# Patient Record
Sex: MALE | Race: WHITE | NOT HISPANIC OR LATINO | Employment: FULL TIME | URBAN - METROPOLITAN AREA
[De-identification: names, ages, dates, MRNs, and addresses within clinical notes are randomized per-mention and may not be internally consistent; named-entity substitution may affect disease eponyms.]

---

## 2017-02-13 ENCOUNTER — TELEPHONE (OUTPATIENT)
Dept: FAMILY MEDICINE | Facility: CLINIC | Age: 47
End: 2017-02-13

## 2017-02-13 DIAGNOSIS — Z79.4 TYPE 2 DIABETES MELLITUS WITHOUT COMPLICATION, WITH LONG-TERM CURRENT USE OF INSULIN: ICD-10-CM

## 2017-02-13 DIAGNOSIS — I10 ESSENTIAL HYPERTENSION: Chronic | ICD-10-CM

## 2017-02-13 DIAGNOSIS — E11.9 TYPE 2 DIABETES MELLITUS WITHOUT COMPLICATION, WITH LONG-TERM CURRENT USE OF INSULIN: ICD-10-CM

## 2017-02-13 RX ORDER — LISINOPRIL 40 MG/1
40 TABLET ORAL 2 TIMES DAILY
Qty: 180 TABLET | Refills: 3 | Status: SHIPPED | OUTPATIENT
Start: 2017-02-13 | End: 2017-02-13 | Stop reason: SDUPTHER

## 2017-02-13 RX ORDER — LISINOPRIL 40 MG/1
40 TABLET ORAL 2 TIMES DAILY
Qty: 60 TABLET | Refills: 11 | Status: SHIPPED | OUTPATIENT
Start: 2017-02-13 | End: 2018-02-11 | Stop reason: SDUPTHER

## 2017-02-13 NOTE — TELEPHONE ENCOUNTER
----- Message from Gina Jordan sent at 2/13/2017 12:42 PM CST -----  Needs refill of lisinopril called to [facility] [place] until mail order comes    [facility]  As well       Reach patient at  [phone]

## 2017-07-16 DIAGNOSIS — E11.9 TYPE 2 DIABETES MELLITUS WITHOUT COMPLICATION: Chronic | ICD-10-CM

## 2017-07-17 RX ORDER — PIOGLITAZONEHYDROCHLORIDE 30 MG/1
TABLET ORAL
Qty: 90 TABLET | Refills: 3 | Status: SHIPPED | OUTPATIENT
Start: 2017-07-17 | End: 2018-07-18

## 2017-08-04 RX ORDER — OMEPRAZOLE 40 MG/1
CAPSULE, DELAYED RELEASE ORAL
Qty: 90 CAPSULE | Refills: 3 | Status: SHIPPED | OUTPATIENT
Start: 2017-08-04 | End: 2018-07-18 | Stop reason: SDUPTHER

## 2017-08-07 DIAGNOSIS — E11.9 TYPE 2 DIABETES MELLITUS WITHOUT COMPLICATION: Chronic | ICD-10-CM

## 2017-08-07 RX ORDER — INSULIN LISPRO 100 [IU]/ML
30 INJECTION, SOLUTION INTRAVENOUS; SUBCUTANEOUS
Qty: 90 ML | Refills: 2 | Status: SHIPPED | OUTPATIENT
Start: 2017-08-07 | End: 2018-01-25 | Stop reason: SDUPTHER

## 2017-08-20 RX ORDER — PRAVASTATIN SODIUM 40 MG/1
TABLET ORAL
Qty: 90 TABLET | Refills: 2 | Status: SHIPPED | OUTPATIENT
Start: 2017-08-20 | End: 2018-05-17 | Stop reason: SDUPTHER

## 2017-09-19 ENCOUNTER — OFFICE VISIT (OUTPATIENT)
Dept: URGENT CARE | Facility: CLINIC | Age: 47
End: 2017-09-19
Payer: COMMERCIAL

## 2017-09-19 VITALS
HEART RATE: 94 BPM | HEIGHT: 76 IN | WEIGHT: 275 LBS | RESPIRATION RATE: 18 BRPM | OXYGEN SATURATION: 98 % | SYSTOLIC BLOOD PRESSURE: 113 MMHG | DIASTOLIC BLOOD PRESSURE: 66 MMHG | TEMPERATURE: 98 F | BODY MASS INDEX: 33.49 KG/M2

## 2017-09-19 DIAGNOSIS — H57.89 IRRITATION OF LEFT EYE: Primary | ICD-10-CM

## 2017-09-19 PROCEDURE — 3074F SYST BP LT 130 MM HG: CPT | Mod: S$GLB,,, | Performed by: PHYSICIAN ASSISTANT

## 2017-09-19 PROCEDURE — 3078F DIAST BP <80 MM HG: CPT | Mod: S$GLB,,, | Performed by: PHYSICIAN ASSISTANT

## 2017-09-19 PROCEDURE — 3008F BODY MASS INDEX DOCD: CPT | Mod: S$GLB,,, | Performed by: PHYSICIAN ASSISTANT

## 2017-09-19 PROCEDURE — 99203 OFFICE O/P NEW LOW 30 MIN: CPT | Mod: S$GLB,,, | Performed by: PHYSICIAN ASSISTANT

## 2017-09-19 RX ORDER — TOBRAMYCIN AND DEXAMETHASONE 3; 1 MG/ML; MG/ML
1 SUSPENSION/ DROPS OPHTHALMIC EVERY 6 HOURS
Qty: 1 BOTTLE | Refills: 0 | Status: SHIPPED | OUTPATIENT
Start: 2017-09-19 | End: 2017-09-24

## 2017-09-19 RX ORDER — FENOFIBRATE 145 MG/1
TABLET, FILM COATED ORAL
Qty: 90 TABLET | Refills: 3 | Status: SHIPPED | OUTPATIENT
Start: 2017-09-19 | End: 2018-07-18 | Stop reason: SDUPTHER

## 2017-09-19 NOTE — PROGRESS NOTES
"Subjective:       Patient ID: Lopez Hicks is a 47 y.o. male.    Vitals:  height is 6' 4" (1.93 m) and weight is 124.7 kg (275 lb). His oral temperature is 98 °F (36.7 °C). His blood pressure is 113/66 and his pulse is 94. His respiration is 18 and oxygen saturation is 98%.     Chief Complaint: Eye Problem    Eye Problem    The left eye is affected. This is a new problem. The current episode started in the past 7 days. The problem occurs constantly. The problem has been waxing and waning. There was no injury mechanism. The pain is at a severity of 7/10. The pain is moderate. There is no known exposure to pink eye. He does not wear contacts. Associated symptoms include itching. Pertinent negatives include no blurred vision, eye redness, fever, nausea, photophobia or vomiting. He has tried eye drops for the symptoms. The treatment provided moderate relief.     Review of Systems   Constitution: Negative for chills and fever.   HENT: Negative for congestion.    Eyes: Positive for itching and pain. Negative for blurred vision, photophobia and redness.   Gastrointestinal: Negative for nausea and vomiting.   Neurological: Negative for headaches.   All other systems reviewed and are negative.      Objective:      Physical Exam   Constitutional: He is oriented to person, place, and time. He appears well-developed and well-nourished.   HENT:   Head: Normocephalic and atraumatic.   Right Ear: External ear normal.   Left Ear: External ear normal.   Nose: Nose normal.   Mouth/Throat: Oropharynx is clear and moist.   Eyes: Conjunctivae and EOM are normal. Pupils are equal, round, and reactive to light. Right eye exhibits no chemosis, no discharge, no exudate and no hordeolum. No foreign body present in the right eye. Left eye exhibits no chemosis, no discharge, no exudate and no hordeolum. Foreign body present in the left eye. Right conjunctiva is not injected. Right conjunctiva has no hemorrhage. Left conjunctiva is not " injected. Left conjunctiva has no hemorrhage. No scleral icterus.   Dust/discharge irrigated from L eye.  No abrasion noted under fluorescein.   No residual FB   Pt tolerated well   Neck: Trachea normal, full passive range of motion without pain and phonation normal. Neck supple.   Musculoskeletal: Normal range of motion.   Neurological: He is alert and oriented to person, place, and time.   Skin: Skin is warm, dry and intact.   Psychiatric: He has a normal mood and affect. His speech is normal and behavior is normal. Judgment and thought content normal. Cognition and memory are normal.   Nursing note and vitals reviewed.      Assessment:       1. Irritation of left eye        Plan:         Irritation of left eye  -     tobramycin-dexamethasone 0.3-0.1% (TOBRADEX) 0.3-0.1 % DrpS; Place 1 drop into the left eye every 6 (six) hours.  Dispense: 1 Bottle; Refill: 0        Particle in the Eye (Conjunctival Foreign Body, Resolved)  The conjunctiva is a thin membrane in the eye. It covers the white of the eye and the inside of the eyelid. A very small object, such as an eyelash or dirt, can become trapped under the eyelid. This is called a conjunctival foreign body. This can be very irritating to the eye, no matter how small the object is. If the exam shows that you no longer have a particle in your eye, any discomfort should go away within the next 24 hours.  Home care  ·   Apply a cool compress (a towel soaked in cool water) to the eye that hurts. Do this 3 to 4 times a day. It will help to reduce redness and swelling.  · Artificial tears can be used to reduce irritation and redness, unless another medicine was prescribed. Artificial tears can be purchased at [employer]  · Acetaminophen or ibuprofen can be used to control pain, unless another pain medicine was prescribed. (Note: If you have chronic liver or kidney disease, or if you have ever had a stomach ulcer or gastrointestinal bleeding, talk with your doctor before  using these medicines.)  Follow-up care  Follow up with your healthcare provider, or as advised.  When to seek medical advice  Contact your healthcare provider right away if any of these occur:  · Increased swelling of the eyelid  · Increased pain or redness in the eye  · Drainage from the eye  · Redness in the skin around the eye  Date Last Reviewed: 6/14/2015  © 4701-0212 [employer]. [address] [place] [address]. All rights reserved. This information is not intended as a substitute for professional medical care. Always follow your healthcare professional's instructions.      Please follow up with your Primary care provider within 2-5 days if your signs and symptoms have not resolved or worsen.     If your condition worsens or fails to improve we recommend that you receive another evaluation at the emergency room immediately or contact your primary medical clinic to discuss your concerns.   You must understand that you have received an Urgent Care treatment only and that you may be released before all of your medical problems are known or treated. You, the patient, will arrange for follow up care as instructed.

## 2017-09-19 NOTE — PATIENT INSTRUCTIONS
Particle in the Eye (Conjunctival Foreign Body, Resolved)  The conjunctiva is a thin membrane in the eye. It covers the white of the eye and the inside of the eyelid. A very small object, such as an eyelash or dirt, can become trapped under the eyelid. This is called a conjunctival foreign body. This can be very irritating to the eye, no matter how small the object is. If the exam shows that you no longer have a particle in your eye, any discomfort should go away within the next 24 hours.  Home care  ·   Apply a cool compress (a towel soaked in cool water) to the eye that hurts. Do this 3 to 4 times a day. It will help to reduce redness and swelling.  · Artificial tears can be used to reduce irritation and redness, unless another medicine was prescribed. Artificial tears can be purchased at [email or web address]  · Acetaminophen or ibuprofen can be used to control pain, unless another pain medicine was prescribed. (Note: If you have chronic liver or kidney disease, or if you have ever had a stomach ulcer or gastrointestinal bleeding, talk with your doctor before using these medicines.)  Follow-up care  Follow up with your healthcare provider, or as advised.  When to seek medical advice  Contact your healthcare provider right away if any of these occur:  · Increased swelling of the eyelid  · Increased pain or redness in the eye  · Drainage from the eye  · Redness in the skin around the eye  Date Last Reviewed: 6/14/2015  © 7357-7331 [employer]. [address] [place] [address], [place] [address]. All rights reserved. This information is not intended as a substitute for professional medical care. Always follow your healthcare professional's instructions.      Please follow up with your Primary care provider within 2-5 days if your signs and symptoms have not resolved or worsen.     If your condition worsens or fails to improve we recommend that you receive another evaluation at the emergency room immediately or contact  your primary medical clinic to discuss your concerns.   You must understand that you have received an Urgent Care treatment only and that you may be released before all of your medical problems are known or treated. You, the patient, will arrange for follow up care as instructed.

## 2017-09-21 ENCOUNTER — TELEPHONE (OUTPATIENT)
Dept: URGENT CARE | Facility: CLINIC | Age: 47
End: 2017-09-21

## 2017-10-18 RX ORDER — BUPROPION HYDROCHLORIDE 200 MG/1
TABLET, EXTENDED RELEASE ORAL
Qty: 180 TABLET | Refills: 3 | Status: SHIPPED | OUTPATIENT
Start: 2017-10-18 | End: 2018-07-18 | Stop reason: SDUPTHER

## 2017-11-12 ENCOUNTER — HOSPITAL ENCOUNTER (OUTPATIENT)
Dept: SLEEP MEDICINE | Facility: HOSPITAL | Age: 47
Discharge: HOME | End: 2017-11-12
Attending: OTOLARYNGOLOGY
Payer: COMMERCIAL

## 2017-11-12 DIAGNOSIS — G47.30 INSOMNIA WITH SLEEP APNEA: ICD-10-CM

## 2017-11-12 DIAGNOSIS — G47.00 INSOMNIA WITH SLEEP APNEA: ICD-10-CM

## 2017-11-12 PROCEDURE — 95806 SLEEP STUDY UNATT&RESP EFFT: CPT

## 2017-12-19 ENCOUNTER — PATIENT OUTREACH (OUTPATIENT)
Dept: ADMINISTRATIVE | Facility: HOSPITAL | Age: 47
End: 2017-12-19

## 2017-12-19 NOTE — PATIENT INSTRUCTIONS
Phone pt to confirm PCP, and schedule appointment for a check up. 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

## 2018-01-25 ENCOUNTER — OFFICE VISIT (OUTPATIENT)
Dept: FAMILY MEDICINE | Facility: CLINIC | Age: 48
End: 2018-01-25
Payer: COMMERCIAL

## 2018-01-25 VITALS
WEIGHT: 288.56 LBS | HEART RATE: 93 BPM | SYSTOLIC BLOOD PRESSURE: 120 MMHG | HEIGHT: 76 IN | BODY MASS INDEX: 35.14 KG/M2 | OXYGEN SATURATION: 97 % | DIASTOLIC BLOOD PRESSURE: 80 MMHG

## 2018-01-25 DIAGNOSIS — Z29.9 PREVENTIVE MEASURE: ICD-10-CM

## 2018-01-25 DIAGNOSIS — E78.00 PURE HYPERCHOLESTEROLEMIA: Chronic | ICD-10-CM

## 2018-01-25 DIAGNOSIS — M62.81 MUSCLE WEAKNESS: ICD-10-CM

## 2018-01-25 DIAGNOSIS — Z00.00 ENCOUNTER FOR PREVENTIVE HEALTH EXAMINATION: ICD-10-CM

## 2018-01-25 DIAGNOSIS — Z79.4 TYPE 2 DIABETES MELLITUS WITHOUT COMPLICATION, WITH LONG-TERM CURRENT USE OF INSULIN: Primary | Chronic | ICD-10-CM

## 2018-01-25 DIAGNOSIS — E11.9 TYPE 2 DIABETES MELLITUS WITHOUT COMPLICATION, WITH LONG-TERM CURRENT USE OF INSULIN: Primary | Chronic | ICD-10-CM

## 2018-01-25 DIAGNOSIS — N52.9 ERECTILE DYSFUNCTION, UNSPECIFIED ERECTILE DYSFUNCTION TYPE: ICD-10-CM

## 2018-01-25 PROCEDURE — 90472 IMMUNIZATION ADMIN EACH ADD: CPT | Mod: S$GLB,,,

## 2018-01-25 PROCEDURE — 99396 PREV VISIT EST AGE 40-64: CPT | Mod: 25,S$GLB,,

## 2018-01-25 PROCEDURE — 90715 TDAP VACCINE 7 YRS/> IM: CPT | Mod: S$GLB,,,

## 2018-01-25 PROCEDURE — 90688 IIV4 VACCINE SPLT 0.5 ML IM: CPT | Mod: S$GLB,,,

## 2018-01-25 PROCEDURE — 90471 IMMUNIZATION ADMIN: CPT | Mod: S$GLB,,,

## 2018-01-25 PROCEDURE — 99999 PR PBB SHADOW E&M-EST. PATIENT-LVL IV: CPT | Mod: PBBFAC,,,

## 2018-01-25 RX ORDER — INSULIN LISPRO 100 [IU]/ML
50 INJECTION, SOLUTION INTRAVENOUS; SUBCUTANEOUS
Qty: 90 ML | Refills: 2 | Status: SHIPPED | OUTPATIENT
Start: 2018-01-25 | End: 2018-07-18 | Stop reason: SDUPTHER

## 2018-01-25 RX ORDER — INSULIN GLARGINE 100 [IU]/ML
INJECTION, SOLUTION SUBCUTANEOUS
Qty: 90 ML | Refills: 3 | Status: SHIPPED | OUTPATIENT
Start: 2018-01-25 | End: 2018-07-18 | Stop reason: SDUPTHER

## 2018-01-25 RX ORDER — SILDENAFIL 100 MG/1
100 TABLET, FILM COATED ORAL DAILY PRN
Qty: 18 TABLET | Refills: 3 | Status: SHIPPED | OUTPATIENT
Start: 2018-01-25 | End: 2018-07-18

## 2018-01-27 ENCOUNTER — TELEPHONE (OUTPATIENT)
Dept: FAMILY MEDICINE | Facility: CLINIC | Age: 48
End: 2018-01-27

## 2018-02-11 DIAGNOSIS — I10 ESSENTIAL HYPERTENSION: Chronic | ICD-10-CM

## 2018-02-11 DIAGNOSIS — E11.9 TYPE 2 DIABETES MELLITUS WITHOUT COMPLICATION, WITH LONG-TERM CURRENT USE OF INSULIN: ICD-10-CM

## 2018-02-11 DIAGNOSIS — Z79.4 TYPE 2 DIABETES MELLITUS WITHOUT COMPLICATION, WITH LONG-TERM CURRENT USE OF INSULIN: ICD-10-CM

## 2018-02-12 RX ORDER — LISINOPRIL 40 MG/1
TABLET ORAL
Qty: 60 TABLET | Refills: 10 | Status: SHIPPED | OUTPATIENT
Start: 2018-02-12 | End: 2018-07-18 | Stop reason: SDUPTHER

## 2018-04-30 PROBLEM — Z00.00 ENCOUNTER FOR PREVENTIVE HEALTH EXAMINATION: Status: RESOLVED | Noted: 2018-01-25 | Resolved: 2018-04-30

## 2018-05-17 RX ORDER — PRAVASTATIN SODIUM 40 MG/1
40 TABLET ORAL DAILY
Qty: 90 TABLET | Refills: 0 | Status: SHIPPED | OUTPATIENT
Start: 2018-05-17 | End: 2018-07-18 | Stop reason: SDUPTHER

## 2018-07-18 ENCOUNTER — OFFICE VISIT (OUTPATIENT)
Dept: INTERNAL MEDICINE | Facility: CLINIC | Age: 48
End: 2018-07-18
Payer: COMMERCIAL

## 2018-07-18 VITALS
RESPIRATION RATE: 16 BRPM | TEMPERATURE: 98 F | SYSTOLIC BLOOD PRESSURE: 134 MMHG | HEART RATE: 98 BPM | WEIGHT: 280 LBS | BODY MASS INDEX: 34.1 KG/M2 | DIASTOLIC BLOOD PRESSURE: 84 MMHG | HEIGHT: 76 IN

## 2018-07-18 DIAGNOSIS — E66.9 OBESITY (BMI 30.0-34.9): ICD-10-CM

## 2018-07-18 DIAGNOSIS — E78.5 HYPERLIPIDEMIA, UNSPECIFIED HYPERLIPIDEMIA TYPE: ICD-10-CM

## 2018-07-18 DIAGNOSIS — Z00.00 ROUTINE GENERAL MEDICAL EXAMINATION AT A HEALTH CARE FACILITY: Primary | ICD-10-CM

## 2018-07-18 DIAGNOSIS — Z12.5 PROSTATE CANCER SCREENING: ICD-10-CM

## 2018-07-18 DIAGNOSIS — E11.9 TYPE 2 DIABETES MELLITUS WITHOUT COMPLICATION, WITH LONG-TERM CURRENT USE OF INSULIN: ICD-10-CM

## 2018-07-18 DIAGNOSIS — Z12.11 COLON CANCER SCREENING: ICD-10-CM

## 2018-07-18 DIAGNOSIS — I10 ESSENTIAL HYPERTENSION: Chronic | ICD-10-CM

## 2018-07-18 DIAGNOSIS — Z79.4 TYPE 2 DIABETES MELLITUS WITHOUT COMPLICATION, WITH LONG-TERM CURRENT USE OF INSULIN: ICD-10-CM

## 2018-07-18 DIAGNOSIS — E78.1 HYPERTRIGLYCERIDEMIA: ICD-10-CM

## 2018-07-18 PROCEDURE — 3075F SYST BP GE 130 - 139MM HG: CPT | Mod: CPTII,S$GLB,, | Performed by: FAMILY MEDICINE

## 2018-07-18 PROCEDURE — 3045F PR MOST RECENT HEMOGLOBIN A1C LEVEL 7.0-9.0%: CPT | Mod: CPTII,S$GLB,, | Performed by: FAMILY MEDICINE

## 2018-07-18 PROCEDURE — 99396 PREV VISIT EST AGE 40-64: CPT | Mod: S$GLB,,, | Performed by: FAMILY MEDICINE

## 2018-07-18 PROCEDURE — 3079F DIAST BP 80-89 MM HG: CPT | Mod: CPTII,S$GLB,, | Performed by: FAMILY MEDICINE

## 2018-07-18 PROCEDURE — 99999 PR PBB SHADOW E&M-EST. PATIENT-LVL III: CPT | Mod: PBBFAC,,, | Performed by: FAMILY MEDICINE

## 2018-07-18 RX ORDER — OMEPRAZOLE 40 MG/1
40 CAPSULE, DELAYED RELEASE ORAL DAILY
Qty: 90 CAPSULE | Refills: 1 | Status: SHIPPED | OUTPATIENT
Start: 2018-07-18 | End: 2019-01-14 | Stop reason: SDUPTHER

## 2018-07-18 RX ORDER — PRAVASTATIN SODIUM 40 MG/1
40 TABLET ORAL DAILY
Qty: 90 TABLET | Refills: 1 | Status: SHIPPED | OUTPATIENT
Start: 2018-07-18 | End: 2019-01-21 | Stop reason: SDUPTHER

## 2018-07-18 RX ORDER — ASPIRIN 81 MG/1
81 TABLET ORAL DAILY
COMMUNITY

## 2018-07-18 RX ORDER — INSULIN LISPRO 100 [IU]/ML
50 INJECTION, SOLUTION INTRAVENOUS; SUBCUTANEOUS
Qty: 90 ML | Refills: 1 | Status: SHIPPED | OUTPATIENT
Start: 2018-07-18 | End: 2018-08-14

## 2018-07-18 RX ORDER — BUPROPION HYDROCHLORIDE 200 MG/1
200 TABLET, EXTENDED RELEASE ORAL 2 TIMES DAILY
Qty: 180 TABLET | Refills: 1 | Status: SHIPPED | OUTPATIENT
Start: 2018-07-18 | End: 2019-05-08 | Stop reason: SDUPTHER

## 2018-07-18 RX ORDER — INSULIN GLARGINE 100 [IU]/ML
INJECTION, SOLUTION SUBCUTANEOUS
Qty: 90 ML | Refills: 1 | Status: SHIPPED | OUTPATIENT
Start: 2018-07-18 | End: 2018-08-14

## 2018-07-18 RX ORDER — FENOFIBRATE 145 MG/1
145 TABLET, FILM COATED ORAL DAILY
Qty: 90 TABLET | Refills: 1 | Status: SHIPPED | OUTPATIENT
Start: 2018-07-18 | End: 2019-01-14 | Stop reason: SDUPTHER

## 2018-07-18 RX ORDER — LISINOPRIL 40 MG/1
40 TABLET ORAL 2 TIMES DAILY
Qty: 180 TABLET | Refills: 1 | Status: SHIPPED | OUTPATIENT
Start: 2018-07-18 | End: 2019-01-14 | Stop reason: SDUPTHER

## 2018-07-19 ENCOUNTER — TELEPHONE (OUTPATIENT)
Dept: INTERNAL MEDICINE | Facility: CLINIC | Age: 48
End: 2018-07-19

## 2018-07-19 PROBLEM — E66.811 OBESITY (BMI 30.0-34.9): Status: ACTIVE | Noted: 2018-07-19

## 2018-07-19 PROBLEM — E66.9 OBESITY (BMI 30.0-34.9): Status: ACTIVE | Noted: 2018-07-19

## 2018-07-19 PROBLEM — E78.5 HYPERLIPIDEMIA: Status: ACTIVE | Noted: 2018-07-19

## 2018-07-19 NOTE — TELEPHONE ENCOUNTER
"----- Message from Jacqui Teddy sent at 7/19/2018  1:20 PM CDT -----  Contact: t[facility] [phone]  RX request - refill or new RX.  Is this a refill or new RX:  Refill   RX name and strength: Pioglitazone  30 mg  Directions:   Is this a 30 day or 90 day RX:  90 ay   Local pharmacy or mail order pharmacy:    Pharmacy name and phone # (DON'T enter "on file" or "in chart"): [facility]      "

## 2018-07-19 NOTE — PROGRESS NOTES
"Subjective:   Patient ID: Lopez Hicks is a 48 y.o. male.    Chief Complaint: Establish Care      HPI  47 yo male with type 2 diabetes with poor control with SEVERAL medications here for annual exam      Patient queried and denies any further complaints.    PREVENTIVE MED  Diet  Exercise  Colorectal Ca  Alcohol use  Tobacco  BP  Depression  Type 2 DM  Hep C  STD  Vision  ALL REVIEWED    ALLERGIES AND MEDICATIONS: updated and reviewed.  Review of patient's allergies indicates:  No Known Allergies    Current Outpatient Prescriptions:     aspirin (ECOTRIN) 81 MG EC tablet, Take 81 mg by mouth once daily., Disp: , Rfl:     b complex vitamins tablet, Take 1 tablet by mouth once daily., Disp: , Rfl:     BD ULTRA-FINE TENA PEN NEEDLES 32 gauge x 5/32" Ndle, , Disp: , Rfl:     buPROPion (WELLBUTRIN SR) 200 MG Tb12, Take 1 tablet (200 mg total) by mouth 2 (two) times daily., Disp: 180 tablet, Rfl: 1    BYDUREON 2 mg/0.65 mL PnIj, INJECT 2 MG INTO THE SKIN ONCE A WEEK, Disp: 12 each, Rfl: 3    fenofibrate (TRICOR) 145 MG tablet, Take 1 tablet (145 mg total) by mouth once daily., Disp: 90 tablet, Rfl: 1    insulin glargine (LANTUS SOLOSTAR U-100 INSULIN) 100 unit/mL (3 mL) InPn pen, INJECT 100 UNITS INTO THE SKIN EVERY EVENING, Disp: 90 mL, Rfl: 1    insulin glargine (LANTUS) 100 unit/mL injection, Inject 100 Units into the skin every evening., Disp: 30 mL, Rfl: 3    insulin lispro (HUMALOG KWIKPEN INSULIN) 100 unit/mL InPn pen, Inject 50 Units into the skin daily with dinner or evening meal., Disp: 90 mL, Rfl: 1    lisinopril (PRINIVIL,ZESTRIL) 40 MG tablet, Take 1 tablet (40 mg total) by mouth 2 (two) times daily., Disp: 180 tablet, Rfl: 1    multivitamin (ONE DAILY MULTIVITAMIN) per tablet, Take 1 tablet by mouth once daily., Disp: , Rfl:     omeprazole (PRILOSEC) 40 MG capsule, Take 1 capsule (40 mg total) by mouth once daily., Disp: 90 capsule, Rfl: 1    ONETOUCH DELICA LANCETS 33 gauge Misc, , Disp: " , Rfl:     ONETOUCH ULTRA TEST Strp, , Disp: , Rfl:     pravastatin (PRAVACHOL) 40 MG tablet, Take 1 tablet (40 mg total) by mouth once daily., Disp: 90 tablet, Rfl: 1    sildenafil (VIAGRA) 100 MG tablet, Take 1 tablet (100 mg total) by mouth daily as needed for Erectile Dysfunction., Disp: 18 tablet, Rfl: 3    SITagliptan-metformin (JANUMET) 50-1,000 mg per tablet, Take 1 tablet by mouth 2 (two) times daily with meals., Disp: 180 tablet, Rfl: 1    Review of Systems   Constitutional: Negative for activity change, appetite change, chills, diaphoresis, fatigue, fever and unexpected weight change.   HENT: Negative for congestion, ear discharge, ear pain, facial swelling, hearing loss, nosebleeds, postnasal drip, rhinorrhea, sinus pressure, sneezing, sore throat, tinnitus, trouble swallowing and voice change.    Eyes: Negative for photophobia, pain, discharge, redness, itching and visual disturbance.   Respiratory: Negative for cough, chest tightness, shortness of breath and wheezing.    Cardiovascular: Negative for chest pain, palpitations and leg swelling.   Gastrointestinal: Negative for abdominal distention, abdominal pain, anal bleeding, blood in stool, constipation, diarrhea, nausea, rectal pain and vomiting.   Endocrine: Negative for cold intolerance, heat intolerance, polydipsia, polyphagia and polyuria.   Genitourinary: Negative for difficulty urinating, dysuria and flank pain.   Musculoskeletal: Negative for arthralgias, back pain, joint swelling, myalgias and neck pain.   Skin: Negative for rash.   Neurological: Negative for dizziness, tremors, seizures, syncope, speech difficulty, weakness, light-headedness, numbness and headaches.   Psychiatric/Behavioral: Negative for behavioral problems, confusion, decreased concentration, dysphoric mood, sleep disturbance and suicidal ideas. The patient is not nervous/anxious and is not hyperactive.        Objective:     Vitals:    07/18/18 1252   BP: 134/84  "  Pulse: 98   Resp: 16   Temp: 98 °F (36.7 °C)   TempSrc: Oral   Weight: 127 kg (280 lb)   Height: 6' 4" (1.93 m)   PainSc: 0-No pain     Body mass index is 34.08 kg/m².    Physical Exam   Constitutional: He is oriented to person, place, and time. He appears well-developed and well-nourished. He is cooperative. He does not have a sickly appearance. No distress.   HENT:   Head: Normocephalic and atraumatic.   Right Ear: Hearing, tympanic membrane, external ear and ear canal normal. No tenderness.   Left Ear: Hearing, tympanic membrane, external ear and ear canal normal. No tenderness.   Nose: Nose normal.   Mouth/Throat: Oropharynx is clear and moist.   Eyes: Conjunctivae and lids are normal. Pupils are equal, round, and reactive to light. Right eye exhibits no discharge. Left eye exhibits no discharge. Right conjunctiva is not injected. Left conjunctiva is not injected. No scleral icterus. Right eye exhibits normal extraocular motion. Left eye exhibits normal extraocular motion.   Neck: Normal range of motion. Neck supple. No JVD present. Carotid bruit is not present. No tracheal deviation and no edema present. No thyromegaly present.   Cardiovascular: Normal rate, regular rhythm, normal heart sounds and normal pulses.  Exam reveals no friction rub.    No murmur heard.  Pulmonary/Chest: Effort normal and breath sounds normal. No accessory muscle usage. No respiratory distress. He has no wheezes. He has no rhonchi. He has no rales.   Abdominal: Soft. Bowel sounds are normal. He exhibits no distension, no abdominal bruit, no pulsatile midline mass and no mass. There is no hepatosplenomegaly. There is no tenderness. There is no rebound, no guarding, no CVA tenderness, no tenderness at McBurney's point and negative Baez's sign.   Musculoskeletal: He exhibits no edema.   Lymphadenopathy:        Head (right side): No submandibular, no preauricular and no posterior auricular adenopathy present.        Head (left side): " No submandibular, no preauricular and no posterior auricular adenopathy present.     He has no cervical adenopathy.   Neurological: He is alert and oriented to person, place, and time. GCS eye subscore is 4. GCS verbal subscore is 5. GCS motor subscore is 6.   Skin: Skin is warm and dry. No ecchymosis and no rash noted. Rash is not maculopapular and not urticarial. He is not diaphoretic. No cyanosis or erythema. Nails show no clubbing.   Psychiatric: He has a normal mood and affect. His speech is normal and behavior is normal. Thought content normal. His mood appears not anxious. His affect is not angry and not inappropriate. He does not exhibit a depressed mood.   Nursing note and vitals reviewed.      Assessment and Plan:   Lopez was seen today for establish care.    Diagnoses and all orders for this visit:    Routine general medical examination at a health care facility  -     CBC auto differential; Future  -     Comprehensive metabolic panel; Future  -     Lipid panel; Future  -     TSH; Future  -     T4, free; Future  -     PSA, Screening; Future    Type 2 diabetes mellitus without complication, with long-term current use of insulin  -     lisinopril (PRINIVIL,ZESTRIL) 40 MG tablet; Take 1 tablet (40 mg total) by mouth 2 (two) times daily.  -     SITagliptan-metformin (JANUMET) 50-1,000 mg per tablet; Take 1 tablet by mouth 2 (two) times daily with meals.  -     insulin lispro (HUMALOG KWIKPEN INSULIN) 100 unit/mL InPn pen; Inject 50 Units into the skin daily with dinner or evening meal.  -     Comprehensive metabolic panel; Future  -     Hemoglobin A1c; Future  -     Lipid panel; Future  -     TSH; Future  -     T4, free; Future  -     Microalbumin/creatinine urine ratio; Future  -     Ambulatory consult to Endocrinology    Essential hypertension  -     lisinopril (PRINIVIL,ZESTRIL) 40 MG tablet; Take 1 tablet (40 mg total) by mouth 2 (two) times daily.  -     Comprehensive metabolic panel;  Future    Hyperlipidemia, unspecified hyperlipidemia type    Hypertriglyceridemia    Prostate cancer screening  -     PSA, Screening; Future    Colon cancer screening  -     Case request GI: COLONOSCOPY    Obesity (BMI 30.0-34.9)    Other orders  -     pravastatin (PRAVACHOL) 40 MG tablet; Take 1 tablet (40 mg total) by mouth once daily.  -     omeprazole (PRILOSEC) 40 MG capsule; Take 1 capsule (40 mg total) by mouth once daily.  -     insulin glargine (LANTUS SOLOSTAR U-100 INSULIN) 100 unit/mL (3 mL) InPn pen; INJECT 100 UNITS INTO THE SKIN EVERY EVENING  -     fenofibrate (TRICOR) 145 MG tablet; Take 1 tablet (145 mg total) by mouth once daily.  -     buPROPion (WELLBUTRIN SR) 200 MG Tb12; Take 1 tablet (200 mg total) by mouth 2 (two) times daily.        Follow-up in about 3 months (around 10/18/2018).    THIS NOTE WILL BE SHARED WITH THE PATIENT.

## 2018-07-25 ENCOUNTER — TELEPHONE (OUTPATIENT)
Dept: ENDOCRINOLOGY | Facility: CLINIC | Age: 48
End: 2018-07-25

## 2018-07-25 NOTE — TELEPHONE ENCOUNTER
Appointment scheduled with Brina Randle for Aug 14th for 9:00am.  Patient will call back if he needs to reschedule but thinks this date will be ok

## 2018-07-25 NOTE — TELEPHONE ENCOUNTER
----- Message from Franklin Sánchez MA sent at 7/25/2018  8:14 AM CDT -----  Contact: [phone]  Yes I will call today to schedule a visit.  Thank you.  ----- Message -----  From: Key Arellano  Sent: 7/25/2018   8:06 AM  To: [facility]ia [facility]ILL[facility]ER[facility] Staff    Good morning, pt is being referred to Endo for DM type 2. Dr Lovett is requesting Dr Randle. I attempted to schedule but did not see any available. Can you please call pt to discuss appt?      Thanks!

## 2018-08-13 ENCOUNTER — TELEPHONE (OUTPATIENT)
Dept: ENDOSCOPY | Facility: HOSPITAL | Age: 48
End: 2018-08-13

## 2018-08-13 NOTE — TELEPHONE ENCOUNTER
Contacted Pt to schedule colonoscopy, no answer, left message for Pt to call back to schedule, number provided [phone].

## 2018-08-14 ENCOUNTER — OFFICE VISIT (OUTPATIENT)
Dept: ENDOCRINOLOGY | Facility: CLINIC | Age: 48
End: 2018-08-14
Payer: COMMERCIAL

## 2018-08-14 VITALS
DIASTOLIC BLOOD PRESSURE: 86 MMHG | WEIGHT: 291.88 LBS | BODY MASS INDEX: 35.54 KG/M2 | HEIGHT: 76 IN | HEART RATE: 86 BPM | SYSTOLIC BLOOD PRESSURE: 138 MMHG

## 2018-08-14 DIAGNOSIS — I10 ESSENTIAL HYPERTENSION: Chronic | ICD-10-CM

## 2018-08-14 DIAGNOSIS — E78.5 HYPERLIPIDEMIA, UNSPECIFIED HYPERLIPIDEMIA TYPE: ICD-10-CM

## 2018-08-14 DIAGNOSIS — Z79.4 TYPE 2 DIABETES MELLITUS WITH DIABETIC POLYNEUROPATHY, WITH LONG-TERM CURRENT USE OF INSULIN: Primary | ICD-10-CM

## 2018-08-14 DIAGNOSIS — E11.42 TYPE 2 DIABETES MELLITUS WITH DIABETIC POLYNEUROPATHY, WITH LONG-TERM CURRENT USE OF INSULIN: Primary | ICD-10-CM

## 2018-08-14 LAB — GLUCOSE SERPL-MCNC: 206 MG/DL (ref 70–110)

## 2018-08-14 PROCEDURE — 99204 OFFICE O/P NEW MOD 45 MIN: CPT | Mod: S$GLB,,, | Performed by: INTERNAL MEDICINE

## 2018-08-14 PROCEDURE — 99999 PR PBB SHADOW E&M-EST. PATIENT-LVL IV: CPT | Mod: PBBFAC,,, | Performed by: INTERNAL MEDICINE

## 2018-08-14 PROCEDURE — 82948 REAGENT STRIP/BLOOD GLUCOSE: CPT | Mod: S$GLB,,, | Performed by: INTERNAL MEDICINE

## 2018-08-14 PROCEDURE — 3008F BODY MASS INDEX DOCD: CPT | Mod: CPTII,S$GLB,, | Performed by: INTERNAL MEDICINE

## 2018-08-14 PROCEDURE — 3045F PR MOST RECENT HEMOGLOBIN A1C LEVEL 7.0-9.0%: CPT | Mod: CPTII,S$GLB,, | Performed by: INTERNAL MEDICINE

## 2018-08-14 PROCEDURE — 3079F DIAST BP 80-89 MM HG: CPT | Mod: CPTII,S$GLB,, | Performed by: INTERNAL MEDICINE

## 2018-08-14 PROCEDURE — 3075F SYST BP GE 130 - 139MM HG: CPT | Mod: CPTII,S$GLB,, | Performed by: INTERNAL MEDICINE

## 2018-08-14 RX ORDER — INSULIN LISPRO 100 [IU]/ML
INJECTION, SOLUTION INTRAVENOUS; SUBCUTANEOUS
Qty: 90 ML | Refills: 3 | Status: SHIPPED | OUTPATIENT
Start: 2018-08-14 | End: 2018-08-14

## 2018-08-14 RX ORDER — INSULIN LISPRO 100 [IU]/ML
INJECTION, SOLUTION INTRAVENOUS; SUBCUTANEOUS
Qty: 90 ML | Refills: 3 | Status: SHIPPED | OUTPATIENT
Start: 2018-08-14 | End: 2019-06-14 | Stop reason: SDUPTHER

## 2018-08-14 RX ORDER — INSULIN GLARGINE 100 [IU]/ML
80 INJECTION, SOLUTION SUBCUTANEOUS NIGHTLY
Qty: 90 ML | Refills: 3 | Status: SHIPPED | OUTPATIENT
Start: 2018-08-14 | End: 2018-08-16 | Stop reason: SDUPTHER

## 2018-08-14 NOTE — PROGRESS NOTES
Subjective:      Patient ID: Lopez Hicks is a 48 y.o. male.    Chief Complaint:  Diabetes      History of Present Illness  With regards to the diabetes:    Diagnosed: in his early 20s  Found on part of physical labs     Known complications: pn       Current regimen:   bydureon - does forget and does not feel its very effective   humalog 40 with dinner - does not take the pens with him so he does not use it for breakfast or lunch   janumet  Bid   lantus 100 qhs      Failed treatments: none        # times a day testing none      Hypoglycemic event? Rare    could happen if he is very active   If it does, it will occur Overnight     Last eye exam: >  year. no DR       occasional numbness or tingling of feet    Typical meals: not following a  diet    Education - last visit: > 1 year ago   Exercise - very  active     + Polyuria polydipsia nocturia     With regards to hypertension:  Tolerating ACEI      With regards to dyslipidemia:  Tolerating statin     Has nancy and is using  CPAP     Review of Systems   Constitutional: Negative for unexpected weight change.   Eyes: Negative for visual disturbance.   Respiratory: Negative for shortness of breath.    Cardiovascular: Negative for chest pain.   Gastrointestinal: Negative for abdominal pain.   Musculoskeletal: Negative for myalgias.   Skin: Negative for wound.   Neurological: Negative for headaches.   Hematological: Does not bruise/bleed easily.   Psychiatric/Behavioral: Negative for sleep disturbance.       Objective:   Physical Exam   Constitutional: He appears well-developed.   HENT:   Right Ear: External ear normal.   Left Ear: External ear normal.   Nose: Nose normal.   Hearing normal    Neck: No tracheal deviation present. No thyromegaly present.   Cardiovascular: Normal rate.   No murmur heard.  Pulmonary/Chest: Effort normal and breath sounds normal.   Abdominal: Soft. He exhibits no mass.   No hernia noted   Musculoskeletal: He exhibits no edema.    Neurological: He is alert. No cranial nerve deficit or sensory deficit. Coordination and gait normal.        Skin: No rash noted.   No nodules   Psychiatric: He has a normal mood and affect. Judgment normal.   Vitals reviewed.  Feet no cuts or  scratches  Shoes appropriate  sensation decreased to vibration    Body mass index is 35.53 kg/m².    Lab Review:   Lab Results   Component Value Date    HGBA1C 8.0 (H) 07/27/2018     Lab Results   Component Value Date    CHOL 159 07/27/2018    HDL 28 (L) 07/27/2018    LDLCALC Invalid, Trig>400.0 07/27/2018    TRIG 656 (H) 07/27/2018    CHOLHDL 17.6 (L) 07/27/2018     Lab Results   Component Value Date     07/27/2018    K 4.1 07/27/2018     07/27/2018    CO2 24 07/27/2018     (H) 07/27/2018    BUN 21 (H) 07/27/2018    CREATININE 1.18 07/27/2018    CALCIUM 10.3 07/27/2018    PROT 7.6 07/27/2018    ALBUMIN 4.5 07/27/2018    BILITOT 0.2 07/27/2018    ALKPHOS 69 07/27/2018    AST 34 07/27/2018    ALT 42 07/27/2018    ANIONGAP 13 07/27/2018    ESTGFRAFRICA >60.0 07/27/2018    EGFRNONAA >60.0 07/27/2018    TSH 2.390 07/27/2018       Assessment and Plan     Type 2 diabetes mellitus with diabetic polyneuropathy  Reviewed goals of therapy are to get the best control we can without hypoglycemia    Refer to education    Medication changes:   Stop: bydureon   Change basal to 80  Start prandial 20-20-30-0     Reviewed patient's current insulin regimen. Clarified proper insulin dose and timing in relation to meals, etc. Insulin injection sites and proper rotation instructed.       -Advised frequent self blood glucose monitoring.  Patient encouraged to document glucose results and bring them to every clinic visit      -Hypoglycemia precautions discussed. Instructed on precautions before driving.      -Close adherence to lifestyle changes recommended.      -Periodic follow ups for eye evaluations, foot care and dental care suggested.    rtc with me or FOUZIA in 2 months              Essential hypertension  Tolerating ACEI     Blood pressure goals discussed with patient        Hyperlipidemia  On statin per ADA recommendations    Trig will improve with dm control       Plan cgms if covered

## 2018-08-14 NOTE — LETTER
August 14, 2018      Meng Lovett MD  [address] [facility]  [address]  [place] [address]           [facility] - Endocrinology  [address]  [facility] [address]  Phone: [phone]          Patient: Lopez Hicks   MR Number: [id number]   YOB: 1970   Date of Visit: 8/14/2018       Dear Dr. Meng Lovett:    Thank you for referring Lopez Hicks to me for evaluation. Attached you will find relevant portions of my assessment and plan of care.    If you have questions, please do not hesitate to call me. I look forward to following Lopez Hicks along with you.    Sincerely,    Brina Randle MD    Enclosure  CC:  No Recipients    If you would like to receive this communication electronically, please contact [email or web address] or [phone] to request more information on [employer] Link access.    For providers and/or their staff who would like to refer a patient to [facility], please contact us through our one-stop-shop provider referral line, [facility], at [phone].    If you feel you have received this communication in error or would no longer like to receive these types of communications, please e-mail [email or web address]

## 2018-08-14 NOTE — ASSESSMENT & PLAN NOTE
Reviewed goals of therapy are to get the best control we can without hypoglycemia    Refer to education    Medication changes:   Stop: bydureon   Change basal to 80  Start prandial 20-20-30-0     Reviewed patient's current insulin regimen. Clarified proper insulin dose and timing in relation to meals, etc. Insulin injection sites and proper rotation instructed.       -Advised frequent self blood glucose monitoring.  Patient encouraged to document glucose results and bring them to every clinic visit      -Hypoglycemia precautions discussed. Instructed on precautions before driving.      -Close adherence to lifestyle changes recommended.      -Periodic follow ups for eye evaluations, foot care and dental care suggested.    rtc with me or FOUZIA in 2 months

## 2018-08-16 ENCOUNTER — PATIENT MESSAGE (OUTPATIENT)
Dept: DIABETES | Facility: CLINIC | Age: 48
End: 2018-08-16

## 2018-08-16 RX ORDER — INSULIN GLARGINE 100 [IU]/ML
80 INJECTION, SOLUTION SUBCUTANEOUS NIGHTLY
Qty: 72 ML | Refills: 3 | Status: SHIPPED | OUTPATIENT
Start: 2018-08-16 | End: 2018-08-23 | Stop reason: SDUPTHER

## 2018-08-23 RX ORDER — INSULIN GLARGINE 100 [IU]/ML
80 INJECTION, SOLUTION SUBCUTANEOUS NIGHTLY
Qty: 72 ML | Refills: 3 | Status: SHIPPED | OUTPATIENT
Start: 2018-08-23 | End: 2019-02-22 | Stop reason: ALTCHOICE

## 2019-01-01 DIAGNOSIS — E11.9 TYPE 2 DIABETES MELLITUS WITHOUT COMPLICATION, WITH LONG-TERM CURRENT USE OF INSULIN: ICD-10-CM

## 2019-01-01 DIAGNOSIS — Z79.4 TYPE 2 DIABETES MELLITUS WITHOUT COMPLICATION, WITH LONG-TERM CURRENT USE OF INSULIN: ICD-10-CM

## 2019-01-02 RX ORDER — SITAGLIPTIN AND METFORMIN HYDROCHLORIDE 1000; 50 MG/1; MG/1
TABLET, FILM COATED ORAL
Qty: 180 TABLET | Refills: 0 | Status: SHIPPED | OUTPATIENT
Start: 2019-01-02 | End: 2019-07-16 | Stop reason: ALTCHOICE

## 2019-01-02 NOTE — TELEPHONE ENCOUNTER
----- Message from Meng Loevtt MD sent at 1/2/2019  1:28 PM CST -----  Happy to help refill meds and I did refill his diabetes med today, but he should get future diab med refills from endocrine in order to avoid confusion. For ex., sometimes pharmacy requests refill on a med pt is out of. Thank you

## 2019-01-14 DIAGNOSIS — E11.9 TYPE 2 DIABETES MELLITUS WITHOUT COMPLICATION, WITH LONG-TERM CURRENT USE OF INSULIN: ICD-10-CM

## 2019-01-14 DIAGNOSIS — I10 ESSENTIAL HYPERTENSION: Chronic | ICD-10-CM

## 2019-01-14 DIAGNOSIS — Z79.4 TYPE 2 DIABETES MELLITUS WITHOUT COMPLICATION, WITH LONG-TERM CURRENT USE OF INSULIN: ICD-10-CM

## 2019-01-14 RX ORDER — FENOFIBRATE 145 MG/1
145 TABLET, FILM COATED ORAL DAILY
Qty: 30 TABLET | Refills: 0 | Status: SHIPPED | OUTPATIENT
Start: 2019-01-14 | End: 2019-02-22 | Stop reason: SDUPTHER

## 2019-01-14 RX ORDER — LISINOPRIL 40 MG/1
40 TABLET ORAL 2 TIMES DAILY
Qty: 30 TABLET | Refills: 0 | Status: SHIPPED | OUTPATIENT
Start: 2019-01-14 | End: 2019-01-23

## 2019-01-14 RX ORDER — OMEPRAZOLE 40 MG/1
CAPSULE, DELAYED RELEASE ORAL
Qty: 90 CAPSULE | Refills: 1 | Status: SHIPPED | OUTPATIENT
Start: 2019-01-14 | End: 2019-05-08 | Stop reason: SDUPTHER

## 2019-01-14 NOTE — TELEPHONE ENCOUNTER
----- Message from Meng Lovett MD sent at 1/14/2019 12:28 PM CST -----  He is overdue six mo f/u w me. He requests lisinopril. I need to stop that med for him now and send in a new med bc of a new study. I will send when I hear back from him so that he will understand. Please ask him to f/u soon, too. Thank you

## 2019-01-14 NOTE — TELEPHONE ENCOUNTER
"----- Message from Rocio Lynn sent at 1/14/2019 10:09 AM CST -----  Contact: [phone]  RX request - refill or new RX.  Is this a refill or new RX:New   RX name and strength: lisinopril (PRINIVIL,ZESTRIL) 40 MG tablet  Directions:   Is this a 30 day or 90 day RX:    Local pharmacy or mail order pharmacy:  Local  Pharmacy name and phone # (DON'T enter "on file" or "in chart"): [id number] - [place], [place] - [address] [phone] (Phone)  [phone] (Fax)  Comments:  Pt states he is no longer using [facility] and would like all medication sent to pharmacy listed above.    RX request - refill or new RX.  Is this a refill or new RX:  New  RX name and strength: fenofibrate (TRICOR) 145 MG tablet   Directions:   Is this a 30 day or 90 day RX:    Local pharmacy or mail order pharmacy:  Local  Pharmacy name and phone # (DON'T enter "on file" or "in chart"): [id number] - [place], [place] - [address] [phone] (Phone)  [phone] (Fax)  Comments:      RX request - refill or new RX.  Is this a refill or new RX:  New  RX name and strength: omeprazole (PRILOSEC) 40 MG capsule  Directions:   Is this a 30 day or 90 day RX:    Local pharmacy or mail order pharmacy:  Local  Pharmacy name and phone # (DON'T enter "on file" or "in chart"): [id number] - [place], [place] - [address] [phone] (Phone)  [phone] (Fax)  Comments:                  "

## 2019-01-21 ENCOUNTER — TELEPHONE (OUTPATIENT)
Dept: INTERNAL MEDICINE | Facility: CLINIC | Age: 49
End: 2019-01-21

## 2019-01-21 RX ORDER — PRAVASTATIN SODIUM 40 MG/1
TABLET ORAL
Qty: 90 TABLET | Refills: 0 | Status: SHIPPED | OUTPATIENT
Start: 2019-01-21 | End: 2019-06-06 | Stop reason: SDUPTHER

## 2019-01-21 NOTE — TELEPHONE ENCOUNTER
----- Message from Meng Lovett MD sent at 1/21/2019 12:38 PM CST -----  Long overdue appt. I refilled pravastatin. No further refills until f/u. Thx

## 2019-01-23 ENCOUNTER — LAB VISIT (OUTPATIENT)
Dept: LAB | Facility: HOSPITAL | Age: 49
End: 2019-01-23
Attending: INTERNAL MEDICINE
Payer: COMMERCIAL

## 2019-01-23 ENCOUNTER — TELEPHONE (OUTPATIENT)
Dept: ENDOCRINOLOGY | Facility: CLINIC | Age: 49
End: 2019-01-23

## 2019-01-23 ENCOUNTER — OFFICE VISIT (OUTPATIENT)
Dept: INTERNAL MEDICINE | Facility: CLINIC | Age: 49
End: 2019-01-23
Payer: COMMERCIAL

## 2019-01-23 VITALS
DIASTOLIC BLOOD PRESSURE: 84 MMHG | RESPIRATION RATE: 18 BRPM | BODY MASS INDEX: 34.5 KG/M2 | HEART RATE: 82 BPM | SYSTOLIC BLOOD PRESSURE: 154 MMHG | HEIGHT: 76 IN | TEMPERATURE: 98 F | WEIGHT: 283.31 LBS

## 2019-01-23 DIAGNOSIS — E78.5 HYPERLIPIDEMIA ASSOCIATED WITH TYPE 2 DIABETES MELLITUS: ICD-10-CM

## 2019-01-23 DIAGNOSIS — E11.42 TYPE 2 DIABETES MELLITUS WITH DIABETIC POLYNEUROPATHY, WITH LONG-TERM CURRENT USE OF INSULIN: ICD-10-CM

## 2019-01-23 DIAGNOSIS — Z79.4 TYPE 2 DIABETES MELLITUS WITH DIABETIC POLYNEUROPATHY, WITH LONG-TERM CURRENT USE OF INSULIN: ICD-10-CM

## 2019-01-23 DIAGNOSIS — E78.5 HYPERLIPIDEMIA, UNSPECIFIED HYPERLIPIDEMIA TYPE: ICD-10-CM

## 2019-01-23 DIAGNOSIS — Z79.4 TYPE 2 DIABETES MELLITUS WITH DIABETIC POLYNEUROPATHY, WITH LONG-TERM CURRENT USE OF INSULIN: Primary | ICD-10-CM

## 2019-01-23 DIAGNOSIS — I15.2 HYPERTENSION ASSOCIATED WITH DIABETES: ICD-10-CM

## 2019-01-23 DIAGNOSIS — E11.42 TYPE 2 DIABETES MELLITUS WITH DIABETIC POLYNEUROPATHY, WITH LONG-TERM CURRENT USE OF INSULIN: Primary | ICD-10-CM

## 2019-01-23 DIAGNOSIS — I10 ESSENTIAL HYPERTENSION: Chronic | ICD-10-CM

## 2019-01-23 DIAGNOSIS — E11.59 HYPERTENSION ASSOCIATED WITH DIABETES: ICD-10-CM

## 2019-01-23 DIAGNOSIS — E11.69 HYPERLIPIDEMIA ASSOCIATED WITH TYPE 2 DIABETES MELLITUS: ICD-10-CM

## 2019-01-23 PROBLEM — E66.9 OBESITY (BMI 30.0-34.9): Status: RESOLVED | Noted: 2018-07-19 | Resolved: 2019-01-23

## 2019-01-23 PROBLEM — E66.811 OBESITY (BMI 30.0-34.9): Status: RESOLVED | Noted: 2018-07-19 | Resolved: 2019-01-23

## 2019-01-23 LAB
25(OH)D3+25(OH)D2 SERPL-MCNC: 19 NG/ML
ALBUMIN SERPL BCP-MCNC: 4.1 G/DL
ALP SERPL-CCNC: 56 U/L
ALT SERPL W/O P-5'-P-CCNC: 32 U/L
ANION GAP SERPL CALC-SCNC: 11 MMOL/L
AST SERPL-CCNC: 23 U/L
BILIRUB SERPL-MCNC: 0.4 MG/DL
BUN SERPL-MCNC: 23 MG/DL
CALCIUM SERPL-MCNC: 10.4 MG/DL
CHLORIDE SERPL-SCNC: 98 MMOL/L
CO2 SERPL-SCNC: 25 MMOL/L
CREAT SERPL-MCNC: 1.4 MG/DL
EST. GFR  (AFRICAN AMERICAN): >60 ML/MIN/1.73 M^2
EST. GFR  (NON AFRICAN AMERICAN): 58.2 ML/MIN/1.73 M^2
ESTIMATED AVG GLUCOSE: 243 MG/DL
GLUCOSE SERPL-MCNC: 239 MG/DL
HBA1C MFR BLD HPLC: 10.1 %
POTASSIUM SERPL-SCNC: 4.7 MMOL/L
PROT SERPL-MCNC: 7.7 G/DL
SODIUM SERPL-SCNC: 134 MMOL/L
TSH SERPL DL<=0.005 MIU/L-ACNC: 1.25 UIU/ML
VIT B12 SERPL-MCNC: >2000 PG/ML

## 2019-01-23 PROCEDURE — 84443 ASSAY THYROID STIM HORMONE: CPT

## 2019-01-23 PROCEDURE — 99999 PR PBB SHADOW E&M-EST. PATIENT-LVL III: CPT | Mod: PBBFAC,,, | Performed by: INTERNAL MEDICINE

## 2019-01-23 PROCEDURE — 83036 HEMOGLOBIN GLYCOSYLATED A1C: CPT

## 2019-01-23 PROCEDURE — 99214 OFFICE O/P EST MOD 30 MIN: CPT | Mod: S$GLB,,, | Performed by: INTERNAL MEDICINE

## 2019-01-23 PROCEDURE — 36415 COLL VENOUS BLD VENIPUNCTURE: CPT | Mod: PO

## 2019-01-23 PROCEDURE — 82607 VITAMIN B-12: CPT

## 2019-01-23 PROCEDURE — 80053 COMPREHEN METABOLIC PANEL: CPT

## 2019-01-23 PROCEDURE — 99214 PR OFFICE/OUTPT VISIT, EST, LEVL IV, 30-39 MIN: ICD-10-PCS | Mod: S$GLB,,, | Performed by: INTERNAL MEDICINE

## 2019-01-23 PROCEDURE — 82306 VITAMIN D 25 HYDROXY: CPT

## 2019-01-23 PROCEDURE — 99999 PR PBB SHADOW E&M-EST. PATIENT-LVL III: ICD-10-PCS | Mod: PBBFAC,,, | Performed by: INTERNAL MEDICINE

## 2019-01-23 RX ORDER — SILDENAFIL 100 MG/1
TABLET, FILM COATED ORAL
COMMUNITY
Start: 2018-10-22 | End: 2023-12-01 | Stop reason: SDUPTHER

## 2019-01-23 RX ORDER — LOSARTAN POTASSIUM 100 MG/1
100 TABLET ORAL DAILY
Qty: 30 TABLET | Refills: 0 | Status: SHIPPED | OUTPATIENT
Start: 2019-01-23 | End: 2019-02-20 | Stop reason: SDUPTHER

## 2019-01-23 NOTE — PROGRESS NOTES
Subjective:       Patient ID: Lopez Hicks is a 48 y.o. male.    Chief Complaint: Follow-up and Medication Problem (wants to change BP meds)    HPI   Pt with T2DM, HTN, HLD, BEVERLY, Insomnia, Obesity is here for f/u. Pt ran out of his BP medication and is requesting med refills.   Review of Systems   Constitutional: Negative for activity change, appetite change, chills, diaphoresis, fatigue, fever and unexpected weight change.   HENT: Negative for postnasal drip, rhinorrhea, sinus pressure, sneezing, sore throat, trouble swallowing and voice change.    Respiratory: Negative for cough, shortness of breath and wheezing.    Cardiovascular: Negative for chest pain, palpitations and leg swelling.   Gastrointestinal: Negative for abdominal pain, blood in stool, constipation, diarrhea, nausea and vomiting.   Genitourinary: Negative for dysuria.   Musculoskeletal: Negative for arthralgias and myalgias.   Skin: Negative for rash and wound.   Allergic/Immunologic: Negative for environmental allergies and food allergies.   Hematological: Negative for adenopathy. Does not bruise/bleed easily.       Objective:      Physical Exam   Constitutional: He is oriented to person, place, and time. He appears well-developed and well-nourished. No distress.   HENT:   Head: Normocephalic and atraumatic.   Eyes: Conjunctivae and EOM are normal. Pupils are equal, round, and reactive to light. Right eye exhibits no discharge. Left eye exhibits no discharge. No scleral icterus.   Neck: Normal range of motion. Neck supple. No JVD present.   Cardiovascular: Normal rate, regular rhythm and normal heart sounds.   No murmur heard.  Pulmonary/Chest: Effort normal and breath sounds normal. No respiratory distress. He has no wheezes. He has no rales.   Musculoskeletal: He exhibits no edema.   Lymphadenopathy:     He has no cervical adenopathy.   Neurological: He is alert and oriented to person, place, and time.   Skin: Skin is warm and dry. No  rash noted. He is not diaphoretic. No pallor.       Assessment:       1. Type 2 diabetes mellitus with diabetic polyneuropathy, with long-term current use of insulin    2. Hypertension associated with diabetes    3. Hyperlipidemia associated with type 2 diabetes mellitus        Plan:    1. T2DM- not well controled, last A1C of 8       Referral to Endocrine    2. HTN- Rx Losartan 100 mg daily   3. HLD- Not well controlled on Fenofibrate/Pravastatin   4. F/u in 2 wks for HTN

## 2019-01-23 NOTE — TELEPHONE ENCOUNTER
Left vm message apologizing to patient that Dr Randle has no openings at all right now through May.  Her June schedules will open around Feb 1st and suggested he call back then to schedule.  He also has the option of seeing one of her Nurse Practitioners who may have an opening.  Provided call back number

## 2019-01-23 NOTE — TELEPHONE ENCOUNTER
----- Message from Esther Moctezuma sent at 1/23/2019  8:40 AM CST -----  Mr. Hicks is a pt of Dr. Randle and would like an appt to see Dr. Randle.  I tried scheduling and there was nothing available.  Please call pt @ [phone].

## 2019-01-29 ENCOUNTER — TELEPHONE (OUTPATIENT)
Dept: INTERNAL MEDICINE | Facility: CLINIC | Age: 49
End: 2019-01-29

## 2019-01-29 RX ORDER — HYDROCHLOROTHIAZIDE 25 MG/1
25 TABLET ORAL DAILY
Qty: 90 TABLET | Refills: 3 | Status: SHIPPED | OUTPATIENT
Start: 2019-01-29 | End: 2019-12-21

## 2019-01-29 NOTE — TELEPHONE ENCOUNTER
----- Message from Jacqui Espinal sent at 1/29/2019  9:27 AM CST -----  Contact: call [phone]  Calling to let you know that his blood pressure is still running high with the medication, yesterday it was 161-95 and this morning it was 178-98

## 2019-02-20 RX ORDER — LOSARTAN POTASSIUM 100 MG/1
TABLET ORAL
Qty: 30 TABLET | Refills: 0 | Status: SHIPPED | OUTPATIENT
Start: 2019-02-20 | End: 2019-03-26 | Stop reason: SDUPTHER

## 2019-02-20 RX ORDER — FENOFIBRATE 145 MG/1
TABLET, FILM COATED ORAL
Qty: 30 TABLET | Refills: 0 | OUTPATIENT
Start: 2019-02-20

## 2019-02-21 RX ORDER — FENOFIBRATE 145 MG/1
TABLET, FILM COATED ORAL
Qty: 30 TABLET | Refills: 0 | OUTPATIENT
Start: 2019-02-21

## 2019-02-22 ENCOUNTER — OFFICE VISIT (OUTPATIENT)
Dept: INTERNAL MEDICINE | Facility: CLINIC | Age: 49
End: 2019-02-22
Payer: COMMERCIAL

## 2019-02-22 ENCOUNTER — OFFICE VISIT (OUTPATIENT)
Dept: ENDOCRINOLOGY | Facility: CLINIC | Age: 49
End: 2019-02-22
Payer: COMMERCIAL

## 2019-02-22 VITALS
BODY MASS INDEX: 34.45 KG/M2 | WEIGHT: 282.88 LBS | DIASTOLIC BLOOD PRESSURE: 80 MMHG | HEIGHT: 76 IN | HEART RATE: 83 BPM | TEMPERATURE: 98 F | SYSTOLIC BLOOD PRESSURE: 140 MMHG | RESPIRATION RATE: 18 BRPM

## 2019-02-22 VITALS
HEART RATE: 76 BPM | WEIGHT: 285.94 LBS | BODY MASS INDEX: 34.82 KG/M2 | HEIGHT: 76 IN | DIASTOLIC BLOOD PRESSURE: 81 MMHG | SYSTOLIC BLOOD PRESSURE: 134 MMHG

## 2019-02-22 DIAGNOSIS — E55.9 HYPOVITAMINOSIS D: ICD-10-CM

## 2019-02-22 DIAGNOSIS — I10 HTN, GOAL BELOW 130/80: Primary | ICD-10-CM

## 2019-02-22 DIAGNOSIS — I10 ESSENTIAL HYPERTENSION: ICD-10-CM

## 2019-02-22 DIAGNOSIS — E78.5 HYPERLIPIDEMIA, UNSPECIFIED HYPERLIPIDEMIA TYPE: ICD-10-CM

## 2019-02-22 DIAGNOSIS — E78.1 HYPERTRIGLYCERIDEMIA: ICD-10-CM

## 2019-02-22 DIAGNOSIS — Z71.9 VISIT FOR COUNSELING: ICD-10-CM

## 2019-02-22 PROCEDURE — 99999 PR PBB SHADOW E&M-EST. PATIENT-LVL V: ICD-10-PCS | Mod: PBBFAC,,, | Performed by: NURSE PRACTITIONER

## 2019-02-22 PROCEDURE — 90686 IIV4 VACC NO PRSV 0.5 ML IM: CPT | Mod: S$GLB,,, | Performed by: NURSE PRACTITIONER

## 2019-02-22 PROCEDURE — 90471 IMMUNIZATION ADMIN: CPT | Mod: S$GLB,,, | Performed by: NURSE PRACTITIONER

## 2019-02-22 PROCEDURE — 99213 PR OFFICE/OUTPT VISIT, EST, LEVL III, 20-29 MIN: ICD-10-PCS | Mod: S$GLB,,, | Performed by: INTERNAL MEDICINE

## 2019-02-22 PROCEDURE — 99999 PR PBB SHADOW E&M-EST. PATIENT-LVL III: ICD-10-PCS | Mod: PBBFAC,,, | Performed by: INTERNAL MEDICINE

## 2019-02-22 PROCEDURE — 90686 FLU VACCINE (QUAD) GREATER THAN OR EQUAL TO 3YO PRESERVATIVE FREE IM: ICD-10-PCS | Mod: S$GLB,,, | Performed by: NURSE PRACTITIONER

## 2019-02-22 PROCEDURE — 99215 PR OFFICE/OUTPT VISIT, EST, LEVL V, 40-54 MIN: ICD-10-PCS | Mod: 25,S$GLB,, | Performed by: NURSE PRACTITIONER

## 2019-02-22 PROCEDURE — 99213 OFFICE O/P EST LOW 20 MIN: CPT | Mod: S$GLB,,, | Performed by: INTERNAL MEDICINE

## 2019-02-22 PROCEDURE — 99215 OFFICE O/P EST HI 40 MIN: CPT | Mod: 25,S$GLB,, | Performed by: NURSE PRACTITIONER

## 2019-02-22 PROCEDURE — 90471 FLU VACCINE (QUAD) GREATER THAN OR EQUAL TO 3YO PRESERVATIVE FREE IM: ICD-10-PCS | Mod: S$GLB,,, | Performed by: NURSE PRACTITIONER

## 2019-02-22 PROCEDURE — 99999 PR PBB SHADOW E&M-EST. PATIENT-LVL V: CPT | Mod: PBBFAC,,, | Performed by: NURSE PRACTITIONER

## 2019-02-22 PROCEDURE — 99999 PR PBB SHADOW E&M-EST. PATIENT-LVL III: CPT | Mod: PBBFAC,,, | Performed by: INTERNAL MEDICINE

## 2019-02-22 RX ORDER — AMLODIPINE BESYLATE 5 MG/1
5 TABLET ORAL DAILY
Qty: 30 TABLET | Refills: 0 | Status: SHIPPED | OUTPATIENT
Start: 2019-02-22 | End: 2019-03-08

## 2019-02-22 RX ORDER — ERGOCALCIFEROL 1.25 MG/1
50000 CAPSULE ORAL
Qty: 12 CAPSULE | Refills: 0 | Status: SHIPPED | OUTPATIENT
Start: 2019-02-22 | End: 2019-02-22 | Stop reason: SDUPTHER

## 2019-02-22 RX ORDER — ERGOCALCIFEROL 1.25 MG/1
50000 CAPSULE ORAL
Qty: 12 CAPSULE | Refills: 0 | Status: SHIPPED | OUTPATIENT
Start: 2019-02-22 | End: 2019-02-22 | Stop reason: ALTCHOICE

## 2019-02-22 RX ORDER — INSULIN DEGLUDEC 200 U/ML
100 INJECTION, SOLUTION SUBCUTANEOUS DAILY
Qty: 15 SYRINGE | Refills: 1 | Status: SHIPPED | OUTPATIENT
Start: 2019-02-22 | End: 2019-02-22

## 2019-02-22 RX ORDER — FENOFIBRATE 145 MG/1
145 TABLET, FILM COATED ORAL DAILY
Qty: 90 TABLET | Refills: 3 | Status: SHIPPED | OUTPATIENT
Start: 2019-02-22 | End: 2020-02-21

## 2019-02-22 NOTE — PROGRESS NOTES
Administered Influenza Vaccine to patient. Patient tolerated it well, and was advised to wait 15 minutes before leaving clinic to assure no adverse effects. Patient verbalized understanding.

## 2019-02-22 NOTE — PROGRESS NOTES
CC: This 48 y.o. White male  is here for evaluation of  T2DM along with comorbidities indicated in the Visit Diagnosis section of this encounter.    HPI: Lopez Hicks was diagnosed with T2DM in early 20s, diagnosed upon routine labs.     Last seen by Dr. Randle in August 2018. At that lov, Yariel was dc'd and he was advised to take Humalog ac (not just before dinner). She had also advised him to decrease Lantus from 100 to 80 units but resumed 100 units d/t hyperglycemia.     Latest A1c up from 8 to 10.1% in January.   He has since improved diet and medication adherence - better now about his insulin doses at breakfast/lunch. Also restarted testing his BGs.     LAST DIABETES EDUCATION: upon diagnosis     HOSPITALIZED FOR DIABETES -  No.    PRESCRIBED DIABETES MEDICATIONS: Janumet ( mg) 1 tab bid, Humalog Kwikpen 30 or 40 units before meals, Lantus Solostar 100 units hs     Misses medication doses - Yes -     Misses Humalog before lunch at least half the time. Has been getting better with breakfast but denies missed dinner doses.   He does carry Humalog to work with him. He will inject Humalog at home after he eats out.     DM COMPLICATIONS: peripheral neuropathy    SIGNIFICANT DIABETES MED HISTORY: denies intolerance to prior DM meds. Cites that Bydureon was ineffective; was dc'd in 8/2018    SELF MONITORING BLOOD GLUCOSE: Checks blood glucose at home 1-2x/day. Just resumed a few weeks ago                   HYPOGLYCEMIC EPISODES: rare      CURRENT DIET: drinks diet coke and unsweet tea. Eats 2-3 meals/day. Skips breakfast if he's off of work.   Diet recall: supper was pizza with cauliflower crust. Lunch was red beans and brown rice, beef roast. Chips midafternoon. Breakfast was granola bar. Midmorning ate chicken noodle soup.     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 No acute distress.   PSYCH: AAOx3, appropriate mood and affect, conversant, well-groomed. Judgement and insight good.   NEURO: Cranial nerves grossly intact. Speech clear, no tremor.   CHEST: Respirations even and unlabored.   MS: Gait steady. No clubbing.   FEET: Footware appropriate.         Protective Sensation (w/ 10 gram monofilament):  Right: Decreased - markedly   Left: Absent    Visual Inspection:  Skin Breakdown -  Neither    Pedal Pulses:   Right: Present  Left: Present    Posterior tibialis:   Right:Present  Left: Present        Hemoglobin A1C   Date Value Ref Range Status   01/23/2019 10.1 (H) 4.0 - 5.6 % Final     Comment:     ADA Screening Guidelines:  5.7-6.4%  Consistent with prediabetes  >or=6.5%  Consistent with diabetes  High levels of fetal hemoglobin interfere with the HbA1C  assay. Heterozygous hemoglobin variants (HbS, HgC, etc)do  not significantly interfere with this assay.   However, presence of multiple variants may affect accuracy.     07/27/2018 8.0 (H) 4.0 - 5.6 % Final     Comment:     ADA Screening Guidelines:  5.7-6.4%  Consistent with prediabetes  >or=6.5%  Consistent with diabetes  High levels of fetal hemoglobin interfere with the HbA1C  assay. Heterozygous hemoglobin variants (HbS, HgC, etc)do  not significantly interfere with this assay.   However, presence of multiple variants may affect accuracy.     01/26/2018 8.6 (H) 4.0 - 5.6 % Final     Comment:     According to ADA guidelines, hemoglobin A1c <7.0% represents  optimal control in non-pregnant diabetic patients. Different  metrics may apply to specific " patient populations.   Standards of Medical Care in Diabetes-2016.  For the purpose of screening for the presence of diabetes:  <5.7%     Consistent with the absence of diabetes  5.7-6.4%  Consistent with increasing risk for diabetes   (prediabetes)  >or=6.5%  Consistent with diabetes  Currently, no consensus exists for use of hemoglobin A1c  for diagnosis of diabetes for children.  This Hemoglobin A1c assay has significant interference with fetal   hemoglobin   (HbF). The results are invalid for patients with abnormal amounts of   HbF,   including those with known Hereditary Persistence   of Fetal Hemoglobin. Heterozygous hemoglobin variants (HbAS, HbAC,   HbAD, HbAE, HbA2) do not significantly interfere with this assay;   however, presence of multiple variants in a sample may impact the %   interference.             Chemistry        Component Value Date/Time     (L) 01/23/2019 0850    K 4.7 01/23/2019 0850    CL 98 01/23/2019 0850    CO2 25 01/23/2019 0850    BUN 23 (H) 01/23/2019 0850    CREATININE 1.4 01/23/2019 0850     (H) 01/23/2019 0850        Component Value Date/Time    CALCIUM 10.4 01/23/2019 0850    ALKPHOS 56 01/23/2019 0850    AST 23 01/23/2019 0850    ALT 32 01/23/2019 0850    BILITOT 0.4 01/23/2019 0850    ESTGFRAFRICA >60.0 01/23/2019 0850    EGFRNONAA 58.2 (A) 01/23/2019 0850          Lab Results   Component Value Date    LDLCALC Invalid, Trig>400.0 07/27/2018          Ref. Range 7/27/2018 05:56   Cholesterol Latest Ref Range: 120 - 199 mg/dL 159   HDL Latest Ref Range: 40 - 75 mg/dL 28 (L)   HDL/Chol Ratio Latest Ref Range: 20.0 - 50.0 % 17.6 (L)   LDL Cholesterol Latest Ref Range: 63.0 - 159.0 mg/dL Invalid, Trig>400.0   Non-HDL Cholesterol Latest Units: mg/dL 131   Total Cholesterol/HDL Ratio Latest Ref Range: 2.0 - 5.0  5.7 (H)   Triglycerides Latest Ref Range: 30 - 150 mg/dL 656 (H)       Lab Results   Component Value Date    MICALBCREAT 17.4 07/27/2018       STANDARDS of CARE:         ASA:               Last eye exam:       ASSESSMENT and PLAN:    A1C GOAL:     1. Type 2 diabetes, uncontrolled, with neuropathy  Finish out your supply of Lantus right now - but change to 50 unit twice daily. Please try to keep the timing consistent.   When you finish Lantus, change from Lantus to Tresiba 100 units every day.     Work on taking Humalog doses before each meal and consider setting an alarm to remind you before lunch.     Carry Humalog pen with you so that you can inject within 15 minutes of your meal. Do not inject more than 1/2 hour after your meal.     Monitor glucose before meal and bedtime, 4x/day.     See Diabetes Educator/Registered Dietician for Medical Nutrition Therapy.     Return to clinic in 5-6 months.       insulin degludec (TRESIBA FLEXTOUCH U-200) 200 unit/mL (3 mL) InPn    Ambulatory Referral to Diabetes Education    ergocalciferol (ERGOCALCIFEROL) 50,000 unit Cap    Influenza - Quadrivalent (3 years & older) (PF)    Hemoglobin A1c    Vitamin D    Basic metabolic panel   2. Hypovitaminosis D  Vitamin D    For low vitamin D Take 1 capsule of ergocalciferol weekly for 12 weeks then take 2000 IU once daily (over the counter)   3. Essential hypertension  F/u with PCP today at scheduled visit.    4. Hyperlipidemia, unspecified hyperlipidemia type  Lipid panel  Continue pravastatin    5. Hypertriglyceridemia  Lipid panel       Orders Placed This Encounter   Procedures    Influenza - Quadrivalent (3 years & older) (PF)    Hemoglobin A1c     Standing Status:   Future     Standing Expiration Date:   4/22/2020    Vitamin D     Standing Status:   Future     Standing Expiration Date:   4/22/2020    Basic metabolic panel     Standing Status:   Future     Standing Expiration Date:   4/22/2020    Lipid panel     Standing Status:   Future     Standing Expiration Date:   2/22/2020    Ambulatory Referral to Diabetes Education     Referral Priority:   Routine     Referral Type:   Consultation      Referral Reason:   Specialty Services Required     Requested Specialty:   Endocrinology     Number of Visits Requested:   1     Expiration Date:   2/22/2020        Follow-up in about 5 months (around 7/22/2019).     Spent 40 minutes with patient with >50% time spent in counseling, as noted in # 1-5.

## 2019-02-22 NOTE — PROGRESS NOTES
Subjective:       Patient ID: Lopez Hicks is a 48 y.o. male.    Chief Complaint: Hypertension (follow up)    HPI   Pt here for 2 wk f/u regarding HTN. Pt was started on Losartan/HCTZ at last visit. BP readings at home are still running slightly high. No SE's from the medication.   Review of Systems   Constitutional: Negative for activity change, appetite change, chills, diaphoresis, fatigue, fever and unexpected weight change.   HENT: Negative for postnasal drip, rhinorrhea, sinus pressure, sneezing, sore throat, trouble swallowing and voice change.    Respiratory: Negative for cough, shortness of breath and wheezing.    Cardiovascular: Negative for chest pain, palpitations and leg swelling.   Gastrointestinal: Negative for abdominal pain, blood in stool, constipation, diarrhea, nausea and vomiting.   Genitourinary: Negative for dysuria.   Musculoskeletal: Negative for arthralgias and myalgias.   Skin: Negative for rash and wound.   Allergic/Immunologic: Negative for environmental allergies and food allergies.   Hematological: Negative for adenopathy. Does not bruise/bleed easily.       Objective:      Physical Exam   Constitutional: He is oriented to person, place, and time. He appears well-developed and well-nourished. No distress.   HENT:   Head: Normocephalic and atraumatic.   Eyes: Conjunctivae and EOM are normal. Pupils are equal, round, and reactive to light. Right eye exhibits no discharge. Left eye exhibits no discharge. No scleral icterus.   Neck: Normal range of motion. Neck supple. No JVD present.   Cardiovascular: Normal rate, regular rhythm and normal heart sounds.   No murmur heard.  Pulmonary/Chest: Effort normal and breath sounds normal. No respiratory distress. He has no wheezes. He has no rales.   Musculoskeletal: He exhibits no edema.   Lymphadenopathy:     He has no cervical adenopathy.   Neurological: He is alert and oriented to person, place, and time.   Skin: Skin is warm and dry. No  rash noted. He is not diaphoretic. No pallor.       Assessment:       1. HTN, goal below 130/80        Plan:    1. Rx Norvasc 5 mg daily and continue Losartan/HCTZ       Document BP BID   2. F/u in 2 wks

## 2019-02-22 NOTE — LETTER
February 22, 2019      Sukumar Mackey, DO  [address]  [place] [address]           [facility]  [address]  [place] [address]  Phone: [phone]  Fax: [phone]          Patient: Lopez Hicks   MR Number: [id number]   YOB: 1970   Date of Visit: 2/22/2019       Dear Dr. Sukumar Mackey:    Thank you for referring Lopez Hicks to me for evaluation. Attached you will find relevant portions of my assessment and plan of care.    If you have questions, please do not hesitate to call me. I look forward to following Lopez Hicks along with you.    Sincerely,    Mellisa Summers NP    Enclosure  CC:  No Recipients    If you would like to receive this communication electronically, please contact [email or web address] or [phone] to request more information on [employer] Link access.    For providers and/or their staff who would like to refer a patient to [facility], please contact us through our one-stop-shop provider referral line, [facility], at [phone].    If you feel you have received this communication in error or would no longer like to receive these types of communications, please e-mail [email or web address]

## 2019-02-22 NOTE — PATIENT INSTRUCTIONS
A1C goal: <7%  Fasting/premeal blood glucose goal:   2 hour post-meal blood glucose goal: less than 180    Finish out your supply of Lantus right now - but change to 50 unit twice daily. Please try to keep the timing consistent.   When you finish Lantus, change from Lantus to Tresiba 100 units every day.     Work on taking Humalog doses before each meal and consider setting an alarm to remind you before lunch.     Carry Humalog pen with you so that you can inject within 15 minutes of your meal. Do not inject more than 1/2 hour after your meal.     Monitor glucose before meal and bedtime, 4x/day.     See Diabetes Educator/Registered Dietician for Medical Nutrition Therapy.     Return to clinic in 5-6 months.       For low vitamin D Take 1 capsule of ergocalciferol weekly for 12 weeks then take 2000 IU once daily (over the counter)

## 2019-02-25 RX ORDER — ERGOCALCIFEROL 1.25 MG/1
CAPSULE ORAL
Qty: 12 CAPSULE | Refills: 0 | Status: SHIPPED | OUTPATIENT
Start: 2019-02-25 | End: 2019-07-16

## 2019-02-28 ENCOUNTER — PATIENT MESSAGE (OUTPATIENT)
Dept: ENDOSCOPY | Facility: HOSPITAL | Age: 49
End: 2019-02-28

## 2019-03-08 ENCOUNTER — OFFICE VISIT (OUTPATIENT)
Dept: INTERNAL MEDICINE | Facility: CLINIC | Age: 49
End: 2019-03-08
Payer: COMMERCIAL

## 2019-03-08 VITALS
RESPIRATION RATE: 18 BRPM | TEMPERATURE: 99 F | HEART RATE: 80 BPM | HEIGHT: 76 IN | BODY MASS INDEX: 34.55 KG/M2 | DIASTOLIC BLOOD PRESSURE: 90 MMHG | OXYGEN SATURATION: 97 % | WEIGHT: 283.75 LBS | SYSTOLIC BLOOD PRESSURE: 142 MMHG

## 2019-03-08 DIAGNOSIS — E11.59 HYPERTENSION ASSOCIATED WITH DIABETES: Primary | ICD-10-CM

## 2019-03-08 DIAGNOSIS — I15.2 HYPERTENSION ASSOCIATED WITH DIABETES: Primary | ICD-10-CM

## 2019-03-08 PROCEDURE — 99999 PR PBB SHADOW E&M-EST. PATIENT-LVL III: ICD-10-PCS | Mod: PBBFAC,,, | Performed by: INTERNAL MEDICINE

## 2019-03-08 PROCEDURE — 99213 PR OFFICE/OUTPT VISIT, EST, LEVL III, 20-29 MIN: ICD-10-PCS | Mod: S$GLB,,, | Performed by: INTERNAL MEDICINE

## 2019-03-08 PROCEDURE — 99999 PR PBB SHADOW E&M-EST. PATIENT-LVL III: CPT | Mod: PBBFAC,,, | Performed by: INTERNAL MEDICINE

## 2019-03-08 PROCEDURE — 99213 OFFICE O/P EST LOW 20 MIN: CPT | Mod: S$GLB,,, | Performed by: INTERNAL MEDICINE

## 2019-03-08 RX ORDER — AMLODIPINE BESYLATE 10 MG/1
10 TABLET ORAL DAILY
Qty: 90 TABLET | Refills: 3 | Status: SHIPPED | OUTPATIENT
Start: 2019-03-08 | End: 2020-03-20

## 2019-03-08 RX ORDER — INSULIN DEGLUDEC 200 U/ML
INJECTION, SOLUTION SUBCUTANEOUS
COMMUNITY
Start: 2019-02-22 | End: 2019-07-08 | Stop reason: SDUPTHER

## 2019-03-08 NOTE — PROGRESS NOTES
Subjective:       Patient ID: Lopez Hicks is a 48 y.o. male.    Chief Complaint: Follow-up (2 week follow up for HTN)    HPI   Pt here for 2 wk f/u regarding HTN. Norvasc was added at last check. BP readings at home are still running slightly high. No SE's from the medication.   Review of Systems   Constitutional: Negative for activity change, appetite change, chills, diaphoresis, fatigue, fever and unexpected weight change.   HENT: Negative for postnasal drip, rhinorrhea, sinus pressure, sneezing, sore throat, trouble swallowing and voice change.    Respiratory: Negative for cough, shortness of breath and wheezing.    Cardiovascular: Negative for chest pain, palpitations and leg swelling.   Gastrointestinal: Negative for abdominal pain, blood in stool, constipation, diarrhea, nausea and vomiting.   Genitourinary: Negative for dysuria.   Musculoskeletal: Negative for arthralgias and myalgias.   Skin: Negative for rash and wound.   Allergic/Immunologic: Negative for environmental allergies and food allergies.   Hematological: Negative for adenopathy. Does not bruise/bleed easily.       Objective:      Physical Exam   Constitutional: He is oriented to person, place, and time. He appears well-developed and well-nourished. No distress.   HENT:   Head: Normocephalic and atraumatic.   Right Ear: External ear normal.   Left Ear: External ear normal.   Nose: Nose normal.   Mouth/Throat: Oropharynx is clear and moist. No oropharyngeal exudate.   Eyes: Conjunctivae and EOM are normal. Pupils are equal, round, and reactive to light. Right eye exhibits no discharge. Left eye exhibits no discharge. No scleral icterus.   Neck: Normal range of motion. Neck supple. No JVD present.   Cardiovascular: Normal rate, regular rhythm and normal heart sounds.   No murmur heard.  Pulmonary/Chest: Effort normal and breath sounds normal. No respiratory distress. He has no wheezes. He has no rales.   Musculoskeletal: He exhibits no  edema.   Lymphadenopathy:     He has no cervical adenopathy.   Neurological: He is alert and oriented to person, place, and time.   Skin: Skin is warm and dry. No rash noted. He is not diaphoretic. No pallor.       Assessment:       1. Hypertension associated with diabetes        Plan:    1. Increase Norvasc to 10 mg and continue Losartan/HCTZ       Document BP BID   2. F/u in 2 wks

## 2019-03-13 ENCOUNTER — TELEPHONE (OUTPATIENT)
Dept: INTERNAL MEDICINE | Facility: CLINIC | Age: 49
End: 2019-03-13

## 2019-03-13 RX ORDER — PEN NEEDLE, DIABETIC 31 GX5/16"
NEEDLE, DISPOSABLE MISCELLANEOUS
Qty: 200 EACH | Refills: 11 | Status: SHIPPED | OUTPATIENT
Start: 2019-03-13 | End: 2020-03-27

## 2019-03-13 NOTE — TELEPHONE ENCOUNTER
"----- Message from Kassy Funes sent at 3/13/2019  8:42 AM CDT -----  Contact: [phone]  RX request - refill or new RX.  Is this a refill or new RX:  Refill   RX name and strength: BD ULTRA-FINE TENA PEN NEEDLES 32 gauge x 5/32" [facility]    Local pharmacy or mail order pharmacy:  [id number] - [place] - [address] [address] [facility] [address] [facility] [address]   [phone] (Phone)  [phone] (Fax)        Comments:  Please advise, thanks       "

## 2019-03-22 ENCOUNTER — OFFICE VISIT (OUTPATIENT)
Dept: INTERNAL MEDICINE | Facility: CLINIC | Age: 49
End: 2019-03-22
Payer: COMMERCIAL

## 2019-03-22 VITALS
TEMPERATURE: 98 F | BODY MASS INDEX: 34.31 KG/M2 | HEART RATE: 80 BPM | DIASTOLIC BLOOD PRESSURE: 84 MMHG | SYSTOLIC BLOOD PRESSURE: 150 MMHG | HEIGHT: 76 IN | WEIGHT: 281.75 LBS

## 2019-03-22 DIAGNOSIS — E11.59 HYPERTENSION ASSOCIATED WITH DIABETES: Primary | ICD-10-CM

## 2019-03-22 DIAGNOSIS — I15.2 HYPERTENSION ASSOCIATED WITH DIABETES: Primary | ICD-10-CM

## 2019-03-22 DIAGNOSIS — R41.840 DIFFICULTY CONCENTRATING: ICD-10-CM

## 2019-03-22 PROCEDURE — 99214 OFFICE O/P EST MOD 30 MIN: CPT | Mod: S$GLB,,, | Performed by: INTERNAL MEDICINE

## 2019-03-22 PROCEDURE — 99214 PR OFFICE/OUTPT VISIT, EST, LEVL IV, 30-39 MIN: ICD-10-PCS | Mod: S$GLB,,, | Performed by: INTERNAL MEDICINE

## 2019-03-22 PROCEDURE — 99999 PR PBB SHADOW E&M-EST. PATIENT-LVL III: CPT | Mod: PBBFAC,,, | Performed by: INTERNAL MEDICINE

## 2019-03-22 PROCEDURE — 99999 PR PBB SHADOW E&M-EST. PATIENT-LVL III: ICD-10-PCS | Mod: PBBFAC,,, | Performed by: INTERNAL MEDICINE

## 2019-03-22 RX ORDER — ATENOLOL 50 MG/1
50 TABLET ORAL DAILY
Qty: 30 TABLET | Refills: 0 | Status: SHIPPED | OUTPATIENT
Start: 2019-03-22 | End: 2019-04-21

## 2019-03-22 NOTE — PROGRESS NOTES
Subjective:       Patient ID: Lopez Hicks is a 48 y.o. male.    Chief Complaint: Follow-up (HTN)    HPI   Pt with hx of childhood ADD is here for 2 wk f/u regarding HTN. His Norvasc was increased and he continued on Losartan/HCTZ. BP readings at home are still running high. No SE's from the medications.   Review of Systems   Constitutional: Negative for activity change, appetite change, chills, diaphoresis, fatigue, fever and unexpected weight change.   HENT: Negative for postnasal drip, rhinorrhea, sinus pressure, sneezing, sore throat, trouble swallowing and voice change.    Respiratory: Negative for cough, shortness of breath and wheezing.    Cardiovascular: Negative for chest pain, palpitations and leg swelling.   Gastrointestinal: Negative for abdominal pain, blood in stool, constipation, diarrhea, nausea and vomiting.   Genitourinary: Negative for dysuria.   Musculoskeletal: Negative for arthralgias and myalgias.   Skin: Negative for rash and wound.   Allergic/Immunologic: Negative for environmental allergies and food allergies.   Hematological: Negative for adenopathy. Does not bruise/bleed easily.   Psychiatric/Behavioral: Positive for decreased concentration.       Objective:      Physical Exam   Constitutional: He is oriented to person, place, and time. He appears well-developed and well-nourished. No distress.   HENT:   Head: Normocephalic and atraumatic.   Eyes: Conjunctivae and EOM are normal. Pupils are equal, round, and reactive to light. Right eye exhibits no discharge. Left eye exhibits no discharge. No scleral icterus.   Neck: Normal range of motion. Neck supple. No JVD present.   Cardiovascular: Normal rate, regular rhythm and normal heart sounds.   No murmur heard.  Pulmonary/Chest: Effort normal and breath sounds normal. No respiratory distress. He has no wheezes. He has no rales.   Musculoskeletal: He exhibits no edema.   Lymphadenopathy:     He has no cervical adenopathy.    Neurological: He is alert and oriented to person, place, and time.   Skin: Skin is warm and dry. No rash noted. He is not diaphoretic. No pallor.       Assessment:       1. Hypertension associated with diabetes    2. Difficulty concentrating        Plan:    1. Rx Atenolol 50 mg daily and continue Norvasc/Losartan/HCTZ   2. Referral to Psychology to test for ADD

## 2019-03-26 RX ORDER — LOSARTAN POTASSIUM 100 MG/1
TABLET ORAL
Qty: 90 TABLET | Refills: 3 | Status: SHIPPED | OUTPATIENT
Start: 2019-03-26 | End: 2020-02-06

## 2019-04-22 RX ORDER — ATENOLOL 50 MG/1
TABLET ORAL
Qty: 90 TABLET | Refills: 3 | Status: SHIPPED | OUTPATIENT
Start: 2019-04-22 | End: 2019-05-21

## 2019-05-08 ENCOUNTER — TELEPHONE (OUTPATIENT)
Dept: INTERNAL MEDICINE | Facility: CLINIC | Age: 49
End: 2019-05-08

## 2019-05-08 RX ORDER — OMEPRAZOLE 40 MG/1
40 CAPSULE, DELAYED RELEASE ORAL DAILY
Qty: 90 CAPSULE | Refills: 1 | Status: SHIPPED | OUTPATIENT
Start: 2019-05-08 | End: 2019-11-07 | Stop reason: SDUPTHER

## 2019-05-08 RX ORDER — BUPROPION HYDROCHLORIDE 200 MG/1
200 TABLET, EXTENDED RELEASE ORAL 2 TIMES DAILY
Qty: 180 TABLET | Refills: 1 | Status: SHIPPED | OUTPATIENT
Start: 2019-05-08 | End: 2019-11-15 | Stop reason: SDUPTHER

## 2019-05-08 NOTE — TELEPHONE ENCOUNTER
----- Message from Catrina Laureano sent at 5/8/2019  9:21 AM CDT -----  Contact: Self [phone]  Patient is calling for an RX refill or new RX.  Is this a refill or new RX:  refill  RX name and strength: omeprazole (PRILOSEC) 40 MG capsule     buPROPion (WELLBUTRIN SR) 200 MG Tb12    Directions (copy/paste from chart):    Is this a 30 day or 90 day RX:  90 dyays  Local pharmacy or mail order pharmacy:    Pharmacy name and phone # (copy/paste from chart):   [id number] [place] - [address] [facility] [phone] (Phone)  [phone] (Fax)  Comments:

## 2019-05-21 ENCOUNTER — OFFICE VISIT (OUTPATIENT)
Dept: INTERNAL MEDICINE | Facility: CLINIC | Age: 49
End: 2019-05-21
Payer: COMMERCIAL

## 2019-05-21 VITALS
TEMPERATURE: 99 F | SYSTOLIC BLOOD PRESSURE: 153 MMHG | HEART RATE: 67 BPM | BODY MASS INDEX: 34.42 KG/M2 | WEIGHT: 282.63 LBS | HEIGHT: 76 IN | DIASTOLIC BLOOD PRESSURE: 83 MMHG

## 2019-05-21 DIAGNOSIS — E11.59 HYPERTENSION ASSOCIATED WITH DIABETES: Primary | ICD-10-CM

## 2019-05-21 DIAGNOSIS — I15.2 HYPERTENSION ASSOCIATED WITH DIABETES: Primary | ICD-10-CM

## 2019-05-21 PROCEDURE — 99999 PR PBB SHADOW E&M-EST. PATIENT-LVL III: ICD-10-PCS | Mod: PBBFAC,,, | Performed by: INTERNAL MEDICINE

## 2019-05-21 PROCEDURE — 99213 PR OFFICE/OUTPT VISIT, EST, LEVL III, 20-29 MIN: ICD-10-PCS | Mod: S$GLB,,, | Performed by: INTERNAL MEDICINE

## 2019-05-21 PROCEDURE — 99213 OFFICE O/P EST LOW 20 MIN: CPT | Mod: S$GLB,,, | Performed by: INTERNAL MEDICINE

## 2019-05-21 PROCEDURE — 99999 PR PBB SHADOW E&M-EST. PATIENT-LVL III: CPT | Mod: PBBFAC,,, | Performed by: INTERNAL MEDICINE

## 2019-05-21 RX ORDER — ATENOLOL 100 MG/1
100 TABLET ORAL DAILY
Qty: 90 TABLET | Refills: 3 | Status: SHIPPED | OUTPATIENT
Start: 2019-05-21 | End: 2020-04-01 | Stop reason: SDUPTHER

## 2019-05-21 NOTE — PROGRESS NOTES
Subjective:       Patient ID: Lopez Hicks is a 49 y.o. male.    Chief Complaint: Follow-up (on meds)    HPI   Pt here for f/u regarding HTN. Atenolol was added to his meds. BP readings at home are still running high. No SE's from the medications.   Review of Systems   Constitutional: Negative for activity change, appetite change, chills, diaphoresis, fatigue, fever and unexpected weight change.   HENT: Negative for postnasal drip, rhinorrhea, sinus pressure, sneezing, sore throat, trouble swallowing and voice change.    Respiratory: Negative for cough, shortness of breath and wheezing.    Cardiovascular: Negative for chest pain, palpitations and leg swelling.   Gastrointestinal: Negative for abdominal pain, blood in stool, constipation, diarrhea, nausea and vomiting.   Genitourinary: Negative for dysuria.   Musculoskeletal: Negative for arthralgias and myalgias.   Skin: Negative for rash and wound.   Allergic/Immunologic: Negative for environmental allergies and food allergies.   Hematological: Negative for adenopathy. Does not bruise/bleed easily.       Objective:      Physical Exam   Constitutional: He is oriented to person, place, and time. He appears well-developed and well-nourished. No distress.   HENT:   Head: Normocephalic and atraumatic.   Eyes: Pupils are equal, round, and reactive to light. Conjunctivae and EOM are normal. Right eye exhibits no discharge. Left eye exhibits no discharge. No scleral icterus.   Neck: Normal range of motion. Neck supple. No JVD present.   Cardiovascular: Normal rate, regular rhythm and normal heart sounds.   No murmur heard.  Pulmonary/Chest: Effort normal and breath sounds normal. No respiratory distress. He has no wheezes. He has no rales.   Musculoskeletal: He exhibits no edema.   Lymphadenopathy:     He has no cervical adenopathy.   Neurological: He is alert and oriented to person, place, and time.   Skin: Skin is warm and dry. No rash noted. He is not  diaphoretic. No pallor.       Assessment:       1. Hypertension associated with diabetes        Plan:    1. Increase Atenolol to 100 mg daily and continue Norvasc/HCTZ/Losartan       Document BP BID   2. F/u in 2 wks

## 2019-06-06 ENCOUNTER — TELEPHONE (OUTPATIENT)
Dept: INTERNAL MEDICINE | Facility: CLINIC | Age: 49
End: 2019-06-06

## 2019-06-06 RX ORDER — PRAVASTATIN SODIUM 40 MG/1
40 TABLET ORAL DAILY
Qty: 90 TABLET | Refills: 3 | Status: SHIPPED | OUTPATIENT
Start: 2019-06-06 | End: 2020-07-02

## 2019-06-06 NOTE — TELEPHONE ENCOUNTER
----- Message from Sophie Mobley sent at 6/6/2019  9:41 AM CDT -----  Contact: Pt self Mobile/Home [phone]  Patient is calling for an RX refill or new RX.  Is this a refill or new RX:  Refill  RX name and strength: pravastatin (PRAVACHOL) 40 MG tablet  Humalog pen  Directions (copy/paste from chart):  N/A  Is this a 30 day or 90 day RX:  90  Local pharmacy or mail order pharmacy:  [id number] [address] [facility]  Pharmacy name and phone #    [place] Phone# [phone],fax# [phone]  Comments:  Patient said another doctor prescribed this script for him but he would like for you to refill it please.

## 2019-06-14 ENCOUNTER — TELEPHONE (OUTPATIENT)
Dept: INTERNAL MEDICINE | Facility: CLINIC | Age: 49
End: 2019-06-14

## 2019-06-14 DIAGNOSIS — E11.42 TYPE 2 DIABETES MELLITUS WITH DIABETIC POLYNEUROPATHY, WITH LONG-TERM CURRENT USE OF INSULIN: ICD-10-CM

## 2019-06-14 DIAGNOSIS — Z79.4 TYPE 2 DIABETES MELLITUS WITH DIABETIC POLYNEUROPATHY, WITH LONG-TERM CURRENT USE OF INSULIN: ICD-10-CM

## 2019-06-14 RX ORDER — INSULIN LISPRO 100 [IU]/ML
INJECTION, SOLUTION INTRAVENOUS; SUBCUTANEOUS
Qty: 2 BOX | Refills: 3 | Status: SHIPPED | OUTPATIENT
Start: 2019-06-14 | End: 2019-07-01 | Stop reason: SDUPTHER

## 2019-06-14 RX ORDER — INSULIN LISPRO 100 [IU]/ML
INJECTION, SOLUTION INTRAVENOUS; SUBCUTANEOUS
Qty: 2 BOX | Refills: 2 | Status: SHIPPED | OUTPATIENT
Start: 2019-06-14 | End: 2019-06-14 | Stop reason: SDUPTHER

## 2019-06-14 NOTE — TELEPHONE ENCOUNTER
Pharmacy faxed request stating pt needs new prescription for Humalog pen . LOV 2/22/19 with YVAN Summers

## 2019-06-14 NOTE — TELEPHONE ENCOUNTER
----- Message from Abi Li sent at 6/14/2019 10:53 AM CDT -----  Contact: self   Patient is calling for an RX refill or new RX.  Is this a refill or new RX:  refill  RX name and strength: insulin lispro (HUMALOG KWIKPEN INSULIN) 100 unit/mL InPn pen  Directions (copy/paste from chart):    Is this a 30 day or 90 day RX:    Local pharmacy or mail order pharmacy: [id number] [place] [address] [address] [facility] [address] [phone] (Phone)  [phone] (Fax)

## 2019-06-14 NOTE — TELEPHONE ENCOUNTER
----- Message from Abi Li sent at 6/14/2019 10:53 AM CDT -----  Contact: self   Patient is calling for an RX refill or new RX.  Is this a refill or new RX:  refill  RX name and strength: insulin lispro (HUMALOG KWIKPEN INSULIN) 100 unit/mL InPn pen  Directions (copy/paste from chart):    Is this a 30 day or 90 day RX:    Local pharmacy or mail order pharmacy: [id number] - [place] [address] [facility] [address] [phone] (Phone)  [phone] (Fax)

## 2019-07-01 DIAGNOSIS — E11.42 TYPE 2 DIABETES MELLITUS WITH DIABETIC POLYNEUROPATHY, WITH LONG-TERM CURRENT USE OF INSULIN: ICD-10-CM

## 2019-07-01 DIAGNOSIS — Z79.4 TYPE 2 DIABETES MELLITUS WITH DIABETIC POLYNEUROPATHY, WITH LONG-TERM CURRENT USE OF INSULIN: ICD-10-CM

## 2019-07-01 RX ORDER — INSULIN LISPRO 100 [IU]/ML
INJECTION, SOLUTION INTRAVENOUS; SUBCUTANEOUS
Qty: 6 BOX | Refills: 1 | Status: SHIPPED | OUTPATIENT
Start: 2019-07-01 | End: 2020-07-29 | Stop reason: SDUPTHER

## 2019-07-01 NOTE — TELEPHONE ENCOUNTER
----- Message from Nnamdi Byrne sent at 7/1/2019 10:47 AM CDT -----  Contact: Self: [phone]  Type: RX Refill Request    Who Called: Self    Refill or New Rx:Refill    RX Name and Strength:  insulin lispro (HUMALOG KWIKPEN INSULIN) 100 unit/mL pen  Glucometer Strips      How is the patient currently taking it? (2XDay)    Is this a 30 day or 90 day RX:90 Day    Preferred Pharmacy with phone number:.[id number] - [place] - [address] [address]  [address] [address]  [place] [address]  Phone: [phone] Fax: [phone]      Local or Mail Order:Local    Ordering Provider:Dr. Summers    Would the patient rather a call back or a response via [facility]? CALLBACK    Best Call Back Number:[phone]    Additional Information: N/A

## 2019-07-08 ENCOUNTER — TELEPHONE (OUTPATIENT)
Dept: ENDOCRINOLOGY | Facility: CLINIC | Age: 49
End: 2019-07-08

## 2019-07-08 RX ORDER — INSULIN DEGLUDEC 200 U/ML
INJECTION, SOLUTION SUBCUTANEOUS
Qty: 6 SYRINGE | Refills: 5 | Status: SHIPPED | OUTPATIENT
Start: 2019-07-08 | End: 2020-06-01 | Stop reason: SDUPTHER

## 2019-07-08 NOTE — TELEPHONE ENCOUNTER
----- Message from Leslie Maciel sent at 7/8/2019  8:44 AM CDT -----  Contact: [facility] [place]  Type:  Pharmacy Calling to Clarify an RX    Name of Caller: [facility] [place]    [facility] Name: [id number] - [place] - [address] [phone] (Phone)  [phone] (Fax)      Prescription Name: Test strip    What do they need to clarify? Needs a date on the prescription     Best Call Back Number: [phone]    Additional Information:

## 2019-07-08 NOTE — TELEPHONE ENCOUNTER
----- Message from Buffy Hoang sent at 7/8/2019  2:41 PM CDT -----  Contact: self [phone]  .Type: Patient Call Back    Who called: self     What is the request in detail: Pt called to check on refill request for test strips    Can the clinic reply by [facility]? Call back    Would the patient rather a call back or a response via [facility]? Call back     Best call back number: [phone]

## 2019-07-11 ENCOUNTER — PATIENT OUTREACH (OUTPATIENT)
Dept: ADMINISTRATIVE | Facility: OTHER | Age: 49
End: 2019-07-11

## 2019-07-11 DIAGNOSIS — Z12.11 SCREEN FOR COLON CANCER: Primary | ICD-10-CM

## 2019-07-16 ENCOUNTER — OFFICE VISIT (OUTPATIENT)
Dept: ENDOCRINOLOGY | Facility: CLINIC | Age: 49
End: 2019-07-16
Payer: COMMERCIAL

## 2019-07-16 VITALS
SYSTOLIC BLOOD PRESSURE: 124 MMHG | WEIGHT: 281.44 LBS | BODY MASS INDEX: 34.26 KG/M2 | DIASTOLIC BLOOD PRESSURE: 76 MMHG | HEART RATE: 64 BPM

## 2019-07-16 DIAGNOSIS — I10 ESSENTIAL HYPERTENSION: ICD-10-CM

## 2019-07-16 DIAGNOSIS — E78.1 HYPERTRIGLYCERIDEMIA: ICD-10-CM

## 2019-07-16 DIAGNOSIS — E78.5 HYPERLIPIDEMIA, UNSPECIFIED HYPERLIPIDEMIA TYPE: ICD-10-CM

## 2019-07-16 DIAGNOSIS — E55.9 HYPOVITAMINOSIS D: ICD-10-CM

## 2019-07-16 DIAGNOSIS — Z71.9 VISIT FOR COUNSELING: ICD-10-CM

## 2019-07-16 PROCEDURE — 99215 PR OFFICE/OUTPT VISIT, EST, LEVL V, 40-54 MIN: ICD-10-PCS | Mod: S$GLB,,, | Performed by: NURSE PRACTITIONER

## 2019-07-16 PROCEDURE — 99999 PR PBB SHADOW E&M-EST. PATIENT-LVL IV: CPT | Mod: PBBFAC,,, | Performed by: NURSE PRACTITIONER

## 2019-07-16 PROCEDURE — 99999 PR PBB SHADOW E&M-EST. PATIENT-LVL IV: ICD-10-PCS | Mod: PBBFAC,,, | Performed by: NURSE PRACTITIONER

## 2019-07-16 PROCEDURE — 99215 OFFICE O/P EST HI 40 MIN: CPT | Mod: S$GLB,,, | Performed by: NURSE PRACTITIONER

## 2019-07-16 RX ORDER — CHOLECALCIFEROL (VITAMIN D3) 25 MCG
1000 TABLET ORAL DAILY
COMMUNITY

## 2019-07-16 RX ORDER — METFORMIN HYDROCHLORIDE 500 MG/1
1000 TABLET, EXTENDED RELEASE ORAL 2 TIMES DAILY WITH MEALS
Qty: 360 TABLET | Refills: 1 | Status: SHIPPED | OUTPATIENT
Start: 2019-07-16 | End: 2020-02-28 | Stop reason: SDUPTHER

## 2019-07-16 NOTE — PROGRESS NOTES
CC: This 49 y.o. White male  is here for evaluation of  T2DM along with comorbidities indicated in the Visit Diagnosis section of this encounter.    HPI: Lopez Hicks was diagnosed with T2DM in early 20s, diagnosed upon routine labs.     Initial visit on 2/22/19  Last seen by Dr. Randle in August 2018. At that lov, Yariel was dc'd and he was advised to take Humalog ac (not just before dinner). She had also advised him to decrease Lantus from 100 to 80 units but resumed 100 units d/t hyperglycemia.   Latest A1c up from 8 to 10.1% in January.   He has since improved diet and medication adherence - better now about his insulin doses at breakfast/lunch. Also restarted testing his BGs.   Plan Finish out your supply of Lantus right now - but change to 50 unit twice daily. Please try to keep the timing consistent.   When you finish Lantus, change from Lantus to Tresiba 100 units every day.   Work on taking Humalog doses before each meal and consider setting an alarm to remind you before lunch.   Carry Humalog pen with you so that you can inject within 15 minutes of your meal. Do not inject more than 1/2 hour after your meal.   Monitor glucose before meal and bedtime, 4x/day.   See Diabetes Educator/Registered Dietician for Medical Nutrition Therapy.   Return to clinic in 5-6 months.       Interval history  a1c down from 10.1 to 7.4%. Triglycerides down from 656 to 307. He started eating better.  He did increase Tresiba from 100 to 120 units/day about 2 months ago.   He's been on vitamin D OTC for about 2 months now. Vitamin D level up to 32 now.    LAST DIABETES EDUCATION: missed appt - doesn't want to reschedule.     HOSPITALIZED FOR DIABETES -  No.    DIABETES MEDICATIONS: Janumet ( mg) 1 tab bid, Humalog Kwikpen 40 units before meals, Tresiba 60 units bid   Misses medication doses - Yes -     Misses Humalog before lunch and breakfast sometimes, takes it most of the time - this is an improvement.   He  does carry Humalog to work and out to eat.     DM COMPLICATIONS: peripheral neuropathy    SIGNIFICANT DIABETES MED HISTORY: denies intolerance to prior DM meds. Cites that Bydureon was ineffective; was dc'd in 8/2018    SELF MONITORING BLOOD GLUCOSE: Checks blood glucose at home 1x/day - fasting glucoses 130-150s     HYPOGLYCEMIC EPISODES: very infrequent; maybe once every few months.      CURRENT DIET: drinks diet coke and unsweet tea. Eats 2-3 meals/day. States he quit eating carbs and fried foods.     Diet recall: dinner was shrimp fajitas without tortillas but with a handful of tortilla chips, onions, and bell peppers, unsweet tea (injected 40 units humalog); banana at 2 am. Breakfast was protein bar (inejcted humalog 40 units) and didn't eat until 3 to 4 pm - ate baked shrimp with a handful wheat tortillas (no Humalog).     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 No acute distress.   PSYCH: AAOx3, appropriate mood and affect, conversant, well-groomed. Judgement and insight good.   NEURO: Cranial nerves grossly intact. Speech clear, no tremor.   CHEST: Respirations even and unlabored.   MS: Gait steady. No clubbing.         Hemoglobin A1C   Date Value Ref Range Status   07/08/2019 7.4 (H) 4.0 - 5.6 % Final     Comment:     ADA Screening Guidelines:  5.7-6.4%  Consistent with prediabetes  >or=6.5%  Consistent with diabetes  High levels of fetal hemoglobin interfere with the HbA1C  assay. Heterozygous hemoglobin variants (HbS, HgC, etc)do  not significantly  interfere with this assay.   However, presence of multiple variants may affect accuracy.     01/23/2019 10.1 (H) 4.0 - 5.6 % Final     Comment:     ADA Screening Guidelines:  5.7-6.4%  Consistent with prediabetes  >or=6.5%  Consistent with diabetes  High levels of fetal hemoglobin interfere with the HbA1C  assay. Heterozygous hemoglobin variants (HbS, HgC, etc)do  not significantly interfere with this assay.   However, presence of multiple variants may affect accuracy.     07/27/2018 8.0 (H) 4.0 - 5.6 % Final     Comment:     ADA Screening Guidelines:  5.7-6.4%  Consistent with prediabetes  >or=6.5%  Consistent with diabetes  High levels of fetal hemoglobin interfere with the HbA1C  assay. Heterozygous hemoglobin variants (HbS, HgC, etc)do  not significantly interfere with this assay.   However, presence of multiple variants may affect accuracy.             Chemistry        Component Value Date/Time     07/08/2019 0703    K 3.8 07/08/2019 0703     07/08/2019 0703    CO2 26 07/08/2019 0703    BUN 24 (H) 07/08/2019 0703    CREATININE 1.26 07/08/2019 0703     (H) 07/08/2019 0703        Component Value Date/Time    CALCIUM 9.9 07/08/2019 0703    ALKPHOS 56 01/23/2019 0850    AST 23 01/23/2019 0850    ALT 32 01/23/2019 0850    BILITOT 0.4 01/23/2019 0850    ESTGFRAFRICA >60.0 07/08/2019 0703    EGFRNONAA >60.0 07/08/2019 0703          Lab Results   Component Value Date    LDLCALC 33.6 (L) 07/08/2019          Ref. Range 7/27/2018 05:56   Cholesterol Latest Ref Range: 120 - 199 mg/dL 159   HDL Latest Ref Range: 40 - 75 mg/dL 28 (L)   HDL/Chol Ratio Latest Ref Range: 20.0 - 50.0 % 17.6 (L)   LDL Cholesterol Latest Ref Range: 63.0 - 159.0 mg/dL Invalid, Trig>400.0   Non-HDL Cholesterol Latest Units: mg/dL 131   Total Cholesterol/HDL Ratio Latest Ref Range: 2.0 - 5.0  5.7 (H)   Triglycerides Latest Ref Range: 30 - 150 mg/dL 656 (H)       Lab Results   Component Value Date    MICALBCREAT 17.4 07/27/2018        STANDARDS of CARE:        ASA:               Last eye exam:       ASSESSMENT and PLAN:    A1C GOAL: < 7 %           1. Type 2 diabetes, uncontrolled, with neuropathy  It is surprising that he is not experiencing hypoglycemia after taking large doses of Humalog after very low carb meals. Advised him to have a balanced diet and he should consume at least 15-30 grams of CHO/meal.   Would like to wean patient off Humalog -     You can take Tresiba 120 units ONCE daily at night. Start by taking 60 units tonight, skip tomorrow morning's dose, and then inject 120 units every night starting tomorrow night.     Stop Janumet.   Start metformin XR 1000 mg twice daily (=2 tablets with breakfast and 2 tablets with dinner).     Start Trulicity 0.75 mg once weekly for 4 weeks and lower Humalog from 40 to 20 units before meals. Then increase to Trulicity 1.5 mg once weekly and you should be able to stop Humalog at this point.     Monitor blood sugars before meals and bedtime.   You can learn how to use the Freestyle kayleigh by looking up the YouTube video made by the company or see diabetes educator.     Monitor blood sugar every 8 hours in order to get a continuous glucose reading from the [employer] Kayleigh. You can use an iPhone daniel as your reader.     Return to clinic in 6 weeks with blood sugar logs or with the Kayleigh reader if you're using it.        2. Hypovitaminosis D  wnl - continue otc vitamin D   3. Essential hypertension  controlled   4. Hypertriglyceridemia  Much improved with better glucose control.    5. Hyperlipidemia, unspecified hyperlipidemia type  Continue pravastatin.      Spent 40 minutes with patient with >50% time spent in counseling, as noted in # 1-4.        No orders of the defined types were placed in this encounter.       Follow up in about 6 weeks (around 8/27/2019).

## 2019-07-16 NOTE — PATIENT INSTRUCTIONS
You can take Tresiba 120 units ONCE daily at night. Start by taking 60 units tonight, skip tomorrow morning's dose, and then inject 120 units every night starting tomorrow night.     Stop Janumet.   Start metformin XR 1000 mg twice daily (=2 tablets with breakfast and 2 tablets with dinner).     Start Trulicity 0.75 mg once weekly for 4 weeks and lower Humalog from 40 to 20 units before meals. Then increase to Trulicity 1.5 mg once weekly and you should be able to stop Humalog at this point.     Monitor blood sugars before meals and bedtime.   You can learn how to use the Freestyle kayleigh by looking up the YouTube video made by the company or see diabetes educator.     Monitor blood sugar every 8 hours in order to get a continuous glucose reading from the Freestyle Kayleigh. You can use an [employer] daniel as your reader.     Return to clinic in 6 weeks with blood sugar logs or with the Kayleigh reader if you're using it.

## 2019-11-08 RX ORDER — OMEPRAZOLE 40 MG/1
CAPSULE, DELAYED RELEASE ORAL
Qty: 90 CAPSULE | Refills: 0 | Status: SHIPPED | OUTPATIENT
Start: 2019-11-08 | End: 2020-02-07

## 2019-11-16 RX ORDER — BUPROPION HYDROCHLORIDE 200 MG/1
TABLET, EXTENDED RELEASE ORAL
Qty: 180 TABLET | Refills: 1 | Status: SHIPPED | OUTPATIENT
Start: 2019-11-16 | End: 2020-05-19

## 2019-12-06 ENCOUNTER — TELEPHONE (OUTPATIENT)
Dept: ENDOCRINOLOGY | Facility: CLINIC | Age: 49
End: 2019-12-06

## 2019-12-06 NOTE — TELEPHONE ENCOUNTER
Called pt to schedule a f/u appt to see Mellisa and to have labs drawn. Pt stated he will call back on Monday to schedule his appointments. Pt had no questions at this time.

## 2019-12-21 RX ORDER — HYDROCHLOROTHIAZIDE 25 MG/1
TABLET ORAL
Qty: 90 TABLET | Refills: 0 | Status: SHIPPED | OUTPATIENT
Start: 2019-12-21 | End: 2020-06-10

## 2019-12-26 NOTE — TELEPHONE ENCOUNTER
Spoke with pt about making an appt. For any further refills he says he has been trying to make an appt. But was told [facility]isa was booked for the remainder of 2019. He states he will call back next week once he knows his schedule to make a F/U appt. He states he has done his labs 1-2 weeks ago.

## 2019-12-26 NOTE — TELEPHONE ENCOUNTER
Needs to schedule a f/u appt. Please let him know that no more refills will be sent until he is seen in clinic.

## 2019-12-27 ENCOUNTER — TELEPHONE (OUTPATIENT)
Dept: ENDOCRINOLOGY | Facility: CLINIC | Age: 49
End: 2019-12-27

## 2019-12-27 NOTE — TELEPHONE ENCOUNTER
"----- Message from Tani Melton MA sent at 12/26/2019  3:45 PM CST -----  Contact: Self      ----- Message -----  From: Tyler Schroeder  Sent: 12/26/2019   3:38 PM CST  To: [facility] Staff    Type: Patient Call Back    Who called:Self    What is the request in detail: He states he would like a 90-day prescription since he has met out of pocket cost.    "trulicity"    Can the clinic reply by [facility]?No    Would the patient rather a call back or a response via [facility]? Call    Best call back number:[phone]    Additional Information:n/a      "

## 2020-01-09 RX ORDER — ATENOLOL 50 MG/1
TABLET ORAL
Qty: 90 TABLET | Refills: 3 | OUTPATIENT
Start: 2020-01-09

## 2020-01-25 RX ORDER — ATENOLOL 50 MG/1
TABLET ORAL
Qty: 90 TABLET | Refills: 3 | OUTPATIENT
Start: 2020-01-25

## 2020-02-05 RX ORDER — ATENOLOL 50 MG/1
TABLET ORAL
Qty: 90 TABLET | Refills: 2 | OUTPATIENT
Start: 2020-02-05

## 2020-02-06 ENCOUNTER — TELEPHONE (OUTPATIENT)
Dept: INTERNAL MEDICINE | Facility: CLINIC | Age: 50
End: 2020-02-06

## 2020-02-06 RX ORDER — LOSARTAN POTASSIUM 100 MG/1
TABLET ORAL
Qty: 90 TABLET | Refills: 2 | Status: SHIPPED | OUTPATIENT
Start: 2020-02-06 | End: 2020-02-06

## 2020-02-06 RX ORDER — LOSARTAN POTASSIUM 50 MG/1
100 TABLET ORAL DAILY
Qty: 180 TABLET | Refills: 3 | Status: SHIPPED | OUTPATIENT
Start: 2020-02-06 | End: 2021-02-01

## 2020-02-06 NOTE — TELEPHONE ENCOUNTER
conchis rose says losartan 100mg on backorder.    Ok to change rx to 50mg  Take 2 ?    Please send new rx if so.    Thanks angelique

## 2020-02-07 RX ORDER — OMEPRAZOLE 40 MG/1
CAPSULE, DELAYED RELEASE ORAL
Qty: 90 CAPSULE | Refills: 1 | Status: SHIPPED | OUTPATIENT
Start: 2020-02-07 | End: 2020-06-10

## 2020-02-15 RX ORDER — ATENOLOL 50 MG/1
TABLET ORAL
Qty: 90 TABLET | Refills: 2 | OUTPATIENT
Start: 2020-02-15

## 2020-02-21 RX ORDER — FENOFIBRATE 145 MG/1
TABLET, FILM COATED ORAL
Qty: 90 TABLET | Refills: 2 | Status: SHIPPED | OUTPATIENT
Start: 2020-02-21 | End: 2020-11-27

## 2020-02-28 RX ORDER — METFORMIN HYDROCHLORIDE 500 MG/1
1000 TABLET, EXTENDED RELEASE ORAL 2 TIMES DAILY WITH MEALS
Qty: 360 TABLET | Refills: 0 | Status: SHIPPED | OUTPATIENT
Start: 2020-02-28 | End: 2020-06-02 | Stop reason: SDUPTHER

## 2020-03-20 RX ORDER — AMLODIPINE BESYLATE 10 MG/1
TABLET ORAL
Qty: 90 TABLET | Refills: 2 | Status: SHIPPED | OUTPATIENT
Start: 2020-03-20 | End: 2020-09-24

## 2020-03-27 RX ORDER — PEN NEEDLE, DIABETIC 32GX 5/32"
NEEDLE, DISPOSABLE MISCELLANEOUS
Qty: 200 EACH | Refills: 10 | Status: SHIPPED | OUTPATIENT
Start: 2020-03-27 | End: 2021-02-01 | Stop reason: SDUPTHER

## 2020-03-30 RX ORDER — ATENOLOL 50 MG/1
TABLET ORAL
Qty: 90 TABLET | Refills: 3 | OUTPATIENT
Start: 2020-03-30

## 2020-04-01 ENCOUNTER — TELEPHONE (OUTPATIENT)
Dept: INTERNAL MEDICINE | Facility: CLINIC | Age: 50
End: 2020-04-01

## 2020-04-01 RX ORDER — AMLODIPINE BESYLATE 10 MG/1
10 TABLET ORAL DAILY
Qty: 90 TABLET | Refills: 2 | Status: CANCELLED | OUTPATIENT
Start: 2020-04-01

## 2020-04-01 RX ORDER — ATENOLOL 100 MG/1
100 TABLET ORAL DAILY
Qty: 90 TABLET | Refills: 3 | Status: SHIPPED | OUTPATIENT
Start: 2020-04-01 | End: 2021-05-20

## 2020-04-01 NOTE — TELEPHONE ENCOUNTER
----- Message from Bina Dickey sent at 4/1/2020  1:31 PM CDT -----  Contact: [phone]  Requesting an RX refill or new RX.  Is this a refill or new RX:  Refill  RX name and strength: amLODIPine (NORVASC) 10 MG tablet  Directions (copy/paste from chart):   TAKE 1 TABLET BY MOUTH ONCE DAILY  Is this a 30 day or 90 day RX:  90  Local pharmacy or mail order pharmacy:local     Pharmacy name and phone # (copy/paste from chart): [facility]  [phone] (Phone)  [phone] (Fax)  Comments:

## 2020-04-01 NOTE — TELEPHONE ENCOUNTER
rx was sent by erx on 3/20/20 and we have confirmation pharmacy got it.    They told him to call us fo refill.    I got voicemail only at pharmacy, they were at lunch, I left rx on their recorder as approved erx 3/20/20 by dr bryant and said to call if a problem filling it.

## 2020-05-08 ENCOUNTER — TELEPHONE (OUTPATIENT)
Dept: ENDOCRINOLOGY | Facility: CLINIC | Age: 50
End: 2020-05-08

## 2020-05-08 NOTE — TELEPHONE ENCOUNTER
Tried contacting pt to inform him that [facility] sent a 1 month supply of Trulicity to his pharmacy. He is due for a visit and Mellisa would like to do an audio/virtual visit. The pt did not answer LVM to return call with availability.

## 2020-05-19 RX ORDER — BUPROPION HYDROCHLORIDE 200 MG/1
TABLET, EXTENDED RELEASE ORAL
Qty: 180 TABLET | Refills: 0 | Status: SHIPPED | OUTPATIENT
Start: 2020-05-19 | End: 2020-08-21

## 2020-06-01 RX ORDER — METFORMIN HYDROCHLORIDE 500 MG/1
1000 TABLET, EXTENDED RELEASE ORAL 2 TIMES DAILY WITH MEALS
Qty: 120 TABLET | Refills: 0 | Status: SHIPPED | OUTPATIENT
Start: 2020-06-01 | End: 2020-06-25 | Stop reason: SDUPTHER

## 2020-06-01 RX ORDER — INSULIN DEGLUDEC 200 U/ML
INJECTION, SOLUTION SUBCUTANEOUS
Qty: 3 SYRINGE | Refills: 0 | Status: SHIPPED | OUTPATIENT
Start: 2020-06-01 | End: 2020-07-29 | Stop reason: SDUPTHER

## 2020-06-01 NOTE — TELEPHONE ENCOUNTER
Needs to make appt (audio, virtual or in clinic) for any further refills. Ideally get labs prior.    Post-Care Instructions: I reviewed with the patient in detail post-care instructions. Patient is to wear sunprotection, and avoid picking at any of the treated lesions. Pt may apply Vaseline to crusted or scabbing areas. Consent: The patient's consent was obtained including but not limited to risks of crusting, scabbing, blistering, scarring, darker or lighter pigmentary change, recurrence, incomplete removal and infection. Detail Level (Bills Only For Genitals Or Anus, Choose Benign Destruction If You Are Treating A Different Area): Detailed Render Post-Care Instructions In Note?: no

## 2020-06-01 NOTE — TELEPHONE ENCOUNTER
Tried contacting pt to inform him that his refills for his medications has been sent but that he needs to make an appt (audio, virtual or in clinic) for any further refills. Ideally get labs prior. The pt did not answer LVM to return call.

## 2020-06-01 NOTE — TELEPHONE ENCOUNTER
----- Message from Dian Barillas sent at 6/1/2020 10:59 AM CDT -----  Contact: Self [phone]  Type: RX Refill Request    Who Called: Self     Have you contacted your pharmacy: yes     Refill or New Rx: refill     RX Name and Strength: metFORMIN (GLUCOPHAGE-XR) 500 MG XR 24hr tablet & dulaglutide (TRULICITY) 1.5 mg/0.5 mL PnIj & insulin degludec (TRESIBA FLEXTOUCH U-200) 200 unit/mL (3 mL) InPn     Is this a 30 day or 90 day RX: 90 day     Preferred Pharmacy with phone number: [id number] - [place] - [address] [facility] [phone] (Phone)  [phone] (Fax)    Local or Mail Order: local    Would the patient rather a call back or a response via [facility]? Call back     Best Call Back Number: [phone]

## 2020-06-02 RX ORDER — METFORMIN HYDROCHLORIDE 500 MG/1
TABLET, EXTENDED RELEASE ORAL
Qty: 360 TABLET | Refills: 0 | Status: SHIPPED | OUTPATIENT
Start: 2020-06-02 | End: 2020-06-25 | Stop reason: SDUPTHER

## 2020-06-10 RX ORDER — HYDROCHLOROTHIAZIDE 25 MG/1
TABLET ORAL
Qty: 90 TABLET | Refills: 0 | Status: SHIPPED | OUTPATIENT
Start: 2020-06-10 | End: 2020-09-11

## 2020-06-10 RX ORDER — OMEPRAZOLE 40 MG/1
CAPSULE, DELAYED RELEASE ORAL
Qty: 90 CAPSULE | Refills: 1 | Status: SHIPPED | OUTPATIENT
Start: 2020-06-10 | End: 2021-01-21

## 2020-06-16 ENCOUNTER — TELEPHONE (OUTPATIENT)
Dept: ENDOCRINOLOGY | Facility: CLINIC | Age: 50
End: 2020-06-16

## 2020-06-16 NOTE — TELEPHONE ENCOUNTER
----- Message from Asher Jordan sent at 6/16/2020  9:30 AM CDT -----   Name of Who is Calling:     What is the request in detail: patient request call back in reference to appointment Please contact to further discuss and advise      Can the clinic reply by [facility]: no     [facility]:  [phone]

## 2020-06-25 RX ORDER — METFORMIN HYDROCHLORIDE 500 MG/1
1000 TABLET, EXTENDED RELEASE ORAL 2 TIMES DAILY WITH MEALS
Qty: 360 TABLET | Refills: 2 | Status: SHIPPED | OUTPATIENT
Start: 2020-06-25 | End: 2021-07-06

## 2020-07-02 RX ORDER — PRAVASTATIN SODIUM 40 MG/1
TABLET ORAL
Qty: 90 TABLET | Refills: 3 | Status: SHIPPED | OUTPATIENT
Start: 2020-07-02 | End: 2021-06-17

## 2020-07-14 ENCOUNTER — LAB VISIT (OUTPATIENT)
Dept: LAB | Facility: HOSPITAL | Age: 50
End: 2020-07-14
Attending: INTERNAL MEDICINE
Payer: COMMERCIAL

## 2020-07-14 ENCOUNTER — OFFICE VISIT (OUTPATIENT)
Dept: INTERNAL MEDICINE | Facility: CLINIC | Age: 50
End: 2020-07-14
Payer: COMMERCIAL

## 2020-07-14 VITALS
HEIGHT: 76 IN | BODY MASS INDEX: 32.85 KG/M2 | SYSTOLIC BLOOD PRESSURE: 130 MMHG | HEART RATE: 76 BPM | WEIGHT: 269.81 LBS | DIASTOLIC BLOOD PRESSURE: 88 MMHG | TEMPERATURE: 98 F | RESPIRATION RATE: 16 BRPM | OXYGEN SATURATION: 98 %

## 2020-07-14 DIAGNOSIS — Z00.00 ANNUAL PHYSICAL EXAM: ICD-10-CM

## 2020-07-14 DIAGNOSIS — K21.9 GASTROESOPHAGEAL REFLUX DISEASE WITHOUT ESOPHAGITIS: Chronic | ICD-10-CM

## 2020-07-14 DIAGNOSIS — Z00.00 ANNUAL PHYSICAL EXAM: Primary | ICD-10-CM

## 2020-07-14 DIAGNOSIS — E78.5 HYPERLIPIDEMIA ASSOCIATED WITH TYPE 2 DIABETES MELLITUS: ICD-10-CM

## 2020-07-14 DIAGNOSIS — E11.69 HYPERLIPIDEMIA ASSOCIATED WITH TYPE 2 DIABETES MELLITUS: ICD-10-CM

## 2020-07-14 DIAGNOSIS — Z79.4 TYPE 2 DIABETES MELLITUS WITH DIABETIC POLYNEUROPATHY, WITH LONG-TERM CURRENT USE OF INSULIN: ICD-10-CM

## 2020-07-14 DIAGNOSIS — E11.59 HYPERTENSION ASSOCIATED WITH DIABETES: ICD-10-CM

## 2020-07-14 DIAGNOSIS — I15.2 HYPERTENSION ASSOCIATED WITH DIABETES: ICD-10-CM

## 2020-07-14 DIAGNOSIS — E11.42 TYPE 2 DIABETES MELLITUS WITH DIABETIC POLYNEUROPATHY, WITH LONG-TERM CURRENT USE OF INSULIN: ICD-10-CM

## 2020-07-14 DIAGNOSIS — F41.9 ANXIETY: ICD-10-CM

## 2020-07-14 LAB
ALT SERPL W/O P-5'-P-CCNC: 26 U/L (ref 10–44)
ANION GAP SERPL CALC-SCNC: 10 MMOL/L (ref 8–16)
AST SERPL-CCNC: 25 U/L (ref 10–40)
BASOPHILS # BLD AUTO: 0.03 K/UL (ref 0–0.2)
BASOPHILS NFR BLD: 0.4 % (ref 0–1.9)
BUN SERPL-MCNC: 15 MG/DL (ref 6–20)
CALCIUM SERPL-MCNC: 9.3 MG/DL (ref 8.7–10.5)
CHLORIDE SERPL-SCNC: 99 MMOL/L (ref 95–110)
CHOLEST SERPL-MCNC: 171 MG/DL (ref 120–199)
CHOLEST/HDLC SERPL: 5.9 {RATIO} (ref 2–5)
CO2 SERPL-SCNC: 27 MMOL/L (ref 23–29)
COMPLEXED PSA SERPL-MCNC: 0.17 NG/ML (ref 0–4)
CREAT SERPL-MCNC: 1.3 MG/DL (ref 0.5–1.4)
DIFFERENTIAL METHOD: ABNORMAL
EOSINOPHIL # BLD AUTO: 0.2 K/UL (ref 0–0.5)
EOSINOPHIL NFR BLD: 2.1 % (ref 0–8)
ERYTHROCYTE [DISTWIDTH] IN BLOOD BY AUTOMATED COUNT: 13.4 % (ref 11.5–14.5)
EST. GFR  (AFRICAN AMERICAN): >60 ML/MIN/1.73 M^2
EST. GFR  (NON AFRICAN AMERICAN): >60 ML/MIN/1.73 M^2
ESTIMATED AVG GLUCOSE: 243 MG/DL (ref 68–131)
GLUCOSE SERPL-MCNC: 191 MG/DL (ref 70–110)
HBA1C MFR BLD HPLC: 10.1 % (ref 4–5.6)
HCT VFR BLD AUTO: 38.3 % (ref 40–54)
HDLC SERPL-MCNC: 29 MG/DL (ref 40–75)
HDLC SERPL: 17 % (ref 20–50)
HGB BLD-MCNC: 12.3 G/DL (ref 14–18)
IMM GRANULOCYTES # BLD AUTO: 0.03 K/UL (ref 0–0.04)
IMM GRANULOCYTES NFR BLD AUTO: 0.4 % (ref 0–0.5)
LDLC SERPL CALC-MCNC: ABNORMAL MG/DL (ref 63–159)
LYMPHOCYTES # BLD AUTO: 3.1 K/UL (ref 1–4.8)
LYMPHOCYTES NFR BLD: 40.6 % (ref 18–48)
MCH RBC QN AUTO: 28 PG (ref 27–31)
MCHC RBC AUTO-ENTMCNC: 32.1 G/DL (ref 32–36)
MCV RBC AUTO: 87 FL (ref 82–98)
MONOCYTES # BLD AUTO: 0.5 K/UL (ref 0.3–1)
MONOCYTES NFR BLD: 6.7 % (ref 4–15)
NEUTROPHILS # BLD AUTO: 3.9 K/UL (ref 1.8–7.7)
NEUTROPHILS NFR BLD: 49.8 % (ref 38–73)
NONHDLC SERPL-MCNC: 142 MG/DL
NRBC BLD-RTO: 0 /100 WBC
PLATELET # BLD AUTO: 292 K/UL (ref 150–350)
PMV BLD AUTO: 11.5 FL (ref 9.2–12.9)
POTASSIUM SERPL-SCNC: 3.6 MMOL/L (ref 3.5–5.1)
RBC # BLD AUTO: 4.39 M/UL (ref 4.6–6.2)
SODIUM SERPL-SCNC: 136 MMOL/L (ref 136–145)
TRIGL SERPL-MCNC: 677 MG/DL (ref 30–150)
TSH SERPL DL<=0.005 MIU/L-ACNC: 1.08 UIU/ML (ref 0.4–4)
WBC # BLD AUTO: 7.73 K/UL (ref 3.9–12.7)

## 2020-07-14 PROCEDURE — 84450 TRANSFERASE (AST) (SGOT): CPT

## 2020-07-14 PROCEDURE — 99396 PR PREVENTIVE VISIT,EST,40-64: ICD-10-PCS | Mod: 25,S$GLB,, | Performed by: INTERNAL MEDICINE

## 2020-07-14 PROCEDURE — 90471 IMMUNIZATION ADMIN: CPT | Mod: S$GLB,,, | Performed by: INTERNAL MEDICINE

## 2020-07-14 PROCEDURE — 99999 PR PBB SHADOW E&M-EST. PATIENT-LVL V: CPT | Mod: PBBFAC,,, | Performed by: INTERNAL MEDICINE

## 2020-07-14 PROCEDURE — 99999 PR PBB SHADOW E&M-EST. PATIENT-LVL V: ICD-10-PCS | Mod: PBBFAC,,, | Performed by: INTERNAL MEDICINE

## 2020-07-14 PROCEDURE — 85025 COMPLETE CBC W/AUTO DIFF WBC: CPT

## 2020-07-14 PROCEDURE — 84153 ASSAY OF PSA TOTAL: CPT

## 2020-07-14 PROCEDURE — 36415 COLL VENOUS BLD VENIPUNCTURE: CPT | Mod: PO

## 2020-07-14 PROCEDURE — 90471 PNEUMOCOCCAL CONJUGATE VACCINE 13-VALENT LESS THAN 5YO & GREATER THAN: ICD-10-PCS | Mod: S$GLB,,, | Performed by: INTERNAL MEDICINE

## 2020-07-14 PROCEDURE — 84443 ASSAY THYROID STIM HORMONE: CPT

## 2020-07-14 PROCEDURE — 83036 HEMOGLOBIN GLYCOSYLATED A1C: CPT

## 2020-07-14 PROCEDURE — 80048 BASIC METABOLIC PNL TOTAL CA: CPT

## 2020-07-14 PROCEDURE — 90670 PCV13 VACCINE IM: CPT | Mod: S$GLB,,, | Performed by: INTERNAL MEDICINE

## 2020-07-14 PROCEDURE — 84460 ALANINE AMINO (ALT) (SGPT): CPT

## 2020-07-14 PROCEDURE — 99396 PREV VISIT EST AGE 40-64: CPT | Mod: 25,S$GLB,, | Performed by: INTERNAL MEDICINE

## 2020-07-14 PROCEDURE — 80061 LIPID PANEL: CPT

## 2020-07-14 PROCEDURE — 90670 PNEUMOCOCCAL CONJUGATE VACCINE 13-VALENT LESS THAN 5YO & GREATER THAN: ICD-10-PCS | Mod: S$GLB,,, | Performed by: INTERNAL MEDICINE

## 2020-07-14 NOTE — PROGRESS NOTES
Subjective:       Patient ID: Lopez Hicks is a 50 y.o. male.    Chief Complaint: Annual Exam    HPI   50 y.o. Male here for annual exam.     Vaccines: Influenza (2019); Tetanus (2018); Prevnar (2020)  Eye exam: pt will schedule  Colonoscopy: needs    Exercise: cuts grass  Diet: regular    Past Medical History:  No date: Diabetes mellitus, type 2  No date: GERD (gastroesophageal reflux disease)  No date: Hyperlipidemia  No date: Hyperthyroidism  Past Surgical History:  No date: CHOLECYSTECTOMY  No date: LUNG LOBECTOMY; Right      Comment:  after severe pneumonia  No date: ROTATOR CUFF REPAIR; Bilateral      Comment:  Dr. JERRY Cardona, and Dr. Jung.  Social History    Socioeconomic History      Marital status:       Spouse name: Not on file      Number of children: 3      Years of education: Not on file      Highest education level: Not on file    Occupational History      Occupation:     Social Needs      Financial resource strain: Not on file      Food insecurity        Worry: Not on file        Inability: Not on file      Transportation needs        Medical: Not on file        Non-medical: Not on file    Tobacco Use      Smoking status: Never Smoker      Smokeless tobacco: Never Used    Substance and Sexual Activity      Alcohol use: Yes        Alcohol/week: 0.0 standard drinks        Comment: beer once every 3 months      Drug use: No      Sexual activity: Yes        Partners: Female    Lifestyle      Physical activity        Days per week: Not on file        Minutes per session: Not on file      Stress: Not on file    Relationships      Social connections        Talks on phone: Not on file        Gets together: Not on file        Attends [employer] service: Not on file        Active member of club or organization: Not on file        Attends meetings of clubs or organizations: Not on file        Relationship status: Not on file    Other Topics      Concerns:        Not on file    Social  History Narrative       and lives with wife and twin daughters.  Works at [employer].  Enjoys hunting and fishing.  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     Review of patient's allergies indicates:   -- Lisinopril     --  Muscle aches and joint stiffness  Jose Hicks had no medications administered during this visit.    Review of Systems   Constitutional: Negative for activity change, appetite change, chills, diaphoresis, fatigue, fever and unexpected weight change.   HENT: Negative for nasal congestion, mouth sores, postnasal drip, rhinorrhea, sinus pressure/congestion, sneezing, sore throat, trouble swallowing and voice change.    Eyes: Negative for discharge, itching and visual disturbance.   Respiratory: Negative for cough, chest tightness, shortness of breath and wheezing.    Cardiovascular: Negative for chest pain, palpitations and leg swelling.   Gastrointestinal: Negative for abdominal pain, blood in stool, constipation, diarrhea, nausea and vomiting.   Endocrine: Negative for cold intolerance and heat intolerance.   Genitourinary: Negative for difficulty urinating, dysuria, flank pain, hematuria and urgency.   Musculoskeletal: Negative for arthralgias, back pain, myalgias and neck pain.   Integumentary:  Negative for rash and wound.   Allergic/Immunologic: Negative for environmental allergies and food allergies.   Neurological: Negative for dizziness, tremors, seizures, syncope, weakness and headaches.   Hematological: Negative for adenopathy. Does not bruise/bleed easily.   Psychiatric/Behavioral: Negative for confusion, self-injury, sleep disturbance and suicidal ideas. The patient is nervous/anxious.          Objective:      Physical Exam  Constitutional:       General: He is not in acute distress.     Appearance: He is well-developed. He is not diaphoretic.   HENT:      Head: Normocephalic and atraumatic.      Right Ear: External ear normal.      Left Ear: External  ear normal.      Nose: Nose normal.      Mouth/Throat:      Pharynx: No oropharyngeal exudate.   Eyes:      General: No scleral icterus.        Right eye: No discharge.         Left eye: No discharge.      Conjunctiva/sclera: Conjunctivae normal.      Pupils: Pupils are equal, round, and reactive to light.   Neck:      Musculoskeletal: Normal range of motion and neck supple.      Thyroid: No thyromegaly.      Vascular: No JVD.   Cardiovascular:      Rate and Rhythm: Normal rate and regular rhythm.      Pulses:           Dorsalis pedis pulses are 2+ on the right side and 2+ on the left side.        Posterior tibial pulses are 2+ on the right side and 2+ on the left side.      Heart sounds: Normal heart sounds. No murmur.   Pulmonary:      Effort: Pulmonary effort is normal. No respiratory distress.      Breath sounds: Normal breath sounds. No wheezing or rales.   Abdominal:      General: Bowel sounds are normal. There is no distension.      Palpations: Abdomen is soft.      Tenderness: There is no abdominal tenderness. There is no guarding.   Feet:      Right foot:      Protective Sensation: 4 sites tested. 4 sites sensed.      Skin integrity: No ulcer, blister or skin breakdown.      Left foot:      Protective Sensation: 4 sites tested. 4 sites sensed.      Skin integrity: No ulcer, blister or skin breakdown.   Lymphadenopathy:      Cervical: No cervical adenopathy.   Skin:     General: Skin is warm and dry.      Coloration: Skin is not pale.      Findings: No rash.   Neurological:      Mental Status: He is alert and oriented to person, place, and time.   Psychiatric:         Judgment: Judgment normal.         Assessment:       1. Annual physical exam    2. Type 2 diabetes mellitus with diabetic polyneuropathy, with long-term current use of insulin    3. Hypertension associated with diabetes    4. Hyperlipidemia associated with type 2 diabetes mellitus    5. Gastroesophageal reflux disease without esophagitis    6.  Anxiety        Plan:    1. Blood work ordered       Vaccines: Influenza (2019); Tetanus (2018); Prevnar (2020)       Eye exam: pt will schedule       Colonoscopy: needs   2. T2DM- last A1C of 8.4(12/19)<--7.4(7/19)       Followed by Endocrine   3. HTN- stable on Atenolol/Norvasc/HCTZ/Losartan   4. HLD- continue Pravastatin   5. GERD- stable on Prilosec   6. Anxiety- stable on Wellbutrin   7. F/u in 6 months

## 2020-07-28 ENCOUNTER — PATIENT OUTREACH (OUTPATIENT)
Dept: ADMINISTRATIVE | Facility: OTHER | Age: 50
End: 2020-07-28

## 2020-07-28 NOTE — LETTER
AUTHORIZATION FOR RELEASE OF   CONFIDENTIAL INFORMATION    Dear *Dr Martinez    We are seeing Lopez Hicks, date of birth 1970, in the clinic at [facility]. Sukumar Mackey DO is the patient's PCP. Lopez Hicks has an outstanding lab/procedure at the time we reviewed his chart. In order to help keep his health information updated, he has authorized us to request the following medical record(s):        (  )  MAMMOGRAM                                      (  )  COLONOSCOPY      (  )  PAP SMEAR                                          (  )  OUTSIDE LAB RESULTS     (  )  DEXA SCAN                                          ( x )  DIABETIC EYE EXAM            (  )  FOOT EXAM                                          (  )  ENTIRE RECORD     (  )  OUTSIDE IMMUNIZATIONS                 (  )  _______________         Please fax records to [facility], Brian M Helmstetter, DO, FAX # [phone]  Any questions please contact Wilma Horowitz at [phone]        Patient Name: Lopez Hicks  : 1970    Patient Phone #: [phone]

## 2020-07-28 NOTE — PROGRESS NOTES
Requested updates within Care Everywhere.  Patient's chart was reviewed for overdue CHASE topics.  Immunizations reconciled.

## 2020-07-29 ENCOUNTER — OFFICE VISIT (OUTPATIENT)
Dept: ENDOCRINOLOGY | Facility: CLINIC | Age: 50
End: 2020-07-29
Payer: COMMERCIAL

## 2020-07-29 VITALS
DIASTOLIC BLOOD PRESSURE: 88 MMHG | HEART RATE: 78 BPM | SYSTOLIC BLOOD PRESSURE: 144 MMHG | WEIGHT: 273.81 LBS | BODY MASS INDEX: 33.33 KG/M2 | TEMPERATURE: 98 F

## 2020-07-29 DIAGNOSIS — E78.1 HYPERTRIGLYCERIDEMIA: ICD-10-CM

## 2020-07-29 DIAGNOSIS — E11.42 TYPE 2 DIABETES MELLITUS WITH DIABETIC POLYNEUROPATHY, WITH LONG-TERM CURRENT USE OF INSULIN: Primary | ICD-10-CM

## 2020-07-29 DIAGNOSIS — Z79.4 TYPE 2 DIABETES MELLITUS WITH DIABETIC POLYNEUROPATHY, WITH LONG-TERM CURRENT USE OF INSULIN: Primary | ICD-10-CM

## 2020-07-29 DIAGNOSIS — I10 ESSENTIAL HYPERTENSION: ICD-10-CM

## 2020-07-29 PROCEDURE — 99999 PR PBB SHADOW E&M-EST. PATIENT-LVL IV: ICD-10-PCS | Mod: PBBFAC,,, | Performed by: NURSE PRACTITIONER

## 2020-07-29 PROCEDURE — 99999 PR PBB SHADOW E&M-EST. PATIENT-LVL IV: CPT | Mod: PBBFAC,,, | Performed by: NURSE PRACTITIONER

## 2020-07-29 PROCEDURE — 99214 OFFICE O/P EST MOD 30 MIN: CPT | Mod: S$GLB,,, | Performed by: NURSE PRACTITIONER

## 2020-07-29 PROCEDURE — 99214 PR OFFICE/OUTPT VISIT, EST, LEVL IV, 30-39 MIN: ICD-10-PCS | Mod: S$GLB,,, | Performed by: NURSE PRACTITIONER

## 2020-07-29 RX ORDER — INSULIN DEGLUDEC 200 U/ML
INJECTION, SOLUTION SUBCUTANEOUS
Qty: 3 SYRINGE | Refills: 2 | Status: SHIPPED | OUTPATIENT
Start: 2020-07-29 | End: 2020-11-30 | Stop reason: SDUPTHER

## 2020-07-29 RX ORDER — INSULIN LISPRO 100 [IU]/ML
INJECTION, SOLUTION INTRAVENOUS; SUBCUTANEOUS
Qty: 2 BOX | Refills: 2 | Status: SHIPPED | OUTPATIENT
Start: 2020-07-29 | End: 2020-11-30 | Stop reason: SDUPTHER

## 2020-07-29 RX ORDER — SEMAGLUTIDE 1.34 MG/ML
INJECTION, SOLUTION SUBCUTANEOUS
Qty: 2 SYRINGE | Refills: 2 | Status: SHIPPED | OUTPATIENT
Start: 2020-07-29 | End: 2021-02-01

## 2020-07-29 NOTE — PROGRESS NOTES
CC: This 50 y.o. White male  is here for evaluation of  T2DM along with comorbidities indicated in the Visit Diagnosis section of this encounter.    HPI: Lopez Hicks was diagnosed with T2DM in early 20s, diagnosed upon routine labs.       Prior visit 7/2019  a1c down from 10.1 to 7.4%. Triglycerides down from 656 to 307. He started eating better.  He did increase Tresiba from 100 to 120 units/day about 2 months ago.   He's been on vitamin D OTC for about 2 months now. Vitamin D level up to 32 now.  Plan It is surprising that he is not experiencing hypoglycemia after taking large doses of Humalog after very low carb meals. Advised him to have a balanced diet and he should consume at least 15-30 grams of CHO/meal.   Would like to wean patient off Humalog -   You can take Tresiba 120 units ONCE daily at night. Start by taking 60 units tonight, skip tomorrow morning's dose, and then inject 120 units every night starting tomorrow night.   Stop Janumet.   Start metformin XR 1000 mg twice daily (=2 tablets with breakfast and 2 tablets with dinner).   Start Trulicity 0.75 mg once weekly for 4 weeks and lower Humalog from 40 to 20 units before meals. Then increase to Trulicity 1.5 mg once weekly and you should be able to stop Humalog at this point.   Monitor blood sugars before meals and bedtime.   Monitor blood sugar every 8 hours in order to get a continuous glucose reading from the Freestyle Kayleigh.   Return to clinic in 6 weeks with blood sugar logs or with the Kayleigh reader if you're using it.       Interval history  He has been lost to care for more than a year. He's been busy with work.   He has not been taking Humalog for almost a year now.   His A1c easton from 7.4% in 7/2019 to 8.4% in 12/2019, and now up to 10.1%. He is surprised that his A1c is so high. He has quite eating a lot of carbs like rice and pasta.     He did try using the Freestyle Kayleigh but he could not afford them and he kept knocking it off.          LAST DIABETES EDUCATION: missed appt - doesn't want to reschedule.     HOSPITALIZED FOR DIABETES -  No.     DIABETES MEDICATIONS: Trulicity 1.5 mg once weekly, Tresiba 60 units bid, metformin XR 1000 mg bid      Misses medication doses - no     DM COMPLICATIONS: peripheral neuropathy    SIGNIFICANT DIABETES MED HISTORY: denies intolerance to prior DM meds. Cites that Bydureon was ineffective; was dc'd in 8/2018    SELF MONITORING BLOOD GLUCOSE: Checks blood glucose at home very infrequently    HYPOGLYCEMIC EPISODES: denies      CURRENT DIET: drinks diet coke and unsweet tea. Eats 2-3 meals/day, sometimes skips breakfast. Usually eats breakfast when he's working.      Diet recall: dinner was boiled shrimp, 1 potato, onion, olives, mushrooms. Lunch was hamburger oli. Later had a small bag of chips.     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 No acute distress.   PSYCH: AAOx3, appropriate mood and affect, conversant, well-groomed. Judgement and insight good.   NEURO: Cranial nerves grossly intact. Speech clear, no tremor.   CHEST: Respirations even and unlabored.   MS: Gait steady. No clubbing.         Hemoglobin A1C   Date Value Ref Range Status   07/14/2020 10.1 (H) 4.0 - 5.6 % Final     Comment:     ADA Screening Guidelines:  5.7-6.4%  Consistent with prediabetes  >or=6.5%  Consistent with diabetes  High levels of fetal hemoglobin interfere with the HbA1C  assay. Heterozygous hemoglobin variants (HbS, HgC,  etc)do  not significantly interfere with this assay.   However, presence of multiple variants may affect accuracy.     12/10/2019 8.4 (H) 4.0 - 5.6 % Final     Comment:     ADA Screening Guidelines:  5.7-6.4%  Consistent with prediabetes  >or=6.5%  Consistent with diabetes  High levels of fetal hemoglobin interfere with the HbA1C  assay. Heterozygous hemoglobin variants (HbS, HgC, etc)do  not significantly interfere with this assay.   However, presence of multiple variants may affect accuracy.     07/08/2019 7.4 (H) 4.0 - 5.6 % Final     Comment:     ADA Screening Guidelines:  5.7-6.4%  Consistent with prediabetes  >or=6.5%  Consistent with diabetes  High levels of fetal hemoglobin interfere with the HbA1C  assay. Heterozygous hemoglobin variants (HbS, HgC, etc)do  not significantly interfere with this assay.   However, presence of multiple variants may affect accuracy.             Chemistry        Component Value Date/Time     07/14/2020 0915    K 3.6 07/14/2020 0915    CL 99 07/14/2020 0915    CO2 27 07/14/2020 0915    BUN 15 07/14/2020 0915    CREATININE 1.3 07/14/2020 0915     (H) 07/14/2020 0915        Component Value Date/Time    CALCIUM 9.3 07/14/2020 0915    ALKPHOS 56 01/23/2019 0850    AST 25 07/14/2020 0915    ALT 26 07/14/2020 0915    BILITOT 0.4 01/23/2019 0850    ESTGFRAFRICA >60.0 07/14/2020 0915    EGFRNONAA >60.0 07/14/2020 0915          Lab Results   Component Value Date    LDLCALC Invalid, Trig>400.0 07/14/2020          Ref. Range 7/14/2020 09:15   Cholesterol Latest Ref Range: 120 - 199 mg/dL 171   HDL Latest Ref Range: 40 - 75 mg/dL 29 (L)   Hdl/Cholesterol Ratio Latest Ref Range: 20.0 - 50.0 % 17.0 (L)   LDL Cholesterol External Latest Ref Range: 63.0 - 159.0 mg/dL Invalid, Trig>400.0   Non-HDL Cholesterol Latest Units: mg/dL 142   Total Cholesterol/HDL Ratio Latest Ref Range: 2.0 - 5.0  5.9 (H)   Triglycerides Latest Ref Range: 30 - 150 mg/dL 677 (H)       Lab Results   Component  Value Date    MICALBCREAT 29.0 07/14/2020       STANDARDS of CARE:        ASA:               Last eye exam:       ASSESSMENT and PLAN:    A1C GOAL: < 7 %       1. Type 2 diabetes mellitus with diabetic polyneuropathy, with long-term current use of insulin  Restart Humalog at 20 units before each meal.   Continue metformin, Tresiba, and Trulicity.   Monitor glucose before each meal and bedtime, 4x/day.   Undergo  Continuous Glucose Monitoring (CGM) study.   Return to clinic for CGM study.       insulin lispro (HUMALOG KWIKPEN INSULIN) 100 unit/mL pen    insulin degludec (TRESIBA FLEXTOUCH U-200) 200 unit/mL (3 mL) InPn    semaglutide (OZEMPIC) 1 mg/dose (2 mg/1.5 mL) PnIj    GLUCOSE MONITORING CONTINUOUS MIN 72 HOURS   2. Essential hypertension  Continue current tx   3. Hypertriglyceridemia  Improve glycemic control.          Orders Placed This Encounter   Procedures    GLUCOSE MONITORING CONTINUOUS MIN 72 HOURS     Standing Status:   Future     Standing Expiration Date:   7/30/2021        No follow-ups on file.

## 2020-07-29 NOTE — PATIENT INSTRUCTIONS
Restart Humalog at 20 units before each meal.   Continue metformin, Tresiba, and Trulicity.   Monitor glucose before each meal and bedtime, 4x/day.   Undergo  Continuous Glucose Monitoring (CGM) study.   Return to clinic for CGM study.

## 2020-08-06 ENCOUNTER — TELEPHONE (OUTPATIENT)
Dept: ENDOCRINOLOGY | Facility: CLINIC | Age: 50
End: 2020-08-06

## 2020-08-06 NOTE — TELEPHONE ENCOUNTER
----- Message from Marija peteyrahulmaria d sent at 8/6/2020 10:13 AM CDT -----  Type: Patient Call Back    Who called: pt     What is the request in detail: pt states he was instructed to make appt with Ms. Summers to get device implanted in his arm to test blood sugar. Please contact regarding.     Can the clinic reply by [facility]?    Would the patient rather a call back or a response via [facility]? Call back     Best call back number: [phone]    Additional Information:

## 2020-08-06 NOTE — TELEPHONE ENCOUNTER
Spoke with the pt and informed him that his Kayleigh visit would be with Ms. Francesca [facility]. I gave the pt the phone number to call to be scheduled. He understood.

## 2020-08-19 ENCOUNTER — TELEPHONE (OUTPATIENT)
Dept: ENDOCRINOLOGY | Facility: CLINIC | Age: 50
End: 2020-08-19

## 2020-08-19 NOTE — TELEPHONE ENCOUNTER
----- Message from Mellisa Summers NP sent at 8/19/2020  8:02 AM CDT -----  Please call pt to schedule CGM study. Order previously placed and he was supposed to call back to schedule.

## 2020-08-19 NOTE — TELEPHONE ENCOUNTER
Tried contacting the pt to schedule his CGM study appointments. The pt did not answer LVM to return call.

## 2020-09-06 ENCOUNTER — OFFICE VISIT (OUTPATIENT)
Dept: URGENT CARE | Facility: CLINIC | Age: 50
End: 2020-09-06
Payer: COMMERCIAL

## 2020-09-06 VITALS
BODY MASS INDEX: 33.24 KG/M2 | RESPIRATION RATE: 16 BRPM | WEIGHT: 273 LBS | SYSTOLIC BLOOD PRESSURE: 143 MMHG | DIASTOLIC BLOOD PRESSURE: 84 MMHG | TEMPERATURE: 98 F | HEART RATE: 83 BPM | HEIGHT: 76 IN | OXYGEN SATURATION: 98 %

## 2020-09-06 DIAGNOSIS — J02.9 PHARYNGITIS WITH VIRAL SYNDROME: Primary | ICD-10-CM

## 2020-09-06 DIAGNOSIS — B34.9 PHARYNGITIS WITH VIRAL SYNDROME: Primary | ICD-10-CM

## 2020-09-06 LAB
CTP QC/QA: YES
CTP QC/QA: YES
MOLECULAR STREP A: NEGATIVE
SARS-COV-2 RDRP RESP QL NAA+PROBE: NEGATIVE

## 2020-09-06 PROCEDURE — U0002 COVID-19 LAB TEST NON-CDC: HCPCS | Mod: S$GLB,,, | Performed by: NURSE PRACTITIONER

## 2020-09-06 PROCEDURE — 87651 POCT STREP A MOLECULAR: ICD-10-PCS | Mod: QW,S$GLB,, | Performed by: NURSE PRACTITIONER

## 2020-09-06 PROCEDURE — 87651 STREP A DNA AMP PROBE: CPT | Mod: QW,S$GLB,, | Performed by: NURSE PRACTITIONER

## 2020-09-06 PROCEDURE — U0002: ICD-10-PCS | Mod: S$GLB,,, | Performed by: NURSE PRACTITIONER

## 2020-09-06 PROCEDURE — 99214 OFFICE O/P EST MOD 30 MIN: CPT | Mod: S$GLB,,, | Performed by: NURSE PRACTITIONER

## 2020-09-06 PROCEDURE — 99214 PR OFFICE/OUTPT VISIT, EST, LEVL IV, 30-39 MIN: ICD-10-PCS | Mod: S$GLB,,, | Performed by: NURSE PRACTITIONER

## 2020-09-06 RX ORDER — PROMETHAZINE HYDROCHLORIDE AND DEXTROMETHORPHAN HYDROBROMIDE 6.25; 15 MG/5ML; MG/5ML
5 SYRUP ORAL NIGHTLY PRN
Qty: 118 ML | Refills: 0 | Status: SHIPPED | OUTPATIENT
Start: 2020-09-06 | End: 2020-09-16

## 2020-09-06 NOTE — PROGRESS NOTES
"Subjective:       Patient ID: Lopez Hicks is a 50 y.o. male.    Vitals:  height is 6' 4" (1.93 m) and weight is 123.8 kg (273 lb). His temporal temperature is 97.9 °F (36.6 °C). His blood pressure is 143/84 (abnormal) and his pulse is 83. His respiration is 16 and oxygen saturation is 98%.     Chief Complaint: Sinus Problem    Sinus Problem  This is a new problem. The current episode started yesterday. The problem has been gradually worsening since onset. There has been no fever. His pain is at a severity of 2/10. The pain is mild. Associated symptoms include congestion, coughing, sneezing and a sore throat. Pertinent negatives include no chills, diaphoresis, ear pain, headaches, hoarse voice, neck pain, shortness of breath, sinus pressure or swollen glands. Past treatments include acetaminophen. The treatment provided mild relief.       Constitution: Negative for chills, sweating and fever.   HENT: Positive for congestion and sore throat. Negative for ear pain, sinus pain and sinus pressure.    Neck: Negative for neck pain.   Respiratory: Positive for cough and sputum production. Negative for shortness of breath and wheezing.    Gastrointestinal: Negative for abdominal pain, nausea, vomiting and diarrhea.   Genitourinary: Negative for dysuria.   Skin: Negative for rash.   Allergic/Immunologic: Positive for sneezing.   Neurological: Negative for headaches.       Objective:      Physical Exam   Constitutional: He is oriented to person, place, and time. He appears well-developed. He is cooperative.  Non-toxic appearance. He does not appear ill. No distress.   HENT:   Head: Normocephalic and atraumatic.   Ears:   Right Ear: Hearing, tympanic membrane, external ear and ear canal normal.   Left Ear: Hearing, tympanic membrane, external ear and ear canal normal.   Nose: Mucosal edema, rhinorrhea and congestion present. No purulent discharge or nasal deformity. No epistaxis. Right sinus exhibits no maxillary sinus " tenderness and no frontal sinus tenderness. Left sinus exhibits no maxillary sinus tenderness and no frontal sinus tenderness.   Mouth/Throat: Uvula is midline and mucous membranes are normal. No trismus in the jaw. Normal dentition. No uvula swelling. Posterior oropharyngeal erythema present. No oropharyngeal exudate or posterior oropharyngeal edema.   Eyes: Conjunctivae and lids are normal. No scleral icterus.   Neck: Trachea normal, full passive range of motion without pain and phonation normal. Neck supple. No neck rigidity. No edema and no erythema present.   Cardiovascular: Normal rate, regular rhythm, normal heart sounds and normal pulses.   Pulmonary/Chest: Effort normal and breath sounds normal. No respiratory distress. He has no decreased breath sounds. He has no rhonchi.   Abdominal: Normal appearance.   Musculoskeletal: Normal range of motion.         General: No deformity.   Lymphadenopathy:     He has no cervical adenopathy.        Right cervical: No superficial cervical, no deep cervical and no posterior cervical adenopathy present.       Left cervical: No superficial cervical, no deep cervical and no posterior cervical adenopathy present.   Neurological: He is alert and oriented to person, place, and time. He exhibits normal muscle tone. Coordination normal.   Skin: Skin is warm, dry, intact, not diaphoretic and not pale. Psychiatric: His speech is normal and behavior is normal. Judgment and thought content normal.   Nursing note and vitals reviewed.        Assessment:       1. Pharyngitis with viral syndrome        Plan:       Results for orders placed or performed in visit on 09/06/20   POCT COVID-19 Rapid Screening   Result Value Ref Range    POC Rapid COVID Negative Negative     Acceptable Yes    POCT Strep A, Molecular   Result Value Ref Range    Molecular Strep A, POC Negative Negative     Acceptable Yes      Pharyngitis with viral syndrome  -     POCT COVID-19  "Rapid Screening  -     POCT Strep A, Molecular  -     promethazine-dextromethorphan (PROMETHAZINE-DM) 6.25-15 mg/5 mL Syrp; Take 5 mLs by mouth nightly as needed.  Dispense: 118 mL; Refill: 0      Patient Instructions   Please follow up with your Primary care provider within 2-5 days if your signs and symptoms have not resolved or worsen.  The usual course of cold symptoms are 10-14 days.     If your condition worsens or fails to improve we recommend that you receive another evaluation at the emergency room immediately or contact your primary medical clinic to discuss your concerns.     You must understand that you have received an Urgent Care treatment only and that you may be released before all of your medical problems are known or treated.   You, the patient, will arrange for follow up care as instructed.     Tylenol or Ibuprofen can also be used as directed for pain/fever unless you have an allergy to them or medical condition such as stomach ulcers, kidney or liver disease or blood thinners etc for which you should not be taking these type of medications.     Take over the counter cough medication as directed as needed for cough.  You should avoid medications with pseudoephedrine or phenylephrine (any medication with "D") if you have high blood pressure as this can cause an elevation in your blood pressure. Instead consider Corcidin HBP as needed to prevent an elevated blood pressure.     Natural remedies of symptoms (as needed) include humidification, saline nasal sprays, and/or steamy showers.  Increase fluids, warm tea with honey, cough drops as needed.  You may also use salt water gargles for sore throat.    IF you received a oral steroid today - As discussed, this can elevate your blood pressure, elevate your blood sugar, water weight gain, nervous energy, redness to the face and dimpling of the skin at the injection site.       Viral Syndrome (Adult)  A viral illness may cause a number of symptoms. The " "symptoms depend on the part of the body that the virus affects. If it settles in your nose, throat, and lungs, it may cause cough, sore throat, congestion, and sometimes headache. If it settles in your stomach and intestinal tract, it may cause vomiting and diarrhea. Sometimes it causes vague symptoms like "aching all over," feeling tired, loss of appetite, or fever.  A viral illness usually lasts 1 to 2 weeks, but sometimes it lasts longer. In some cases, a more serious infection can look like a viral syndrome in the first few days of the illness. You may need another exam and additional tests to know the difference. Watch for the warning signs listed below.  Home care  Follow these guidelines for taking care of yourself at home:  · If symptoms are severe, rest at home for the first 2 to 3 days.  · Stay away from cigarette smoke - both your smoke and the smoke from others.  · You may use over-the-counter acetaminophen or ibuprofen for fever, muscle aching, and headache, unless another medicine was prescribed for this. If you have chronic liver or kidney disease or ever had a stomach ulcer or GI bleeding, talk with your doctor before using these medicines. No one who is younger than 18 and ill with a fever should take aspirin. It may cause severe disease or death.  · Your appetite may be poor, so a light diet is fine. Avoid dehydration by drinking 8 to 12 8-ounce glasses of fluids each day. This may include water; orange juice; lemonade; apple, grape, and cranberry juice; clear fruit drinks; electrolyte replacement and sports drinks; and decaffeinated teas and coffee. If you have been diagnosed with a kidney disease, ask your doctor how much and what types of fluids you should drink to prevent dehydration. If you have kidney disease, drinking too much fluid can cause it build up in the your body and be dangerous to your health.  · Over-the-counter remedies won't shorten the length of the illness but may be helpful " for cough, sore throat; and nasal and sinus congestion. Don't use decongestants if you have high blood pressure.  Follow-up care  Follow up with your healthcare provider if you do not improve over the next week.  Call 911  Get emergency medical care if any of the following occur:  · Convulsion  · Feeling weak, dizzy, or like you are going to faint  · Chest pain, shortness of breath, wheezing, or difficulty breathing  When to seek medical advice  Call your healthcare provider right away if any of these occur:  · Cough with lots of colored sputum (mucus) or blood in your sputum  · Chest pain, shortness of breath, wheezing, or difficulty breathing  · Severe headache; face, neck, or ear pain  · Severe, constant pain in the lower right side of your belly (abdominal)  · Continued vomiting (cant keep liquids down)  · Frequent diarrhea (more than 5 times a day); blood (red or black color) or mucus in diarrhea  · Feeling weak, dizzy, or like you are going to faint  · Extreme thirst  · Fever of 100.4°F (38°C) or higher, or as directed by your healthcare provider  Date Last Reviewed: 9/25/2015  © 8113-4892 [employer]. [address] [place] [address], [place] [address]. All rights reserved. This information is not intended as a substitute for professional medical care. Always follow your healthcare professional's instructions.

## 2020-09-06 NOTE — PATIENT INSTRUCTIONS
"Please follow up with your Primary care provider within 2-5 days if your signs and symptoms have not resolved or worsen.  The usual course of cold symptoms are 10-14 days.     If your condition worsens or fails to improve we recommend that you receive another evaluation at the emergency room immediately or contact your primary medical clinic to discuss your concerns.     You must understand that you have received an Urgent Care treatment only and that you may be released before all of your medical problems are known or treated.   You, the patient, will arrange for follow up care as instructed.     Tylenol or Ibuprofen can also be used as directed for pain/fever unless you have an allergy to them or medical condition such as stomach ulcers, kidney or liver disease or blood thinners etc for which you should not be taking these type of medications.     Take over the counter cough medication as directed as needed for cough.  You should avoid medications with pseudoephedrine or phenylephrine (any medication with "D") if you have high blood pressure as this can cause an elevation in your blood pressure. Instead consider Corcidin HBP as needed to prevent an elevated blood pressure.     Natural remedies of symptoms (as needed) include humidification, saline nasal sprays, and/or steamy showers.  Increase fluids, warm tea with honey, cough drops as needed.  You may also use salt water gargles for sore throat.    IF you received a oral steroid today - As discussed, this can elevate your blood pressure, elevate your blood sugar, water weight gain, nervous energy, redness to the face and dimpling of the skin at the injection site.       Viral Syndrome (Adult)  A viral illness may cause a number of symptoms. The symptoms depend on the part of the body that the virus affects. If it settles in your nose, throat, and lungs, it may cause cough, sore throat, congestion, and sometimes headache. If it settles in your stomach and " "intestinal tract, it may cause vomiting and diarrhea. Sometimes it causes vague symptoms like "aching all over," feeling tired, loss of appetite, or fever.  A viral illness usually lasts 1 to 2 weeks, but sometimes it lasts longer. In some cases, a more serious infection can look like a viral syndrome in the first few days of the illness. You may need another exam and additional tests to know the difference. Watch for the warning signs listed below.  Home care  Follow these guidelines for taking care of yourself at home:  · If symptoms are severe, rest at home for the first 2 to 3 days.  · Stay away from cigarette smoke - both your smoke and the smoke from others.  · You may use over-the-counter acetaminophen or ibuprofen for fever, muscle aching, and headache, unless another medicine was prescribed for this. If you have chronic liver or kidney disease or ever had a stomach ulcer or GI bleeding, talk with your doctor before using these medicines. No one who is younger than 18 and ill with a fever should take aspirin. It may cause severe disease or death.  · Your appetite may be poor, so a light diet is fine. Avoid dehydration by drinking 8 to 12 8-ounce glasses of fluids each day. This may include water; orange juice; lemonade; apple, grape, and cranberry juice; clear fruit drinks; electrolyte replacement and sports drinks; and decaffeinated teas and coffee. If you have been diagnosed with a kidney disease, ask your doctor how much and what types of fluids you should drink to prevent dehydration. If you have kidney disease, drinking too much fluid can cause it build up in the your body and be dangerous to your health.  · Over-the-counter remedies won't shorten the length of the illness but may be helpful for cough, sore throat; and nasal and sinus congestion. Don't use decongestants if you have high blood pressure.  Follow-up care  Follow up with your healthcare provider if you do not improve over the next " week.  Call 911  Get emergency medical care if any of the following occur:  · Convulsion  · Feeling weak, dizzy, or like you are going to faint  · Chest pain, shortness of breath, wheezing, or difficulty breathing  When to seek medical advice  Call your healthcare provider right away if any of these occur:  · Cough with lots of colored sputum (mucus) or blood in your sputum  · Chest pain, shortness of breath, wheezing, or difficulty breathing  · Severe headache; face, neck, or ear pain  · Severe, constant pain in the lower right side of your belly (abdominal)  · Continued vomiting (cant keep liquids down)  · Frequent diarrhea (more than 5 times a day); blood (red or black color) or mucus in diarrhea  · Feeling weak, dizzy, or like you are going to faint  · Extreme thirst  · Fever of 100.4°F (38°C) or higher, or as directed by your healthcare provider  Date Last Reviewed: 9/25/2015  © 8179-1980 [employer]. [address] [place] [address], [place] [address]. All rights reserved. This information is not intended as a substitute for professional medical care. Always follow your healthcare professional's instructions.

## 2020-10-05 ENCOUNTER — PATIENT MESSAGE (OUTPATIENT)
Dept: ADMINISTRATIVE | Facility: HOSPITAL | Age: 50
End: 2020-10-05

## 2020-11-30 DIAGNOSIS — E11.42 TYPE 2 DIABETES MELLITUS WITH DIABETIC POLYNEUROPATHY, WITH LONG-TERM CURRENT USE OF INSULIN: ICD-10-CM

## 2020-11-30 DIAGNOSIS — Z79.4 TYPE 2 DIABETES MELLITUS WITH DIABETIC POLYNEUROPATHY, WITH LONG-TERM CURRENT USE OF INSULIN: ICD-10-CM

## 2020-11-30 RX ORDER — INSULIN LISPRO 100 [IU]/ML
INJECTION, SOLUTION INTRAVENOUS; SUBCUTANEOUS
Qty: 2 BOX | Refills: 0 | Status: SHIPPED | OUTPATIENT
Start: 2020-11-30 | End: 2020-12-24 | Stop reason: SDUPTHER

## 2020-11-30 RX ORDER — INSULIN DEGLUDEC 200 U/ML
INJECTION, SOLUTION SUBCUTANEOUS
Qty: 6 SYRINGE | Refills: 0 | Status: SHIPPED | OUTPATIENT
Start: 2020-11-30 | End: 2020-12-24 | Stop reason: SDUPTHER

## 2020-11-30 NOTE — TELEPHONE ENCOUNTER
Needs to rtc with labs prior.   CGM study previously ordered at lat visit. Is he still open to the study?

## 2020-11-30 NOTE — TELEPHONE ENCOUNTER
----- Message from Asher Jordan sent at 11/30/2020 10:44 AM CST -----  Can the clinic reply in [facility]:     Please refill the medication(s) listed below. The patient can be reached at this phone number once it is called into the pharmacy.    Medication #1   insulin degludec (TRESIBA FLEXTOUCH U-200) 200 unit/mL (3 mL) InPn    Medication #2   insulin lispro (HUMALOG KWIKPEN INSULIN) 100 unit/mL pen    Preferred Pharmacy:    [id number] - [place] - [id number] [facility]

## 2020-12-24 ENCOUNTER — PATIENT OUTREACH (OUTPATIENT)
Dept: ADMINISTRATIVE | Facility: HOSPITAL | Age: 50
End: 2020-12-24

## 2020-12-24 DIAGNOSIS — Z12.11 SCREENING FOR COLON CANCER: Primary | ICD-10-CM

## 2020-12-24 DIAGNOSIS — E11.42 TYPE 2 DIABETES MELLITUS WITH DIABETIC POLYNEUROPATHY, WITH LONG-TERM CURRENT USE OF INSULIN: ICD-10-CM

## 2020-12-24 DIAGNOSIS — Z79.4 TYPE 2 DIABETES MELLITUS WITH DIABETIC POLYNEUROPATHY, WITH LONG-TERM CURRENT USE OF INSULIN: ICD-10-CM

## 2020-12-24 RX ORDER — INSULIN LISPRO 100 [IU]/ML
INJECTION, SOLUTION INTRAVENOUS; SUBCUTANEOUS
Qty: 2 BOX | Refills: 0 | Status: SHIPPED | OUTPATIENT
Start: 2020-12-24 | End: 2021-02-01 | Stop reason: SDUPTHER

## 2020-12-24 RX ORDER — INSULIN DEGLUDEC 200 U/ML
INJECTION, SOLUTION SUBCUTANEOUS
Qty: 6 SYRINGE | Refills: 0 | Status: SHIPPED | OUTPATIENT
Start: 2020-12-24 | End: 2021-01-22 | Stop reason: SDUPTHER

## 2020-12-24 NOTE — TELEPHONE ENCOUNTER
----- Message from Ishan Espana sent at 12/24/2020 10:30 AM CST -----  Type: RX Refill Request    Who Called: Pt     Have you contacted your pharmacy: Yes     Refill or New Rx: refill     RX Name and Strength: insulin degludec (TRESIBA FLEXTOUCH U-200) 200 unit/mL (3 mL) InPn and insulin lispro (HUMALOG KWIKPEN INSULIN) 100 unit/mL pen    How is the patient currently taking it? (ex. 1XDay):    Is this a 30 day or 90 day RX:    Preferred Pharmacy with phone number:   [id number] - [place], [place] - [address] [address] [facility] [address]  [id number] [address] [facility] [address]  [place] [address]  Phone: [phone] Fax: [phone]      Local or Mail Order: local     Ordering Provider:    Would the patient rather a call back or a response via [facility]?     Best Call Back Number: [phone]     Additional Information: Pt states he is going out of town the day after [place] and he needs this medication refilled

## 2021-01-04 ENCOUNTER — PATIENT MESSAGE (OUTPATIENT)
Dept: ADMINISTRATIVE | Facility: HOSPITAL | Age: 51
End: 2021-01-04

## 2021-01-21 RX ORDER — OMEPRAZOLE 40 MG/1
CAPSULE, DELAYED RELEASE ORAL
Qty: 90 CAPSULE | Refills: 1 | Status: SHIPPED | OUTPATIENT
Start: 2021-01-21 | End: 2021-07-27

## 2021-01-22 DIAGNOSIS — Z79.4 TYPE 2 DIABETES MELLITUS WITH DIABETIC POLYNEUROPATHY, WITH LONG-TERM CURRENT USE OF INSULIN: ICD-10-CM

## 2021-01-22 DIAGNOSIS — E11.42 TYPE 2 DIABETES MELLITUS WITH DIABETIC POLYNEUROPATHY, WITH LONG-TERM CURRENT USE OF INSULIN: ICD-10-CM

## 2021-01-22 RX ORDER — INSULIN DEGLUDEC 200 U/ML
INJECTION, SOLUTION SUBCUTANEOUS
Qty: 6 SYRINGE | Refills: 0 | Status: SHIPPED | OUTPATIENT
Start: 2021-01-22 | End: 2021-02-01 | Stop reason: SDUPTHER

## 2021-02-01 ENCOUNTER — OFFICE VISIT (OUTPATIENT)
Dept: ENDOCRINOLOGY | Facility: CLINIC | Age: 51
End: 2021-02-01
Payer: COMMERCIAL

## 2021-02-01 VITALS
TEMPERATURE: 98 F | HEART RATE: 69 BPM | WEIGHT: 281 LBS | BODY MASS INDEX: 34.2 KG/M2 | SYSTOLIC BLOOD PRESSURE: 158 MMHG | DIASTOLIC BLOOD PRESSURE: 86 MMHG

## 2021-02-01 DIAGNOSIS — Z71.9 VISIT FOR COUNSELING: ICD-10-CM

## 2021-02-01 DIAGNOSIS — I10 ESSENTIAL HYPERTENSION: ICD-10-CM

## 2021-02-01 DIAGNOSIS — E78.1 HYPERTRIGLYCERIDEMIA: ICD-10-CM

## 2021-02-01 DIAGNOSIS — Z79.4 TYPE 2 DIABETES MELLITUS WITH DIABETIC POLYNEUROPATHY, WITH LONG-TERM CURRENT USE OF INSULIN: Primary | ICD-10-CM

## 2021-02-01 DIAGNOSIS — E11.42 TYPE 2 DIABETES MELLITUS WITH DIABETIC POLYNEUROPATHY, WITH LONG-TERM CURRENT USE OF INSULIN: Primary | ICD-10-CM

## 2021-02-01 PROCEDURE — 99999 PR PBB SHADOW E&M-EST. PATIENT-LVL V: CPT | Mod: PBBFAC,,, | Performed by: NURSE PRACTITIONER

## 2021-02-01 PROCEDURE — 99215 OFFICE O/P EST HI 40 MIN: CPT | Mod: 25,S$GLB,, | Performed by: NURSE PRACTITIONER

## 2021-02-01 PROCEDURE — 99999 PR PBB SHADOW E&M-EST. PATIENT-LVL V: ICD-10-PCS | Mod: PBBFAC,,, | Performed by: NURSE PRACTITIONER

## 2021-02-01 PROCEDURE — 90471 IMMUNIZATION ADMIN: CPT | Mod: S$GLB,,, | Performed by: NURSE PRACTITIONER

## 2021-02-01 PROCEDURE — 90471 FLU VACCINE (QUAD) GREATER THAN OR EQUAL TO 3YO PRESERVATIVE FREE IM: ICD-10-PCS | Mod: S$GLB,,, | Performed by: NURSE PRACTITIONER

## 2021-02-01 PROCEDURE — 99215 PR OFFICE/OUTPT VISIT, EST, LEVL V, 40-54 MIN: ICD-10-PCS | Mod: 25,S$GLB,, | Performed by: NURSE PRACTITIONER

## 2021-02-01 PROCEDURE — 90686 FLU VACCINE (QUAD) GREATER THAN OR EQUAL TO 3YO PRESERVATIVE FREE IM: ICD-10-PCS | Mod: S$GLB,,, | Performed by: NURSE PRACTITIONER

## 2021-02-01 PROCEDURE — 90686 IIV4 VACC NO PRSV 0.5 ML IM: CPT | Mod: S$GLB,,, | Performed by: NURSE PRACTITIONER

## 2021-02-01 RX ORDER — DULAGLUTIDE 0.75 MG/.5ML
0.75 INJECTION, SOLUTION SUBCUTANEOUS
Qty: 4 PEN | Refills: 0 | Status: SHIPPED | OUTPATIENT
Start: 2021-02-01 | End: 2021-08-16 | Stop reason: SINTOL

## 2021-02-01 RX ORDER — PEN NEEDLE, DIABETIC 30 GX3/16"
NEEDLE, DISPOSABLE MISCELLANEOUS
Qty: 400 EACH | Refills: 3 | Status: SHIPPED | OUTPATIENT
Start: 2021-02-01 | End: 2023-01-09 | Stop reason: SDUPTHER

## 2021-02-01 RX ORDER — LOSARTAN POTASSIUM 100 MG/1
100 TABLET ORAL DAILY
Qty: 90 TABLET | Refills: 3 | Status: SHIPPED | OUTPATIENT
Start: 2021-02-01 | End: 2021-09-20 | Stop reason: SDUPTHER

## 2021-02-01 RX ORDER — DULAGLUTIDE 1.5 MG/.5ML
1.5 INJECTION, SOLUTION SUBCUTANEOUS
Qty: 4 PEN | Refills: 1 | Status: SHIPPED | OUTPATIENT
Start: 2021-02-01 | End: 2021-08-16 | Stop reason: SINTOL

## 2021-02-01 RX ORDER — INSULIN LISPRO 100 [IU]/ML
INJECTION, SOLUTION INTRAVENOUS; SUBCUTANEOUS
Qty: 6 BOX | Refills: 0 | Status: SHIPPED | OUTPATIENT
Start: 2021-02-01 | End: 2021-03-16

## 2021-02-01 RX ORDER — INSULIN DEGLUDEC 200 U/ML
INJECTION, SOLUTION SUBCUTANEOUS
Qty: 12 SYRINGE | Refills: 0 | Status: SHIPPED | OUTPATIENT
Start: 2021-02-01 | End: 2021-05-21

## 2021-02-24 ENCOUNTER — PATIENT MESSAGE (OUTPATIENT)
Dept: INTERNAL MEDICINE | Facility: CLINIC | Age: 51
End: 2021-02-24

## 2021-03-10 ENCOUNTER — PATIENT OUTREACH (OUTPATIENT)
Dept: ADMINISTRATIVE | Facility: HOSPITAL | Age: 51
End: 2021-03-10

## 2021-03-11 RX ORDER — LOSARTAN POTASSIUM 100 MG/1
100 TABLET ORAL DAILY
Qty: 90 TABLET | Refills: 1 | Status: SHIPPED | OUTPATIENT
Start: 2021-03-11 | End: 2021-09-20 | Stop reason: SDUPTHER

## 2021-03-16 DIAGNOSIS — Z79.4 TYPE 2 DIABETES MELLITUS WITH DIABETIC POLYNEUROPATHY, WITH LONG-TERM CURRENT USE OF INSULIN: ICD-10-CM

## 2021-03-16 DIAGNOSIS — E11.42 TYPE 2 DIABETES MELLITUS WITH DIABETIC POLYNEUROPATHY, WITH LONG-TERM CURRENT USE OF INSULIN: ICD-10-CM

## 2021-03-16 RX ORDER — INSULIN LISPRO 100 [IU]/ML
INJECTION, SOLUTION INTRAVENOUS; SUBCUTANEOUS
Qty: 30 ML | Refills: 1 | Status: SHIPPED | OUTPATIENT
Start: 2021-03-16 | End: 2021-05-21

## 2021-05-10 ENCOUNTER — PATIENT OUTREACH (OUTPATIENT)
Dept: ADMINISTRATIVE | Facility: HOSPITAL | Age: 51
End: 2021-05-10

## 2021-05-10 ENCOUNTER — PATIENT MESSAGE (OUTPATIENT)
Dept: ADMINISTRATIVE | Facility: HOSPITAL | Age: 51
End: 2021-05-10

## 2021-05-21 DIAGNOSIS — E11.42 TYPE 2 DIABETES MELLITUS WITH DIABETIC POLYNEUROPATHY, WITH LONG-TERM CURRENT USE OF INSULIN: ICD-10-CM

## 2021-05-21 DIAGNOSIS — Z79.4 TYPE 2 DIABETES MELLITUS WITH DIABETIC POLYNEUROPATHY, WITH LONG-TERM CURRENT USE OF INSULIN: ICD-10-CM

## 2021-05-21 RX ORDER — INSULIN LISPRO 100 [IU]/ML
INJECTION, SOLUTION INTRAVENOUS; SUBCUTANEOUS
Qty: 30 ML | Refills: 0 | Status: SHIPPED | OUTPATIENT
Start: 2021-05-21 | End: 2021-06-24

## 2021-05-21 RX ORDER — INSULIN DEGLUDEC 200 U/ML
INJECTION, SOLUTION SUBCUTANEOUS
Qty: 30 ML | Refills: 0 | Status: SHIPPED | OUTPATIENT
Start: 2021-05-21 | End: 2021-06-24

## 2021-06-23 DIAGNOSIS — E11.42 TYPE 2 DIABETES MELLITUS WITH DIABETIC POLYNEUROPATHY, WITH LONG-TERM CURRENT USE OF INSULIN: ICD-10-CM

## 2021-06-23 DIAGNOSIS — Z79.4 TYPE 2 DIABETES MELLITUS WITH DIABETIC POLYNEUROPATHY, WITH LONG-TERM CURRENT USE OF INSULIN: ICD-10-CM

## 2021-06-24 RX ORDER — INSULIN LISPRO 100 [IU]/ML
INJECTION, SOLUTION INTRAVENOUS; SUBCUTANEOUS
Qty: 30 ML | Refills: 0 | Status: SHIPPED | OUTPATIENT
Start: 2021-06-24 | End: 2021-07-30

## 2021-06-24 RX ORDER — INSULIN DEGLUDEC 200 U/ML
INJECTION, SOLUTION SUBCUTANEOUS
Qty: 6 PEN | Refills: 0 | Status: SHIPPED | OUTPATIENT
Start: 2021-06-24 | End: 2021-07-30

## 2021-07-05 DIAGNOSIS — E11.42 TYPE 2 DIABETES MELLITUS WITH DIABETIC POLYNEUROPATHY, WITH LONG-TERM CURRENT USE OF INSULIN: Primary | ICD-10-CM

## 2021-07-05 DIAGNOSIS — Z79.4 TYPE 2 DIABETES MELLITUS WITH DIABETIC POLYNEUROPATHY, WITH LONG-TERM CURRENT USE OF INSULIN: Primary | ICD-10-CM

## 2021-07-06 RX ORDER — METFORMIN HYDROCHLORIDE 500 MG/1
TABLET, EXTENDED RELEASE ORAL
Qty: 180 TABLET | Refills: 0 | Status: SHIPPED | OUTPATIENT
Start: 2021-07-06 | End: 2021-08-16 | Stop reason: SDUPTHER

## 2021-07-08 ENCOUNTER — OFFICE VISIT (OUTPATIENT)
Dept: INTERNAL MEDICINE | Facility: CLINIC | Age: 51
End: 2021-07-08
Payer: COMMERCIAL

## 2021-07-08 ENCOUNTER — HOSPITAL ENCOUNTER (OUTPATIENT)
Dept: RADIOLOGY | Facility: HOSPITAL | Age: 51
Discharge: HOME | End: 2021-07-08
Attending: INTERNAL MEDICINE
Payer: COMMERCIAL

## 2021-07-08 VITALS
SYSTOLIC BLOOD PRESSURE: 130 MMHG | DIASTOLIC BLOOD PRESSURE: 88 MMHG | WEIGHT: 282 LBS | OXYGEN SATURATION: 98 % | HEIGHT: 76 IN | HEART RATE: 78 BPM | BODY MASS INDEX: 34.34 KG/M2

## 2021-07-08 DIAGNOSIS — Z79.4 TYPE 2 DIABETES MELLITUS WITH DIABETIC POLYNEUROPATHY, WITH LONG-TERM CURRENT USE OF INSULIN: ICD-10-CM

## 2021-07-08 DIAGNOSIS — E11.42 TYPE 2 DIABETES MELLITUS WITH DIABETIC POLYNEUROPATHY, WITH LONG-TERM CURRENT USE OF INSULIN: ICD-10-CM

## 2021-07-08 DIAGNOSIS — M79.5 FOREIGN BODY (FB) IN SOFT TISSUE: ICD-10-CM

## 2021-07-08 DIAGNOSIS — L03.818 CELLULITIS OF OTHER SPECIFIED SITE: Primary | ICD-10-CM

## 2021-07-08 DIAGNOSIS — L03.818 CELLULITIS OF OTHER SPECIFIED SITE: ICD-10-CM

## 2021-07-08 PROCEDURE — 73660 X-RAY EXAM OF TOE(S): CPT | Mod: TC,LT

## 2021-07-08 PROCEDURE — 99214 OFFICE O/P EST MOD 30 MIN: CPT | Mod: S$GLB,,, | Performed by: INTERNAL MEDICINE

## 2021-07-08 PROCEDURE — 73660 X-RAY EXAM OF TOE(S): CPT | Mod: 26,LT,, | Performed by: RADIOLOGY

## 2021-07-08 PROCEDURE — 99999 PR PBB SHADOW E&M-EST. PATIENT-LVL V: CPT | Mod: PBBFAC,,, | Performed by: INTERNAL MEDICINE

## 2021-07-08 PROCEDURE — 99214 PR OFFICE/OUTPT VISIT, EST, LEVL IV, 30-39 MIN: ICD-10-PCS | Mod: S$GLB,,, | Performed by: INTERNAL MEDICINE

## 2021-07-08 PROCEDURE — 73660 XR TOE 2 OR MORE VIEWS LEFT: ICD-10-PCS | Mod: 26,LT,, | Performed by: RADIOLOGY

## 2021-07-08 PROCEDURE — 99999 PR PBB SHADOW E&M-EST. PATIENT-LVL V: ICD-10-PCS | Mod: PBBFAC,,, | Performed by: INTERNAL MEDICINE

## 2021-07-08 RX ORDER — AMOXICILLIN AND CLAVULANATE POTASSIUM 875; 125 MG/1; MG/1
1 TABLET, FILM COATED ORAL 2 TIMES DAILY
Qty: 20 TABLET | Refills: 0 | Status: SHIPPED | OUTPATIENT
Start: 2021-07-08 | End: 2021-07-12

## 2021-07-12 ENCOUNTER — TELEPHONE (OUTPATIENT)
Dept: INTERNAL MEDICINE | Facility: CLINIC | Age: 51
End: 2021-07-12

## 2021-07-12 ENCOUNTER — OFFICE VISIT (OUTPATIENT)
Dept: INTERNAL MEDICINE | Facility: CLINIC | Age: 51
End: 2021-07-12
Payer: COMMERCIAL

## 2021-07-12 VITALS
WEIGHT: 279.75 LBS | DIASTOLIC BLOOD PRESSURE: 80 MMHG | SYSTOLIC BLOOD PRESSURE: 138 MMHG | HEART RATE: 74 BPM | OXYGEN SATURATION: 100 % | RESPIRATION RATE: 15 BRPM | TEMPERATURE: 98 F | HEIGHT: 76 IN | BODY MASS INDEX: 34.07 KG/M2

## 2021-07-12 DIAGNOSIS — E11.69 HYPERLIPIDEMIA ASSOCIATED WITH TYPE 2 DIABETES MELLITUS: ICD-10-CM

## 2021-07-12 DIAGNOSIS — E78.1 HYPERTRIGLYCERIDEMIA: ICD-10-CM

## 2021-07-12 DIAGNOSIS — E11.42 TYPE 2 DIABETES MELLITUS WITH DIABETIC POLYNEUROPATHY, WITH LONG-TERM CURRENT USE OF INSULIN: ICD-10-CM

## 2021-07-12 DIAGNOSIS — E11.59 HYPERTENSION ASSOCIATED WITH DIABETES: ICD-10-CM

## 2021-07-12 DIAGNOSIS — E78.5 HYPERLIPIDEMIA ASSOCIATED WITH TYPE 2 DIABETES MELLITUS: ICD-10-CM

## 2021-07-12 DIAGNOSIS — L02.612 ABSCESS OF TOE, LEFT: Primary | ICD-10-CM

## 2021-07-12 DIAGNOSIS — F41.9 ANXIETY: ICD-10-CM

## 2021-07-12 DIAGNOSIS — Z79.4 TYPE 2 DIABETES MELLITUS WITH DIABETIC POLYNEUROPATHY, WITH LONG-TERM CURRENT USE OF INSULIN: ICD-10-CM

## 2021-07-12 DIAGNOSIS — I15.2 HYPERTENSION ASSOCIATED WITH DIABETES: ICD-10-CM

## 2021-07-12 DIAGNOSIS — Z00.00 ANNUAL PHYSICAL EXAM: Primary | ICD-10-CM

## 2021-07-12 DIAGNOSIS — K21.9 GASTROESOPHAGEAL REFLUX DISEASE WITHOUT ESOPHAGITIS: Chronic | ICD-10-CM

## 2021-07-12 PROCEDURE — 99214 OFFICE O/P EST MOD 30 MIN: CPT | Mod: S$GLB,,, | Performed by: INTERNAL MEDICINE

## 2021-07-12 PROCEDURE — 99999 PR PBB SHADOW E&M-EST. PATIENT-LVL V: ICD-10-PCS | Mod: PBBFAC,,, | Performed by: INTERNAL MEDICINE

## 2021-07-12 PROCEDURE — 99214 PR OFFICE/OUTPT VISIT, EST, LEVL IV, 30-39 MIN: ICD-10-PCS | Mod: S$GLB,,, | Performed by: INTERNAL MEDICINE

## 2021-07-12 PROCEDURE — 99999 PR PBB SHADOW E&M-EST. PATIENT-LVL V: CPT | Mod: PBBFAC,,, | Performed by: INTERNAL MEDICINE

## 2021-07-12 RX ORDER — HYDROCODONE BITARTRATE AND ACETAMINOPHEN 7.5; 325 MG/1; MG/1
1 TABLET ORAL EVERY 6 HOURS PRN
Qty: 20 TABLET | Refills: 0 | Status: SHIPPED | OUTPATIENT
Start: 2021-07-12 | End: 2021-08-03 | Stop reason: SDUPTHER

## 2021-07-12 RX ORDER — CLINDAMYCIN HYDROCHLORIDE 300 MG/1
300 CAPSULE ORAL EVERY 8 HOURS
Qty: 30 CAPSULE | Refills: 0 | Status: SHIPPED | OUTPATIENT
Start: 2021-07-12 | End: 2021-07-21

## 2021-07-15 ENCOUNTER — LAB VISIT (OUTPATIENT)
Dept: LAB | Facility: HOSPITAL | Age: 51
End: 2021-07-15
Payer: COMMERCIAL

## 2021-07-15 ENCOUNTER — OFFICE VISIT (OUTPATIENT)
Dept: PODIATRY | Facility: CLINIC | Age: 51
End: 2021-07-15
Payer: COMMERCIAL

## 2021-07-15 VITALS
HEART RATE: 79 BPM | BODY MASS INDEX: 34.05 KG/M2 | SYSTOLIC BLOOD PRESSURE: 156 MMHG | DIASTOLIC BLOOD PRESSURE: 61 MMHG | HEIGHT: 76 IN

## 2021-07-15 DIAGNOSIS — Z79.4 TYPE 2 DIABETES MELLITUS WITH DIABETIC POLYNEUROPATHY, WITH LONG-TERM CURRENT USE OF INSULIN: ICD-10-CM

## 2021-07-15 DIAGNOSIS — L03.818 CELLULITIS OF OTHER SPECIFIED SITE: ICD-10-CM

## 2021-07-15 DIAGNOSIS — M86.172 OTHER ACUTE OSTEOMYELITIS OF LEFT FOOT: ICD-10-CM

## 2021-07-15 DIAGNOSIS — E11.42 TYPE 2 DIABETES MELLITUS WITH DIABETIC POLYNEUROPATHY, WITH LONG-TERM CURRENT USE OF INSULIN: ICD-10-CM

## 2021-07-15 DIAGNOSIS — L97.525 ULCER OF LEFT FOOT WITH MUSCLE INVOLVEMENT WITHOUT EVIDENCE OF NECROSIS: Primary | ICD-10-CM

## 2021-07-15 DIAGNOSIS — M79.5 FOREIGN BODY (FB) IN SOFT TISSUE: ICD-10-CM

## 2021-07-15 LAB
ESTIMATED AVG GLUCOSE: 237 MG/DL (ref 68–131)
HBA1C MFR BLD: 9.9 % (ref 4–5.6)

## 2021-07-15 PROCEDURE — 87076 CULTURE ANAEROBE IDENT EACH: CPT | Performed by: PODIATRIST

## 2021-07-15 PROCEDURE — 11043 DBRDMT MUSC&/FSCA 1ST 20/<: CPT | Mod: S$GLB,,, | Performed by: PODIATRIST

## 2021-07-15 PROCEDURE — 87070 CULTURE OTHR SPECIMN AEROBIC: CPT | Performed by: PODIATRIST

## 2021-07-15 PROCEDURE — 11043 PR DEBRIDEMENT, SKIN, SUB-Q TISSUE,MUSCLE,=<20 SQ CM: ICD-10-PCS | Mod: S$GLB,,, | Performed by: PODIATRIST

## 2021-07-15 PROCEDURE — 99204 OFFICE O/P NEW MOD 45 MIN: CPT | Mod: 25,S$GLB,, | Performed by: PODIATRIST

## 2021-07-15 PROCEDURE — 87186 SC STD MICRODIL/AGAR DIL: CPT | Mod: 59 | Performed by: PODIATRIST

## 2021-07-15 PROCEDURE — 87075 CULTR BACTERIA EXCEPT BLOOD: CPT | Performed by: PODIATRIST

## 2021-07-15 PROCEDURE — 83036 HEMOGLOBIN GLYCOSYLATED A1C: CPT | Performed by: NURSE PRACTITIONER

## 2021-07-15 PROCEDURE — 99999 PR PBB SHADOW E&M-EST. PATIENT-LVL V: ICD-10-PCS | Mod: PBBFAC,,, | Performed by: PODIATRIST

## 2021-07-15 PROCEDURE — 36415 COLL VENOUS BLD VENIPUNCTURE: CPT | Mod: PN | Performed by: NURSE PRACTITIONER

## 2021-07-15 PROCEDURE — 99204 PR OFFICE/OUTPT VISIT, NEW, LEVL IV, 45-59 MIN: ICD-10-PCS | Mod: 25,S$GLB,, | Performed by: PODIATRIST

## 2021-07-15 PROCEDURE — 87077 CULTURE AEROBIC IDENTIFY: CPT | Mod: 59 | Performed by: PODIATRIST

## 2021-07-15 PROCEDURE — 99999 PR PBB SHADOW E&M-EST. PATIENT-LVL V: CPT | Mod: PBBFAC,,, | Performed by: PODIATRIST

## 2021-07-19 ENCOUNTER — PATIENT MESSAGE (OUTPATIENT)
Dept: PODIATRY | Facility: CLINIC | Age: 51
End: 2021-07-19

## 2021-07-19 ENCOUNTER — OFFICE VISIT (OUTPATIENT)
Dept: PODIATRY | Facility: CLINIC | Age: 51
End: 2021-07-19
Payer: COMMERCIAL

## 2021-07-19 VITALS
SYSTOLIC BLOOD PRESSURE: 130 MMHG | HEART RATE: 74 BPM | DIASTOLIC BLOOD PRESSURE: 71 MMHG | HEIGHT: 76 IN | BODY MASS INDEX: 34.05 KG/M2

## 2021-07-19 DIAGNOSIS — L97.525 ULCER OF LEFT FOOT WITH MUSCLE INVOLVEMENT WITHOUT EVIDENCE OF NECROSIS: Primary | ICD-10-CM

## 2021-07-19 DIAGNOSIS — Z79.4 TYPE 2 DIABETES MELLITUS WITH DIABETIC POLYNEUROPATHY, WITH LONG-TERM CURRENT USE OF INSULIN: ICD-10-CM

## 2021-07-19 DIAGNOSIS — E11.42 TYPE 2 DIABETES MELLITUS WITH DIABETIC POLYNEUROPATHY, WITH LONG-TERM CURRENT USE OF INSULIN: ICD-10-CM

## 2021-07-19 PROCEDURE — 11042 DBRDMT SUBQ TIS 1ST 20SQCM/<: CPT | Mod: S$GLB,,, | Performed by: PODIATRIST

## 2021-07-19 PROCEDURE — 99999 PR PBB SHADOW E&M-EST. PATIENT-LVL IV: ICD-10-PCS | Mod: PBBFAC,,, | Performed by: PODIATRIST

## 2021-07-19 PROCEDURE — 99213 OFFICE O/P EST LOW 20 MIN: CPT | Mod: 25,S$GLB,, | Performed by: PODIATRIST

## 2021-07-19 PROCEDURE — 99213 PR OFFICE/OUTPT VISIT, EST, LEVL III, 20-29 MIN: ICD-10-PCS | Mod: 25,S$GLB,, | Performed by: PODIATRIST

## 2021-07-19 PROCEDURE — 99999 PR PBB SHADOW E&M-EST. PATIENT-LVL IV: CPT | Mod: PBBFAC,,, | Performed by: PODIATRIST

## 2021-07-19 PROCEDURE — 11042 PR DEBRIDEMENT, SKIN, SUB-Q TISSUE,=<20 SQ CM: ICD-10-PCS | Mod: S$GLB,,, | Performed by: PODIATRIST

## 2021-07-20 LAB
BACTERIA SPEC AEROBE CULT: ABNORMAL
BACTERIA SPEC AEROBE CULT: ABNORMAL
BACTERIA SPEC ANAEROBE CULT: ABNORMAL

## 2021-07-21 DIAGNOSIS — L02.612 ABSCESS OF TOE, LEFT: ICD-10-CM

## 2021-07-21 RX ORDER — CLINDAMYCIN HYDROCHLORIDE 300 MG/1
CAPSULE ORAL
Qty: 30 CAPSULE | Refills: 0 | Status: SHIPPED | OUTPATIENT
Start: 2021-07-21

## 2021-07-22 ENCOUNTER — TELEPHONE (OUTPATIENT)
Dept: PODIATRY | Facility: CLINIC | Age: 51
End: 2021-07-22

## 2021-07-23 ENCOUNTER — PATIENT OUTREACH (OUTPATIENT)
Dept: ADMINISTRATIVE | Facility: HOSPITAL | Age: 51
End: 2021-07-23

## 2021-07-23 ENCOUNTER — PATIENT MESSAGE (OUTPATIENT)
Dept: ADMINISTRATIVE | Facility: HOSPITAL | Age: 51
End: 2021-07-23

## 2021-07-26 ENCOUNTER — TELEPHONE (OUTPATIENT)
Dept: PODIATRY | Facility: CLINIC | Age: 51
End: 2021-07-26

## 2021-07-27 RX ORDER — OMEPRAZOLE 40 MG/1
CAPSULE, DELAYED RELEASE ORAL
Qty: 90 CAPSULE | Refills: 3 | Status: SHIPPED | OUTPATIENT
Start: 2021-07-27 | End: 2022-07-21

## 2021-07-29 ENCOUNTER — PATIENT MESSAGE (OUTPATIENT)
Dept: INTERNAL MEDICINE | Facility: CLINIC | Age: 51
End: 2021-07-29

## 2021-07-29 ENCOUNTER — TELEPHONE (OUTPATIENT)
Dept: PODIATRY | Facility: CLINIC | Age: 51
End: 2021-07-29

## 2021-08-01 ENCOUNTER — PATIENT MESSAGE (OUTPATIENT)
Dept: INTERNAL MEDICINE | Facility: CLINIC | Age: 51
End: 2021-08-01

## 2021-08-01 ENCOUNTER — PATIENT MESSAGE (OUTPATIENT)
Dept: PODIATRY | Facility: CLINIC | Age: 51
End: 2021-08-01

## 2021-08-02 ENCOUNTER — PATIENT MESSAGE (OUTPATIENT)
Dept: INTERNAL MEDICINE | Facility: CLINIC | Age: 51
End: 2021-08-02

## 2021-08-03 ENCOUNTER — OFFICE VISIT (OUTPATIENT)
Dept: PODIATRY | Facility: CLINIC | Age: 51
End: 2021-08-03
Payer: COMMERCIAL

## 2021-08-03 ENCOUNTER — PATIENT MESSAGE (OUTPATIENT)
Dept: INTERNAL MEDICINE | Facility: CLINIC | Age: 51
End: 2021-08-03

## 2021-08-03 ENCOUNTER — TELEPHONE (OUTPATIENT)
Dept: PODIATRY | Facility: CLINIC | Age: 51
End: 2021-08-03

## 2021-08-03 VITALS
HEIGHT: 76 IN | HEART RATE: 74 BPM | SYSTOLIC BLOOD PRESSURE: 151 MMHG | DIASTOLIC BLOOD PRESSURE: 83 MMHG | BODY MASS INDEX: 34.07 KG/M2 | WEIGHT: 279.75 LBS

## 2021-08-03 DIAGNOSIS — L02.612 ABSCESS OF TOE, LEFT: ICD-10-CM

## 2021-08-03 DIAGNOSIS — L97.523 ULCER OF GREAT TOE, LEFT, WITH NECROSIS OF MUSCLE: ICD-10-CM

## 2021-08-03 DIAGNOSIS — S91.122S: ICD-10-CM

## 2021-08-03 DIAGNOSIS — M86.9 OSTEOMYELITIS OF GREAT TOE OF LEFT FOOT: Primary | ICD-10-CM

## 2021-08-03 PROCEDURE — 87075 CULTR BACTERIA EXCEPT BLOOD: CPT | Performed by: PODIATRIST

## 2021-08-03 PROCEDURE — 88305 TISSUE EXAM BY PATHOLOGIST: CPT | Performed by: PATHOLOGY

## 2021-08-03 PROCEDURE — 87070 CULTURE OTHR SPECIMN AEROBIC: CPT | Performed by: PODIATRIST

## 2021-08-03 PROCEDURE — 99214 OFFICE O/P EST MOD 30 MIN: CPT | Mod: 25,S$GLB,, | Performed by: PODIATRIST

## 2021-08-03 PROCEDURE — 11043 DBRDMT MUSC&/FSCA 1ST 20/<: CPT | Mod: S$GLB,,, | Performed by: PODIATRIST

## 2021-08-03 PROCEDURE — 11043 PR DEBRIDEMENT, SKIN, SUB-Q TISSUE,MUSCLE,=<20 SQ CM: ICD-10-PCS | Mod: S$GLB,,, | Performed by: PODIATRIST

## 2021-08-03 PROCEDURE — 20220 PR BONE BIOPSY,TROCAR/NEEDLE SUPERF: ICD-10-PCS | Mod: 51,S$GLB,, | Performed by: PODIATRIST

## 2021-08-03 PROCEDURE — 99999 PR PBB SHADOW E&M-EST. PATIENT-LVL IV: ICD-10-PCS | Mod: PBBFAC,,, | Performed by: PODIATRIST

## 2021-08-03 PROCEDURE — 20220 BONE BIOPSY TROCAR/NDL SUPFC: CPT | Mod: 51,S$GLB,, | Performed by: PODIATRIST

## 2021-08-03 PROCEDURE — 99999 PR PBB SHADOW E&M-EST. PATIENT-LVL IV: CPT | Mod: PBBFAC,,, | Performed by: PODIATRIST

## 2021-08-03 PROCEDURE — 88305 TISSUE EXAM BY PATHOLOGIST: ICD-10-PCS | Mod: 26,,, | Performed by: PATHOLOGY

## 2021-08-03 PROCEDURE — 99214 PR OFFICE/OUTPT VISIT, EST, LEVL IV, 30-39 MIN: ICD-10-PCS | Mod: 25,S$GLB,, | Performed by: PODIATRIST

## 2021-08-03 PROCEDURE — 87176 TISSUE HOMOGENIZATION CULTR: CPT | Performed by: PODIATRIST

## 2021-08-03 PROCEDURE — 88305 TISSUE EXAM BY PATHOLOGIST: CPT | Mod: 26,,, | Performed by: PATHOLOGY

## 2021-08-03 RX ORDER — HYDROCODONE BITARTRATE AND ACETAMINOPHEN 7.5; 325 MG/1; MG/1
1 TABLET ORAL EVERY 8 HOURS PRN
Qty: 30 TABLET | Refills: 0 | Status: SHIPPED | OUTPATIENT
Start: 2021-08-03 | End: 2021-12-20 | Stop reason: SDUPTHER

## 2021-08-03 RX ORDER — SULFAMETHOXAZOLE AND TRIMETHOPRIM 400; 80 MG/1; MG/1
1 TABLET ORAL 2 TIMES DAILY
Qty: 20 TABLET | Refills: 0 | Status: SHIPPED | OUTPATIENT
Start: 2021-08-03 | End: 2021-08-16 | Stop reason: ALTCHOICE

## 2021-08-04 ENCOUNTER — PATIENT MESSAGE (OUTPATIENT)
Dept: INTERNAL MEDICINE | Facility: CLINIC | Age: 51
End: 2021-08-04

## 2021-08-06 ENCOUNTER — PATIENT MESSAGE (OUTPATIENT)
Dept: PODIATRY | Facility: CLINIC | Age: 51
End: 2021-08-06

## 2021-08-07 LAB — BACTERIA SPEC AEROBE CULT: NO GROWTH

## 2021-08-10 LAB — BACTERIA SPEC ANAEROBE CULT: NORMAL

## 2021-08-11 ENCOUNTER — TELEPHONE (OUTPATIENT)
Dept: PODIATRY | Facility: CLINIC | Age: 51
End: 2021-08-11

## 2021-08-11 LAB
FINAL PATHOLOGIC DIAGNOSIS: NORMAL
GROSS: NORMAL
Lab: NORMAL

## 2021-08-12 ENCOUNTER — OFFICE VISIT (OUTPATIENT)
Dept: PODIATRY | Facility: CLINIC | Age: 51
End: 2021-08-12
Payer: COMMERCIAL

## 2021-08-12 VITALS
BODY MASS INDEX: 34.05 KG/M2 | WEIGHT: 279.75 LBS | SYSTOLIC BLOOD PRESSURE: 127 MMHG | DIASTOLIC BLOOD PRESSURE: 72 MMHG | HEART RATE: 70 BPM

## 2021-08-12 DIAGNOSIS — L97.523 ULCER OF GREAT TOE, LEFT, WITH NECROSIS OF MUSCLE: Primary | ICD-10-CM

## 2021-08-12 PROCEDURE — 99024 POSTOP FOLLOW-UP VISIT: CPT | Mod: S$GLB,,, | Performed by: PODIATRIST

## 2021-08-12 PROCEDURE — 99024 PR POST-OP FOLLOW-UP VISIT: ICD-10-PCS | Mod: S$GLB,,, | Performed by: PODIATRIST

## 2021-08-12 PROCEDURE — 99999 PR PBB SHADOW E&M-EST. PATIENT-LVL IV: ICD-10-PCS | Mod: PBBFAC,,, | Performed by: PODIATRIST

## 2021-08-12 PROCEDURE — 99999 PR PBB SHADOW E&M-EST. PATIENT-LVL IV: CPT | Mod: PBBFAC,,, | Performed by: PODIATRIST

## 2021-08-13 ENCOUNTER — PATIENT OUTREACH (OUTPATIENT)
Dept: ADMINISTRATIVE | Facility: OTHER | Age: 51
End: 2021-08-13

## 2021-08-16 ENCOUNTER — OFFICE VISIT (OUTPATIENT)
Dept: ENDOCRINOLOGY | Facility: CLINIC | Age: 51
End: 2021-08-16
Payer: COMMERCIAL

## 2021-08-16 ENCOUNTER — PATIENT MESSAGE (OUTPATIENT)
Dept: ADMINISTRATIVE | Facility: HOSPITAL | Age: 51
End: 2021-08-16

## 2021-08-16 ENCOUNTER — PATIENT OUTREACH (OUTPATIENT)
Dept: ADMINISTRATIVE | Facility: HOSPITAL | Age: 51
End: 2021-08-16

## 2021-08-16 VITALS
TEMPERATURE: 98 F | DIASTOLIC BLOOD PRESSURE: 72 MMHG | WEIGHT: 285.13 LBS | BODY MASS INDEX: 34.7 KG/M2 | HEART RATE: 71 BPM | SYSTOLIC BLOOD PRESSURE: 126 MMHG

## 2021-08-16 DIAGNOSIS — L97.529 SKIN ULCER OF LEFT GREAT TOE, UNSPECIFIED ULCER STAGE: ICD-10-CM

## 2021-08-16 DIAGNOSIS — I10 ESSENTIAL HYPERTENSION: ICD-10-CM

## 2021-08-16 DIAGNOSIS — Z79.4 TYPE 2 DIABETES MELLITUS WITH DIABETIC POLYNEUROPATHY, WITH LONG-TERM CURRENT USE OF INSULIN: Primary | ICD-10-CM

## 2021-08-16 DIAGNOSIS — E11.42 TYPE 2 DIABETES MELLITUS WITH DIABETIC POLYNEUROPATHY, WITH LONG-TERM CURRENT USE OF INSULIN: Primary | ICD-10-CM

## 2021-08-16 PROCEDURE — 99999 PR PBB SHADOW E&M-EST. PATIENT-LVL V: CPT | Mod: PBBFAC,,, | Performed by: NURSE PRACTITIONER

## 2021-08-16 PROCEDURE — 99999 PR PBB SHADOW E&M-EST. PATIENT-LVL V: ICD-10-PCS | Mod: PBBFAC,,, | Performed by: NURSE PRACTITIONER

## 2021-08-16 PROCEDURE — 99214 OFFICE O/P EST MOD 30 MIN: CPT | Mod: S$GLB,,, | Performed by: NURSE PRACTITIONER

## 2021-08-16 PROCEDURE — 99214 PR OFFICE/OUTPT VISIT, EST, LEVL IV, 30-39 MIN: ICD-10-PCS | Mod: S$GLB,,, | Performed by: NURSE PRACTITIONER

## 2021-08-16 RX ORDER — BLOOD-GLUCOSE SENSOR
EACH MISCELLANEOUS
Qty: 3 DEVICE | Refills: 12 | Status: SHIPPED | OUTPATIENT
Start: 2021-08-16 | End: 2022-04-12 | Stop reason: SDUPTHER

## 2021-08-16 RX ORDER — INSULIN DEGLUDEC 200 U/ML
INJECTION, SOLUTION SUBCUTANEOUS
Qty: 6 PEN | Refills: 5 | Status: SHIPPED | OUTPATIENT
Start: 2021-08-16 | End: 2022-03-15

## 2021-08-16 RX ORDER — METFORMIN HYDROCHLORIDE 500 MG/1
1000 TABLET, EXTENDED RELEASE ORAL 2 TIMES DAILY WITH MEALS
Qty: 360 TABLET | Refills: 2 | Status: SHIPPED | OUTPATIENT
Start: 2021-08-16 | End: 2022-05-26

## 2021-08-16 RX ORDER — PIOGLITAZONEHYDROCHLORIDE 30 MG/1
30 TABLET ORAL DAILY
Qty: 30 TABLET | Refills: 3 | Status: SHIPPED | OUTPATIENT
Start: 2021-08-16 | End: 2022-04-07 | Stop reason: SDUPTHER

## 2021-08-16 RX ORDER — BLOOD-GLUCOSE TRANSMITTER
EACH MISCELLANEOUS
Qty: 1 DEVICE | Refills: 3 | Status: SHIPPED | OUTPATIENT
Start: 2021-08-16 | End: 2022-04-12 | Stop reason: SDUPTHER

## 2021-08-16 RX ORDER — INSULIN LISPRO 100 [IU]/ML
INJECTION, SOLUTION INTRAVENOUS; SUBCUTANEOUS
Qty: 45 ML | Refills: 2 | Status: SHIPPED | OUTPATIENT
Start: 2021-08-16 | End: 2022-01-19

## 2021-08-23 ENCOUNTER — TELEPHONE (OUTPATIENT)
Dept: INTERNAL MEDICINE | Facility: CLINIC | Age: 51
End: 2021-08-23
Payer: COMMERCIAL

## 2021-08-25 ENCOUNTER — PATIENT OUTREACH (OUTPATIENT)
Dept: ADMINISTRATIVE | Facility: HOSPITAL | Age: 51
End: 2021-08-25

## 2021-08-26 ENCOUNTER — OFFICE VISIT (OUTPATIENT)
Dept: PODIATRY | Facility: CLINIC | Age: 51
End: 2021-08-26
Payer: COMMERCIAL

## 2021-08-26 ENCOUNTER — PATIENT MESSAGE (OUTPATIENT)
Dept: PODIATRY | Facility: CLINIC | Age: 51
End: 2021-08-26

## 2021-08-26 VITALS
DIASTOLIC BLOOD PRESSURE: 85 MMHG | BODY MASS INDEX: 35.17 KG/M2 | WEIGHT: 288.81 LBS | HEART RATE: 69 BPM | HEIGHT: 76 IN | SYSTOLIC BLOOD PRESSURE: 154 MMHG

## 2021-08-26 DIAGNOSIS — L97.523 ULCER OF GREAT TOE, LEFT, WITH NECROSIS OF MUSCLE: Primary | ICD-10-CM

## 2021-08-26 DIAGNOSIS — E11.42 TYPE 2 DIABETES MELLITUS WITH DIABETIC POLYNEUROPATHY, WITH LONG-TERM CURRENT USE OF INSULIN: ICD-10-CM

## 2021-08-26 DIAGNOSIS — Z79.4 TYPE 2 DIABETES MELLITUS WITH DIABETIC POLYNEUROPATHY, WITH LONG-TERM CURRENT USE OF INSULIN: ICD-10-CM

## 2021-08-26 PROCEDURE — 99024 PR POST-OP FOLLOW-UP VISIT: ICD-10-PCS | Mod: S$GLB,,, | Performed by: PODIATRIST

## 2021-08-26 PROCEDURE — 99999 PR PBB SHADOW E&M-EST. PATIENT-LVL IV: ICD-10-PCS | Mod: PBBFAC,,, | Performed by: PODIATRIST

## 2021-08-26 PROCEDURE — 99999 PR PBB SHADOW E&M-EST. PATIENT-LVL IV: CPT | Mod: PBBFAC,,, | Performed by: PODIATRIST

## 2021-08-26 PROCEDURE — 99024 POSTOP FOLLOW-UP VISIT: CPT | Mod: S$GLB,,, | Performed by: PODIATRIST

## 2021-09-02 RX ORDER — HYDROCHLOROTHIAZIDE 25 MG/1
TABLET ORAL
Qty: 90 TABLET | Refills: 0 | Status: SHIPPED | OUTPATIENT
Start: 2021-09-02 | End: 2021-12-06

## 2021-09-02 RX ORDER — BUPROPION HYDROCHLORIDE 200 MG/1
TABLET, EXTENDED RELEASE ORAL
Qty: 180 TABLET | Refills: 0 | Status: SHIPPED | OUTPATIENT
Start: 2021-09-02 | End: 2021-12-27

## 2021-09-20 RX ORDER — PRAVASTATIN SODIUM 40 MG/1
TABLET ORAL
Qty: 90 TABLET | Refills: 0 | Status: SHIPPED | OUTPATIENT
Start: 2021-09-20 | End: 2021-12-21

## 2021-09-20 RX ORDER — LOSARTAN POTASSIUM 100 MG/1
TABLET ORAL
Qty: 90 TABLET | Refills: 0 | Status: SHIPPED | OUTPATIENT
Start: 2021-09-20 | End: 2021-12-21

## 2021-09-24 ENCOUNTER — PATIENT OUTREACH (OUTPATIENT)
Dept: ADMINISTRATIVE | Facility: OTHER | Age: 51
End: 2021-09-24

## 2021-09-24 DIAGNOSIS — Z79.4 TYPE 2 DIABETES MELLITUS WITH DIABETIC POLYNEUROPATHY, WITH LONG-TERM CURRENT USE OF INSULIN: Primary | ICD-10-CM

## 2021-09-24 DIAGNOSIS — E11.42 TYPE 2 DIABETES MELLITUS WITH DIABETIC POLYNEUROPATHY, WITH LONG-TERM CURRENT USE OF INSULIN: Primary | ICD-10-CM

## 2021-09-29 ENCOUNTER — OCCUPATIONAL HEALTH (OUTPATIENT)
Dept: URGENT CARE | Facility: CLINIC | Age: 51
End: 2021-09-29
Payer: COMMERCIAL

## 2021-09-29 DIAGNOSIS — Z02.1 PRE-EMPLOYMENT EXAMINATION: Primary | ICD-10-CM

## 2021-09-29 PROCEDURE — 99499 RETURN TO WORK EXAM: BASIC: ICD-10-PCS | Mod: S$GLB,,, | Performed by: PREVENTIVE MEDICINE

## 2021-09-29 PROCEDURE — 99499 UNLISTED E&M SERVICE: CPT | Mod: S$GLB,,, | Performed by: PREVENTIVE MEDICINE

## 2021-10-04 ENCOUNTER — PATIENT MESSAGE (OUTPATIENT)
Dept: ADMINISTRATIVE | Facility: HOSPITAL | Age: 51
End: 2021-10-04

## 2021-11-19 RX ORDER — AMLODIPINE BESYLATE 10 MG/1
TABLET ORAL
Qty: 90 TABLET | Refills: 3 | Status: SHIPPED | OUTPATIENT
Start: 2021-11-19 | End: 2022-12-08

## 2021-12-06 RX ORDER — FENOFIBRATE 145 MG/1
TABLET, FILM COATED ORAL
Qty: 30 TABLET | Refills: 0 | Status: SHIPPED | OUTPATIENT
Start: 2021-12-06 | End: 2021-12-27

## 2021-12-15 ENCOUNTER — PATIENT OUTREACH (OUTPATIENT)
Dept: ADMINISTRATIVE | Facility: HOSPITAL | Age: 51
End: 2021-12-15
Payer: COMMERCIAL

## 2021-12-20 ENCOUNTER — HOSPITAL ENCOUNTER (OUTPATIENT)
Dept: RADIOLOGY | Facility: HOSPITAL | Age: 51
Discharge: HOME | End: 2021-12-20
Attending: INTERNAL MEDICINE
Payer: COMMERCIAL

## 2021-12-20 ENCOUNTER — OFFICE VISIT (OUTPATIENT)
Dept: INTERNAL MEDICINE | Facility: CLINIC | Age: 51
End: 2021-12-20
Payer: COMMERCIAL

## 2021-12-20 DIAGNOSIS — M25.569 KNEE PAIN, UNSPECIFIED CHRONICITY, UNSPECIFIED LATERALITY: ICD-10-CM

## 2021-12-20 DIAGNOSIS — M25.569 KNEE PAIN, UNSPECIFIED CHRONICITY, UNSPECIFIED LATERALITY: Primary | ICD-10-CM

## 2021-12-20 PROCEDURE — 73562 X-RAY EXAM OF KNEE 3: CPT | Mod: 26,LT,, | Performed by: RADIOLOGY

## 2021-12-20 PROCEDURE — 73560 XR KNEE ORTHO LEFT: ICD-10-PCS | Mod: 26,RT,, | Performed by: RADIOLOGY

## 2021-12-20 PROCEDURE — 99214 PR OFFICE/OUTPT VISIT, EST, LEVL IV, 30-39 MIN: ICD-10-PCS | Mod: S$GLB,,, | Performed by: INTERNAL MEDICINE

## 2021-12-20 PROCEDURE — 99999 PR PBB SHADOW E&M-EST. PATIENT-LVL V: CPT | Mod: PBBFAC,,, | Performed by: INTERNAL MEDICINE

## 2021-12-20 PROCEDURE — 99214 OFFICE O/P EST MOD 30 MIN: CPT | Mod: S$GLB,,, | Performed by: INTERNAL MEDICINE

## 2021-12-20 PROCEDURE — 73560 X-RAY EXAM OF KNEE 1 OR 2: CPT | Mod: 59,TC,RT

## 2021-12-20 PROCEDURE — 99999 PR PBB SHADOW E&M-EST. PATIENT-LVL V: ICD-10-PCS | Mod: PBBFAC,,, | Performed by: INTERNAL MEDICINE

## 2021-12-20 PROCEDURE — 73560 X-RAY EXAM OF KNEE 1 OR 2: CPT | Mod: 26,RT,, | Performed by: RADIOLOGY

## 2021-12-20 PROCEDURE — 73562 XR KNEE ORTHO LEFT: ICD-10-PCS | Mod: 26,LT,, | Performed by: RADIOLOGY

## 2021-12-20 RX ORDER — HYDROCODONE BITARTRATE AND ACETAMINOPHEN 7.5; 325 MG/1; MG/1
1 TABLET ORAL EVERY 8 HOURS PRN
Qty: 21 TABLET | Refills: 0 | Status: SHIPPED | OUTPATIENT
Start: 2021-12-20 | End: 2021-12-27

## 2021-12-21 ENCOUNTER — PATIENT MESSAGE (OUTPATIENT)
Dept: ORTHOPEDICS | Facility: CLINIC | Age: 51
End: 2021-12-21

## 2021-12-21 ENCOUNTER — OFFICE VISIT (OUTPATIENT)
Dept: ORTHOPEDICS | Facility: CLINIC | Age: 51
End: 2021-12-21
Payer: COMMERCIAL

## 2021-12-21 ENCOUNTER — PATIENT OUTREACH (OUTPATIENT)
Dept: ADMINISTRATIVE | Facility: OTHER | Age: 51
End: 2021-12-21
Payer: COMMERCIAL

## 2021-12-21 VITALS
HEIGHT: 76 IN | WEIGHT: 287.25 LBS | SYSTOLIC BLOOD PRESSURE: 159 MMHG | BODY MASS INDEX: 34.98 KG/M2 | HEART RATE: 67 BPM | DIASTOLIC BLOOD PRESSURE: 86 MMHG

## 2021-12-21 DIAGNOSIS — M25.362 INSTABILITY OF LEFT KNEE JOINT: Primary | ICD-10-CM

## 2021-12-21 DIAGNOSIS — M25.569 KNEE PAIN, UNSPECIFIED CHRONICITY, UNSPECIFIED LATERALITY: ICD-10-CM

## 2021-12-21 DIAGNOSIS — M23.92 INTERNAL DERANGEMENT OF LEFT KNEE: ICD-10-CM

## 2021-12-21 PROCEDURE — 99999 PR PBB SHADOW E&M-EST. PATIENT-LVL V: ICD-10-PCS | Mod: PBBFAC,,, | Performed by: PHYSICIAN ASSISTANT

## 2021-12-21 PROCEDURE — 99203 OFFICE O/P NEW LOW 30 MIN: CPT | Mod: S$GLB,,, | Performed by: PHYSICIAN ASSISTANT

## 2021-12-21 PROCEDURE — 99203 PR OFFICE/OUTPT VISIT, NEW, LEVL III, 30-44 MIN: ICD-10-PCS | Mod: S$GLB,,, | Performed by: PHYSICIAN ASSISTANT

## 2021-12-21 PROCEDURE — 99999 PR PBB SHADOW E&M-EST. PATIENT-LVL V: CPT | Mod: PBBFAC,,, | Performed by: PHYSICIAN ASSISTANT

## 2021-12-23 ENCOUNTER — HOSPITAL ENCOUNTER (OUTPATIENT)
Dept: RADIOLOGY | Facility: HOSPITAL | Age: 51
Discharge: HOME | End: 2021-12-23
Attending: PHYSICIAN ASSISTANT
Payer: COMMERCIAL

## 2021-12-23 ENCOUNTER — PATIENT MESSAGE (OUTPATIENT)
Dept: ORTHOPEDICS | Facility: CLINIC | Age: 51
End: 2021-12-23
Payer: COMMERCIAL

## 2021-12-23 DIAGNOSIS — M23.92 INTERNAL DERANGEMENT OF LEFT KNEE: ICD-10-CM

## 2021-12-23 DIAGNOSIS — M25.362 INSTABILITY OF LEFT KNEE JOINT: ICD-10-CM

## 2021-12-23 PROCEDURE — 73721 MRI JNT OF LWR EXTRE W/O DYE: CPT | Mod: TC,PO,LT

## 2021-12-24 VITALS
HEIGHT: 76 IN | BODY MASS INDEX: 34.96 KG/M2 | OXYGEN SATURATION: 98 % | TEMPERATURE: 99 F | SYSTOLIC BLOOD PRESSURE: 138 MMHG | HEART RATE: 67 BPM | DIASTOLIC BLOOD PRESSURE: 88 MMHG | WEIGHT: 287.06 LBS

## 2022-01-04 ENCOUNTER — OFFICE VISIT (OUTPATIENT)
Dept: SPORTS MEDICINE | Facility: CLINIC | Age: 52
End: 2022-01-04
Payer: COMMERCIAL

## 2022-01-04 DIAGNOSIS — S83.522A TEAR OF PCL (POSTERIOR CRUCIATE LIGAMENT) OF KNEE, LEFT, INITIAL ENCOUNTER: Primary | ICD-10-CM

## 2022-01-04 PROCEDURE — 99204 PR OFFICE/OUTPT VISIT, NEW, LEVL IV, 45-59 MIN: ICD-10-PCS | Mod: S$GLB,,, | Performed by: STUDENT IN AN ORGANIZED HEALTH CARE EDUCATION/TRAINING PROGRAM

## 2022-01-04 PROCEDURE — 99999 PR PBB SHADOW E&M-EST. PATIENT-LVL II: CPT | Mod: PBBFAC,,, | Performed by: STUDENT IN AN ORGANIZED HEALTH CARE EDUCATION/TRAINING PROGRAM

## 2022-01-04 PROCEDURE — 99204 OFFICE O/P NEW MOD 45 MIN: CPT | Mod: S$GLB,,, | Performed by: STUDENT IN AN ORGANIZED HEALTH CARE EDUCATION/TRAINING PROGRAM

## 2022-01-04 PROCEDURE — 99999 PR PBB SHADOW E&M-EST. PATIENT-LVL II: ICD-10-PCS | Mod: PBBFAC,,, | Performed by: STUDENT IN AN ORGANIZED HEALTH CARE EDUCATION/TRAINING PROGRAM

## 2022-01-04 NOTE — PROGRESS NOTES
Subjective:          Chief Complaint: Lopez Hicks is a 51 y.o. male who had no chief complaint listed for this encounter.    Lopez Hicks is a 51 y.o. male  that comes in today for evaluation of his left knee. He states that 2 weeks ago he suffered a trip and fall to a flexed knee while walking through his garage caring a case of water. He comes in today with a completed MRI in his chart. He states that his pain is a 10/10 at its worst however not constant. He locates his pain as primarily posterior left knee. He notes the worst of his pain is when his knee is in a flexed position.  He has been wearing a short hinged brace which provides some relief.  Has some subjective feelings of knee instability when his leg is completely extended.  Denies any mechanical symptoms.  Denies any numbness or paresthesias to the left lower extremity.    Past Medical History:   Diagnosis Date    Diabetes mellitus, type 2     GERD (gastroesophageal reflux disease)     Hyperlipidemia     Hyperthyroidism     Mild nonproliferative diabetic retinopathy 2015       Current Outpatient Medications on File Prior to Visit   Medication Sig Dispense Refill    buPROPion (WELLBUTRIN SR) 200 MG SR12 TAKE ONE TABLET BY MOUTH 2 TIMES A  tablet 3    amLODIPine (NORVASC) 10 MG tablet TAKE 1 TABLET BY MOUTH ONCE DAILY 90 tablet 3    aspirin (ECOTRIN) 81 MG EC tablet Take 81 mg by mouth once daily.      atenoloL (TENORMIN) 100 MG tablet TAKE 1 TABLET BY MOUTH ONCE DAILY  90 tablet 3    blood sugar diagnostic Strp Check blood glucose 4 times a day - before meals and at bedtime. 400 strip 3    blood-glucose sensor (DEXCOM G6 SENSOR) Stephanie Change sensor every 10 days 3 Device 12    blood-glucose transmitter (DEXCOM G6 TRANSMITTER) Stephanie Change every 3 months 1 Device 3    clindamycin (CLEOCIN) 300 MG capsule TAKE ONE CAPSULE BY MOUTH EVERY EIGHT HOURS 30 capsule 0    diabetic supplies, miscellan. Kit  "Please change sensor every 14 days. FreeStyle Kayleigh Sensor 30-day supply (2 sensors/month) 2 kit 11    fenofibrate (TRICOR) 145 MG tablet TAKE 1 TABLET BY MOUTH ONCE DAILY 30 tablet 0    hydroCHLOROthiazide (HYDRODIURIL) 25 MG tablet TAKE 1 TABLET BY MOUTH ONCE DAILY 90 tablet 1    insulin degludec (TRESIBA FLEXTOUCH U-200) 200 unit/mL (3 mL) insulin pen INJECT 60 UNITS UNDER THE SKIN 2 TIMES A DAY 6 pen 5    insulin lispro (HUMALOG KWIKPEN INSULIN) 100 unit/mL pen INJECT 30 UNITS SUBCUTANEOUSLY BEFORE MEALS. 45 mL 2    losartan (COZAAR) 100 MG tablet TAKE 1 TABLET BY MOUTH ONCE DAILY 90 tablet 3    metFORMIN (GLUCOPHAGE-XR) 500 MG ER 24hr tablet Take 2 tablets (1,000 mg total) by mouth 2 (two) times daily with meals. 360 tablet 2    multivitamin (THERAGRAN) per tablet Take 1 tablet by mouth once daily.      omeprazole (PRILOSEC) 40 MG capsule TAKE 1 CAPSULE BY MOUTH ONCE DAILY 90 capsule 3    ONETOUCH DELICA LANCETS 33 gauge Misc       pen needle, diabetic (BD TENA 2ND GEN PEN NEEDLE) 32 gauge x 5/32" Ndle USE ONE NEEDLE FOR INJECTION FOUR TIMES DAILY 400 each 3    pioglitazone (ACTOS) 30 MG tablet Take 1 tablet (30 mg total) by mouth once daily. 30 tablet 3    pravastatin (PRAVACHOL) 40 MG tablet TAKE 1 TABLET BY MOUTH ONCE DAILY 90 tablet 3    sildenafil (VIAGRA) 100 MG tablet       vitamin D (VITAMIN D3) 1000 units Tab Take 1,000 Units by mouth once daily.       No current facility-administered medications on file prior to visit.       Past Surgical History:   Procedure Laterality Date    CHOLECYSTECTOMY      LUNG LOBECTOMY Right     after severe pneumonia    NASAL/SINUS ENDOSCOPY      ROTATOR CUFF REPAIR Bilateral     Dr. JERRY Cardona, and Dr. Jung.       Family History   Problem Relation Age of Onset    Hypertension Mother     Diabetes Father     Hyperlipidemia Father     Hypertension Father     Heart disease Father     Prostate cancer Father 60        radioactive seed    Diabetes " Paternal Grandmother        Social History     Socioeconomic History    Marital status:     Number of children: 3   Occupational History    Occupation:    Tobacco Use    Smoking status: Never Smoker    Smokeless tobacco: Never Used   Substance and Sexual Activity    Alcohol use: Yes     Alcohol/week: 0.0 standard drinks     Comment: beer once every 3 months    Drug use: No    Sexual activity: Yes     Partners: Female   Social History Narrative     and lives with wife and twin daughters.  Works at [employer].  Enjoys hunting and fishing.  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        Review of Systems   Constitutional: Negative.   HENT: Negative.    Eyes: Negative.    Cardiovascular: Negative.    Respiratory: Negative.    Endocrine: Negative.    Hematologic/Lymphatic: Negative.    Skin: Negative.    Musculoskeletal: Positive for falls (fall to flexed left knee ), joint pain (left knee) and joint swelling (Left knee). Negative for arthritis, back pain, gout, muscle cramps, muscle weakness, myalgias, neck pain and stiffness.   Neurological: Negative.    Psychiatric/Behavioral: Negative.    Allergic/Immunologic: Negative.                    Objective:        General: Lopez is well-developed, well-nourished, appears stated age, in no acute distress, alert and oriented to time, place and person.     General    Nursing note and vitals reviewed.  Constitutional: He is oriented to person, place, and time. He appears well-developed and well-nourished. No distress.   HENT:   Head: Normocephalic and atraumatic.   Nose: Nose normal.   Eyes: EOM are normal.   Cardiovascular: Normal rate and intact distal pulses.    Pulmonary/Chest: Effort normal. No respiratory distress.   Neurological: He is alert and oriented to person, place, and time.   Psychiatric: He has a normal mood and affect. His behavior is normal. Judgment and thought content normal.     General Musculoskeletal Exam    Gait: abnormal and antalgic       Right Knee Exam   Right knee exam is normal.    Inspection   Erythema: absent  Scars: absent  Swelling: absent  Effusion: absent  Deformity: absent  Bruising: absent    Tenderness   The patient is experiencing no tenderness.     Range of Motion   Extension: -5   Flexion: 140     Tests   Meniscus   Mary:  Medial - negative Lateral - negative  Ligament Examination Lachman: normal (-1 to 2mm) PCL-Posterior Drawer: normal (0 to 2mm)     MCL - Valgus: normal (0 to 2mm)  LCL - Varus: normal  Patella   Passive Patellar Tilt: neutral  Patellar Tracking: normal    Other   Sensation: normal    Left Knee Exam     Inspection   Erythema: absent  Scars: absent  Swelling: present  Effusion: present  Deformity: absent  Bruising: absent    Tenderness   Left knee tenderness location: Posterior joint line.    Range of Motion   Extension: -5   Flexion: 130     Tests   Meniscus   Mary:  Medial - negative Lateral - negative  Stability Lachman: normal (-1 to 2mm)   PCL-Posterior Drawer: abnormal - grade II  MCL - Valgus: normal (0 to 2mm)  LCL - Varus: normal (0 to 2mm)  Posterior Sag Test: positive  Posterolateral Corner: stable  Patella   Passive Patellar Tilt: neutral  Patellar Tracking: normal    Other   Sensation: normal    Muscle Strength   Right Lower Extremity   Quadriceps:  5/5   Hamstrin/5   Left Lower Extremity   Quadriceps:  4/5   Hamstrin/5     Vascular Exam     Right Pulses  Dorsalis Pedis:      2+  Posterior Tibial:      2+        Left Pulses  Dorsalis Pedis:      2+  Posterior Tibial:      2+          Imaging:  X-rays of the left knee from 2021 personally reviewed by me 2022.  These include weight-bearing AP, lateral, and Merchant views.  Joint spaces are well preserved in all 3 compartments.    MRI of the left knee from 2021 personally reviewed by me 2022.  There is a high-grade to tearing of the distal aspect of the posterior cruciate ligament.   There is a joint effusion.  The ACL and collateral ligaments are intact.  Mediolateral meniscus are intact.  There is grade 2-3 changes in the cartilage of the medial compartment and patellofemoral compartment, lateral compartment is unremarkable.          Assessment:     Lopez Hicks is a 51 y.o. male with high-grade 2 tearing of the PCL  Encounter Diagnosis   Name Primary?    Tear of PCL (posterior cruciate ligament) of knee, left, initial encounter Yes          Plan:       The diagnosis and treatment options were discussed at length with the patient all his questions were answered.  I showed him his images I explained the findings to him.  I suggested we begin with non operative treatment.  We will place him in a long runner knee brace with a posterior bump to take tension off of the PCL.  We will refer med physical therapy to work on quadriceps and hamstring strengthening.  He will return to clinic in 6 weeks for re-evaluation    All of their questions were answered.  They will call the clinic with any questions or concerns in the interim.    Should the patient's symptoms worsen, persist, or fail to improve they should return for reevaluation and I would be happy to see them back anytime.        Sukumar Agosto M.D.     Please be aware that this note has been generated with the assistance of [employer] voice-to-text.  Please excuse any spelling or grammatical errors.    Thank you for choosing Dr. Sukumar Agosto for your sports medicine care. It is our goal to provide you with exceptional care that will help keep you healthy, active, and get you back in the game.     If you felt that you received exemplary care today, please consider leaving feedback for Dr. Agosto on [employer] at [email or web address].    Please do not hesitate to reach out to us via email, phone, or MyChart with any questions, concerns, or feedback.

## 2022-01-10 PROBLEM — R29.898 DECREASED STRENGTH INVOLVING KNEE JOINT: Status: ACTIVE | Noted: 2022-01-10

## 2022-01-10 PROBLEM — Z74.09 IMPAIRED FUNCTIONAL MOBILITY AND ACTIVITY TOLERANCE: Status: ACTIVE | Noted: 2022-01-10

## 2022-01-18 ENCOUNTER — PATIENT MESSAGE (OUTPATIENT)
Dept: ADMINISTRATIVE | Facility: HOSPITAL | Age: 52
End: 2022-01-18
Payer: COMMERCIAL

## 2022-01-19 DIAGNOSIS — E11.42 TYPE 2 DIABETES MELLITUS WITH DIABETIC POLYNEUROPATHY, WITH LONG-TERM CURRENT USE OF INSULIN: ICD-10-CM

## 2022-01-19 DIAGNOSIS — Z79.4 TYPE 2 DIABETES MELLITUS WITH DIABETIC POLYNEUROPATHY, WITH LONG-TERM CURRENT USE OF INSULIN: ICD-10-CM

## 2022-01-19 RX ORDER — INSULIN LISPRO 100 [IU]/ML
INJECTION, SOLUTION INTRAVENOUS; SUBCUTANEOUS
Qty: 45 ML | Refills: 0 | Status: SHIPPED | OUTPATIENT
Start: 2022-01-19 | End: 2022-03-15

## 2022-02-14 ENCOUNTER — OFFICE VISIT (OUTPATIENT)
Dept: SPORTS MEDICINE | Facility: CLINIC | Age: 52
End: 2022-02-14
Payer: COMMERCIAL

## 2022-02-14 VITALS
HEART RATE: 77 BPM | WEIGHT: 285.06 LBS | BODY MASS INDEX: 34.71 KG/M2 | HEIGHT: 76 IN | SYSTOLIC BLOOD PRESSURE: 151 MMHG | DIASTOLIC BLOOD PRESSURE: 79 MMHG

## 2022-02-14 DIAGNOSIS — S83.522D TEAR OF PCL (POSTERIOR CRUCIATE LIGAMENT) OF KNEE, LEFT, SUBSEQUENT ENCOUNTER: Primary | ICD-10-CM

## 2022-02-14 PROCEDURE — 99999 PR PBB SHADOW E&M-EST. PATIENT-LVL IV: ICD-10-PCS | Mod: PBBFAC,,, | Performed by: STUDENT IN AN ORGANIZED HEALTH CARE EDUCATION/TRAINING PROGRAM

## 2022-02-14 PROCEDURE — 99213 OFFICE O/P EST LOW 20 MIN: CPT | Mod: S$GLB,,, | Performed by: STUDENT IN AN ORGANIZED HEALTH CARE EDUCATION/TRAINING PROGRAM

## 2022-02-14 PROCEDURE — 99999 PR PBB SHADOW E&M-EST. PATIENT-LVL IV: CPT | Mod: PBBFAC,,, | Performed by: STUDENT IN AN ORGANIZED HEALTH CARE EDUCATION/TRAINING PROGRAM

## 2022-02-14 PROCEDURE — 99213 PR OFFICE/OUTPT VISIT, EST, LEVL III, 20-29 MIN: ICD-10-PCS | Mod: S$GLB,,, | Performed by: STUDENT IN AN ORGANIZED HEALTH CARE EDUCATION/TRAINING PROGRAM

## 2022-02-14 NOTE — PROGRESS NOTES
Subjective:          Chief Complaint: Lopez Hicks is a 51 y.o. male who had concerns including Follow-up of the Left Knee.    Lopez Hicks is a 51 y.o. male  that comes in today for follow-up for his left PCL tear, grade 2. He was last seen several weeks ago for the same complaint and referred to physical therapy.  He did physical therapy for 2-3 weeks and then transition to a home program.  He says overall he feels 100% improved.  No longer has pain in his knee and is able to kneel without any pain or issues.  No instability.    Follow-up  Pertinent negatives include no joint swelling, myalgias or neck pain.     Past Medical History:   Diagnosis Date    Diabetes mellitus, type 2     GERD (gastroesophageal reflux disease)     Hyperlipidemia     Hyperthyroidism     Mild nonproliferative diabetic retinopathy 2015       Current Outpatient Medications on File Prior to Visit   Medication Sig Dispense Refill    amLODIPine (NORVASC) 10 MG tablet TAKE 1 TABLET BY MOUTH ONCE DAILY 90 tablet 3    aspirin (ECOTRIN) 81 MG EC tablet Take 81 mg by mouth once daily.      atenoloL (TENORMIN) 100 MG tablet TAKE 1 TABLET BY MOUTH ONCE DAILY  90 tablet 3    blood sugar diagnostic Strp Check blood glucose 4 times a day - before meals and at bedtime. 400 strip 3    blood-glucose sensor (DEXCOM G6 SENSOR) Stephanie Change sensor every 10 days 3 Device 12    blood-glucose transmitter (DEXCOM G6 TRANSMITTER) Stephanie Change every 3 months 1 Device 3    buPROPion (WELLBUTRIN SR) 200 MG SR12 TAKE ONE TABLET BY MOUTH 2 TIMES A  tablet 3    diabetic supplies, miscellan. Kit Please change sensor every 14 days. FreeStyle Kayleigh Sensor 30-day supply (2 sensors/month) 2 kit 11    fenofibrate (TRICOR) 145 MG tablet TAKE 1 TABLET BY MOUTH ONCE DAILY 90 tablet 3    hydroCHLOROthiazide (HYDRODIURIL) 25 MG tablet TAKE 1 TABLET BY MOUTH ONCE DAILY 90 tablet 1    insulin degludec (TRESIBA FLEXTOUCH U-200)  "200 unit/mL (3 mL) insulin pen INJECT 60 UNITS UNDER THE SKIN 2 TIMES A DAY 6 pen 5    insulin lispro (HUMALOG KWIKPEN INSULIN) 100 unit/mL pen INJECT 40 UNITS SUBCUTANEOUSLY BEFORE MEALS. 45 mL 0    losartan (COZAAR) 100 MG tablet TAKE 1 TABLET BY MOUTH ONCE DAILY 90 tablet 3    metFORMIN (GLUCOPHAGE-XR) 500 MG ER 24hr tablet Take 2 tablets (1,000 mg total) by mouth 2 (two) times daily with meals. 360 tablet 2    multivitamin (THERAGRAN) per tablet Take 1 tablet by mouth once daily.      omeprazole (PRILOSEC) 40 MG capsule TAKE 1 CAPSULE BY MOUTH ONCE DAILY 90 capsule 3    ONETOUCH DELICA LANCETS 33 gauge Misc       pen needle, diabetic (BD TENA 2ND GEN PEN NEEDLE) 32 gauge x 5/32" Ndle USE ONE NEEDLE FOR INJECTION FOUR TIMES DAILY 400 each 3    pioglitazone (ACTOS) 30 MG tablet Take 1 tablet (30 mg total) by mouth once daily. 30 tablet 3    pravastatin (PRAVACHOL) 40 MG tablet TAKE 1 TABLET BY MOUTH ONCE DAILY 90 tablet 3    sildenafil (VIAGRA) 100 MG tablet       vitamin D (VITAMIN D3) 1000 units Tab Take 1,000 Units by mouth once daily.      clindamycin (CLEOCIN) 300 MG capsule TAKE ONE CAPSULE BY MOUTH EVERY EIGHT HOURS (Patient not taking: Reported on 2/14/2022) 30 capsule 0     No current facility-administered medications on file prior to visit.       Past Surgical History:   Procedure Laterality Date    CHOLECYSTECTOMY      LUNG LOBECTOMY Right     after severe pneumonia    NASAL/SINUS ENDOSCOPY      ROTATOR CUFF REPAIR Bilateral     Dr. JERRY Cardona, and Dr. Jung.       Family History   Problem Relation Age of Onset    Hypertension Mother     Diabetes Father     Hyperlipidemia Father     Hypertension Father     Heart disease Father     Prostate cancer Father 60        radioactive seed    Diabetes Paternal Grandmother        Social History     Socioeconomic History    Marital status:     Number of children: 3   Occupational History    Occupation:    Tobacco Use    " Smoking status: Never Smoker    Smokeless tobacco: Never Used   Substance and Sexual Activity    Alcohol use: Yes     Alcohol/week: 0.0 standard drinks     Comment: beer once every 3 months    Drug use: No    Sexual activity: Yes     Partners: Female   Social History Narrative     and lives with wife and twin daughters.  Works at [employer].  Enjoys hunting and fishing.  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        Review of Systems   Constitutional: Negative.   HENT: Negative.    Eyes: Negative.    Cardiovascular: Negative.    Respiratory: Negative.    Endocrine: Negative.    Hematologic/Lymphatic: Negative.    Skin: Negative.    Musculoskeletal: Negative for arthritis, back pain, falls, gout, joint pain, joint swelling, muscle cramps, muscle weakness, myalgias, neck pain and stiffness.   Neurological: Negative.    Psychiatric/Behavioral: Negative.    Allergic/Immunologic: Negative.                    Objective:        General: Lopez is well-developed, well-nourished, appears stated age, in no acute distress, alert and oriented to time, place and person.     General    Nursing note and vitals reviewed.  Constitutional: He is oriented to person, place, and time. He appears well-developed and well-nourished. No distress.   HENT:   Head: Normocephalic and atraumatic.   Nose: Nose normal.   Eyes: EOM are normal.   Cardiovascular: Normal rate and intact distal pulses.    Pulmonary/Chest: Effort normal. No respiratory distress.   Neurological: He is alert and oriented to person, place, and time.   Psychiatric: He has a normal mood and affect. His behavior is normal. Judgment and thought content normal.     General Musculoskeletal Exam   Gait: normal       Right Knee Exam   Right knee exam is normal.    Inspection   Erythema: absent  Scars: absent  Swelling: absent  Effusion: absent  Deformity: absent  Bruising: absent    Tenderness   The patient is experiencing no tenderness.     Range  of Motion   Extension: -5   Flexion: 140     Tests   Meniscus   Mary:  Medial - negative Lateral - negative  Ligament Examination Lachman: normal (-1 to 2mm) PCL-Posterior Drawer: normal (0 to 2mm)     MCL - Valgus: normal (0 to 2mm)  LCL - Varus: normal  Patella   Passive Patellar Tilt: neutral  Patellar Tracking: normal    Other   Sensation: normal    Left Knee Exam     Inspection   Erythema: absent  Scars: absent  Swelling: absent  Effusion: absent  Deformity: absent  Bruising: absent    Tenderness   Left knee tenderness location: Posterior joint line.    Range of Motion   Extension: -5   Flexion: 140     Tests   Meniscus   Mary:  Medial - negative Lateral - negative  Stability Lachman: normal (-1 to 2mm)   PCL-Posterior Drawer: abnormal - grade II  MCL - Valgus: normal (0 to 2mm)  LCL - Varus: normal (0 to 2mm)  Posterior Sag Test: positive  Posterolateral Corner: stable  Patella   Passive Patellar Tilt: neutral  Patellar Tracking: normal    Other   Sensation: normal    Muscle Strength   Right Lower Extremity   Quadriceps:  5/5   Hamstrin/5   Left Lower Extremity   Quadriceps:  4/5   Hamstrin/5     Vascular Exam     Right Pulses  Dorsalis Pedis:      2+  Posterior Tibial:      2+        Left Pulses  Dorsalis Pedis:      2+  Posterior Tibial:      2+          Imaging:  X-rays of the left knee from 2021 personally reviewed by me 2022.  These include weight-bearing AP, lateral, and Merchant views.  Joint spaces are well preserved in all 3 compartments.    MRI of the left knee from 2021 personally reviewed by me 2022.  There is a high-grade to tearing of the distal aspect of the posterior cruciate ligament.  There is a joint effusion.  The ACL and collateral ligaments are intact.  Mediolateral meniscus are intact.  There is grade 2-3 changes in the cartilage of the medial compartment and patellofemoral compartment, lateral compartment is unremarkable.          Assessment:      Lopez Hicks is a 51 y.o. male with high-grade 2 tearing of the PCL with great improvement with physical therapy.  Encounter Diagnosis   Name Primary?    Tear of PCL (posterior cruciate ligament) of knee, left, subsequent encounter Yes          Plan:       Jose has made great progress with non operative management.  He will continue home-based rehabilitation.  He no longer has any instability or pains.  He can return to clinic as needed as I think he will do quite well with non operative management.    All of their questions were answered.  They will call the clinic with any questions or concerns in the interim.    Should the patient's symptoms worsen, persist, or fail to improve they should return for reevaluation and I would be happy to see them back anytime.        Sukumar Agosto M.D.     Please be aware that this note has been generated with the assistance of [employer] voice-to-text.  Please excuse any spelling or grammatical errors.    Thank you for choosing Dr. Sukumar Agosto for your sports medicine care. It is our goal to provide you with exceptional care that will help keep you healthy, active, and get you back in the game.     If you felt that you received exemplary care today, please consider leaving feedback for Dr. Agosto on [employer] at [email or web address].    Please do not hesitate to reach out to us via email, phone, or MyChart with any questions, concerns, or feedback.

## 2022-02-28 ENCOUNTER — PATIENT MESSAGE (OUTPATIENT)
Dept: ADMINISTRATIVE | Facility: HOSPITAL | Age: 52
End: 2022-02-28
Payer: COMMERCIAL

## 2022-02-28 ENCOUNTER — PATIENT OUTREACH (OUTPATIENT)
Dept: ADMINISTRATIVE | Facility: HOSPITAL | Age: 52
End: 2022-02-28
Payer: COMMERCIAL

## 2022-03-02 DIAGNOSIS — E11.9 TYPE 2 DIABETES MELLITUS WITHOUT COMPLICATION, UNSPECIFIED WHETHER LONG TERM INSULIN USE: ICD-10-CM

## 2022-03-15 DIAGNOSIS — Z79.4 TYPE 2 DIABETES MELLITUS WITH DIABETIC POLYNEUROPATHY, WITH LONG-TERM CURRENT USE OF INSULIN: ICD-10-CM

## 2022-03-15 DIAGNOSIS — E11.42 TYPE 2 DIABETES MELLITUS WITH DIABETIC POLYNEUROPATHY, WITH LONG-TERM CURRENT USE OF INSULIN: ICD-10-CM

## 2022-03-15 RX ORDER — INSULIN DEGLUDEC 200 U/ML
INJECTION, SOLUTION SUBCUTANEOUS
Qty: 6 PEN | Refills: 0 | Status: SHIPPED | OUTPATIENT
Start: 2022-03-15 | End: 2022-04-07 | Stop reason: SDUPTHER

## 2022-03-15 RX ORDER — INSULIN LISPRO 100 [IU]/ML
INJECTION, SOLUTION INTRAVENOUS; SUBCUTANEOUS
Qty: 45 ML | Refills: 0 | Status: SHIPPED | OUTPATIENT
Start: 2022-03-15 | End: 2022-04-12 | Stop reason: SDUPTHER

## 2022-03-15 NOTE — TELEPHONE ENCOUNTER
Please schedule f/u appt with labs prior.   No more refills will be given until pt is seen in clinic. Please send letter notification if unable to reach pt by phone.

## 2022-04-07 DIAGNOSIS — E11.42 TYPE 2 DIABETES MELLITUS WITH DIABETIC POLYNEUROPATHY, WITH LONG-TERM CURRENT USE OF INSULIN: ICD-10-CM

## 2022-04-07 DIAGNOSIS — Z79.4 TYPE 2 DIABETES MELLITUS WITH DIABETIC POLYNEUROPATHY, WITH LONG-TERM CURRENT USE OF INSULIN: ICD-10-CM

## 2022-04-07 NOTE — TELEPHONE ENCOUNTER
----- Message from Fatimah Hoang sent at 4/7/2022  9:49 AM CDT -----  Regarding: self  .Type: RX Refill Request    Who Called: self     Have you contacted your pharmacy    Refill or New Rx: refill     RX Name and Strength:  losartan (COZAAR) 100 MG tablet  TRESIBA FLEXTOUCH pioglitazone (ACTOS) 30 MG tablet  U-200 200 unit/mL (3 mL) insulin pen 6 pen   pioglitazone (ACTOS) 30 MG tablet      Preferred Pharmacy with phone number: [id number] - [place] - [address] [facility] [address]  [address] [address] [address]  Phone: [phone] Fax: [phone]    Local or Mail Order: local     Ordering Provider: Melina     Would the patient rather a call back or a response via [facility]? Call     Best Call Back Number: .[phone]

## 2022-04-08 ENCOUNTER — PATIENT MESSAGE (OUTPATIENT)
Dept: ENDOCRINOLOGY | Facility: CLINIC | Age: 52
End: 2022-04-08
Payer: COMMERCIAL

## 2022-04-08 DIAGNOSIS — Z79.4 TYPE 2 DIABETES MELLITUS WITH DIABETIC POLYNEUROPATHY, WITH LONG-TERM CURRENT USE OF INSULIN: ICD-10-CM

## 2022-04-08 DIAGNOSIS — I10 ESSENTIAL HYPERTENSION: Primary | ICD-10-CM

## 2022-04-08 DIAGNOSIS — E11.42 TYPE 2 DIABETES MELLITUS WITH DIABETIC POLYNEUROPATHY, WITH LONG-TERM CURRENT USE OF INSULIN: ICD-10-CM

## 2022-04-08 LAB
COMMENTS: ABNORMAL
EST. AVERAGE GLUCOSE BLD GHB EST-MCNC: 220 MG/DL
HBA1C MFR BLD: 9.3 % (ref 4.8–5.6)

## 2022-04-11 RX ORDER — LOSARTAN POTASSIUM 100 MG/1
100 TABLET ORAL DAILY
Qty: 90 TABLET | Refills: 3 | OUTPATIENT
Start: 2022-04-11

## 2022-04-11 RX ORDER — PIOGLITAZONEHYDROCHLORIDE 30 MG/1
30 TABLET ORAL DAILY
Qty: 30 TABLET | Refills: 0 | OUTPATIENT
Start: 2022-04-11 | End: 2023-04-11

## 2022-04-11 RX ORDER — PIOGLITAZONEHYDROCHLORIDE 30 MG/1
30 TABLET ORAL DAILY
Qty: 30 TABLET | Refills: 0 | Status: SHIPPED | OUTPATIENT
Start: 2022-04-11 | End: 2022-04-12

## 2022-04-11 RX ORDER — LOSARTAN POTASSIUM 100 MG/1
100 TABLET ORAL DAILY
Qty: 90 TABLET | Refills: 3 | Status: SHIPPED | OUTPATIENT
Start: 2022-04-11 | End: 2023-04-18 | Stop reason: SDUPTHER

## 2022-04-11 RX ORDER — INSULIN DEGLUDEC 200 U/ML
INJECTION, SOLUTION SUBCUTANEOUS
Qty: 6 PEN | Refills: 0 | Status: SHIPPED | OUTPATIENT
Start: 2022-04-11 | End: 2022-04-12 | Stop reason: SDUPTHER

## 2022-04-11 RX ORDER — INSULIN DEGLUDEC 200 U/ML
INJECTION, SOLUTION SUBCUTANEOUS
Qty: 6 PEN | Refills: 0 | OUTPATIENT
Start: 2022-04-11

## 2022-04-12 ENCOUNTER — OFFICE VISIT (OUTPATIENT)
Dept: ENDOCRINOLOGY | Facility: CLINIC | Age: 52
End: 2022-04-12
Payer: COMMERCIAL

## 2022-04-12 VITALS
TEMPERATURE: 98 F | SYSTOLIC BLOOD PRESSURE: 159 MMHG | BODY MASS INDEX: 34.86 KG/M2 | WEIGHT: 286.38 LBS | DIASTOLIC BLOOD PRESSURE: 86 MMHG | HEART RATE: 70 BPM

## 2022-04-12 DIAGNOSIS — Z79.4 TYPE 2 DIABETES MELLITUS WITH DIABETIC POLYNEUROPATHY, WITH LONG-TERM CURRENT USE OF INSULIN: Primary | ICD-10-CM

## 2022-04-12 DIAGNOSIS — I10 ESSENTIAL HYPERTENSION: ICD-10-CM

## 2022-04-12 DIAGNOSIS — E11.42 TYPE 2 DIABETES MELLITUS WITH DIABETIC POLYNEUROPATHY, WITH LONG-TERM CURRENT USE OF INSULIN: Primary | ICD-10-CM

## 2022-04-12 DIAGNOSIS — E78.1 HYPERTRIGLYCERIDEMIA: ICD-10-CM

## 2022-04-12 PROCEDURE — 99214 OFFICE O/P EST MOD 30 MIN: CPT | Mod: S$GLB,,, | Performed by: NURSE PRACTITIONER

## 2022-04-12 PROCEDURE — 99999 PR PBB SHADOW E&M-EST. PATIENT-LVL IV: ICD-10-PCS | Mod: PBBFAC,,, | Performed by: NURSE PRACTITIONER

## 2022-04-12 PROCEDURE — 99999 PR PBB SHADOW E&M-EST. PATIENT-LVL IV: CPT | Mod: PBBFAC,,, | Performed by: NURSE PRACTITIONER

## 2022-04-12 PROCEDURE — 99214 PR OFFICE/OUTPT VISIT, EST, LEVL IV, 30-39 MIN: ICD-10-PCS | Mod: S$GLB,,, | Performed by: NURSE PRACTITIONER

## 2022-04-12 RX ORDER — BLOOD-GLUCOSE TRANSMITTER
EACH MISCELLANEOUS
Qty: 1 EACH | Refills: 3 | Status: SHIPPED | OUTPATIENT
Start: 2022-04-12 | End: 2023-01-09 | Stop reason: SDUPTHER

## 2022-04-12 RX ORDER — PIOGLITAZONEHYDROCHLORIDE 15 MG/1
15 TABLET ORAL DAILY
Qty: 90 TABLET | Refills: 0 | Status: SHIPPED | OUTPATIENT
Start: 2022-04-12 | End: 2022-07-22

## 2022-04-12 RX ORDER — INSULIN DEGLUDEC 200 U/ML
INJECTION, SOLUTION SUBCUTANEOUS
Qty: 6 PEN | Refills: 5 | Status: SHIPPED | OUTPATIENT
Start: 2022-04-12 | End: 2022-10-03

## 2022-04-12 RX ORDER — INSULIN LISPRO 100 [IU]/ML
INJECTION, SOLUTION INTRAVENOUS; SUBCUTANEOUS
Qty: 45 ML | Refills: 5 | Status: SHIPPED | OUTPATIENT
Start: 2022-04-12 | End: 2022-11-07

## 2022-04-12 RX ORDER — BLOOD-GLUCOSE SENSOR
EACH MISCELLANEOUS
Qty: 3 EACH | Refills: 12 | Status: SHIPPED | OUTPATIENT
Start: 2022-04-12 | End: 2023-01-09 | Stop reason: SDUPTHER

## 2022-04-12 RX ORDER — DULAGLUTIDE 0.75 MG/.5ML
0.75 INJECTION, SOLUTION SUBCUTANEOUS
Qty: 4 PEN | Refills: 3 | Status: SHIPPED | OUTPATIENT
Start: 2022-04-12 | End: 2023-01-09 | Stop reason: SINTOL

## 2022-04-12 NOTE — PATIENT INSTRUCTIONS
Unable to optimize insulin doses since no glucose data is available.   Continue Tresiba 60 units twice daily and Humalog 40 units before meals.   Change pen needles after each injection.   Improve insulin adherence - listen to your alarms.   Alarm set to restock insulin pen in work bag weekly.   Start Trulicity 0.75 mg weekly.   Patient open to starting pioglitazone 15 mg once daily (ok to break 30 mg tablets in half).   Dexcom sample provided. Prescription sent as well to pharmacy.   Improve diet.   Return to clinic in 3 months with labs prior.

## 2022-04-12 NOTE — PROGRESS NOTES
CC: This 51 y.o. White male  is here for evaluation of  T2DM along with comorbidities indicated in the Visit Diagnosis section of this encounter.    HPI: Lopez Hicks was diagnosed with T2DM in early 20s, diagnosed upon routine labs.         Prior visit 2/1/21  Pt has not been seen for several months.   a1c is mildly lower from 10.1 in July to 9.6%.   Pt was switched to Ozempic several months ago but was unable to tolerate it d/t severe heartburn.   C/o swelling to his feet by the end of the day. Stays on his feet all day.   Believes his biggest problem is diet. Likes to eat even when he's not hungry, especially at work when he's not busy.   He did resume Humalog at 30 units ac but often forgets before lunch.   Plan Discussed the need for regular follow-up and communication with clinic.   Set an alarm to take Humalog before lunch.   Try Skin Tac and/or Simpatch to help with Kayleigh sensor adhesion. You can find them on [employer].   Start Trulicity 0.75 mg once weekly for 4 weeks. Then increase to Trulicity 1.5 mg once weekly.    To avoid nausea, try to eat small portions that are not greasy. And do not go long hours without eating.    Continue metformin and insulin doses.   Hopefully you won't need as much insulin with improvement in diet and addition of Trulicity.   Increase physical activity.   Improve diet - discussed bringing healthy snacks and considering purchasing prepared meals.   Monitor glucoses at least every 8 hours or 4x/day before meals and bedtime with Kayleigh. If not using Kayleigh, make sure to to fingersticks.   Change pen needle after every use.   Follow up in 2-3 months with labs prior. Pt will call at later to schedule lab and follow up appointment.         Prior visit 8/2021  Last a1c was 9.9% in July.   He has an infection to left big toe.   Patient unable to tolerate Trulicity d/t nausea.   He is not using kayleigh sensors because he knocks them off even with the Simpatch.   He is injecting  Humalog in the AM without eating breakfast.   Admits that his diet is poor - likes to eat big portions of starch.   Plan Reviewed insulin injection sites. Needs to change out pen needle before each injection.   Increase Humalog from 30 to 40 units before each meal.   Make sure to carry Humalog along if eating out. Do not inject if not eating.   Improve diet. - monitor portions of food. He declines a visit with dietician.   Start pioglitazone 30 mg once daily (reviewed side effects/risk of weight gain, swelling, bladder cancer).   Test glucose 4x/day before meals and bedtime with either Dexcom CGM if covered by insurance or fingersticks.   Dexcom sample provided. Prescription also sent, will likely require prior authroization. Unsure if it would be approved or not.   Return to clinic in 4 weeks.         Interval history  Pt has not been seen for several months. a1c is down from 9.9% in July to now 9.1%.   He did not start pioglitazone, concerned re: s/e of swelling.   He is interested in taking Trulicity again, had stopped it previously d/t nausea.   He lost 2 friends recently from heart attack. Pt states he wants to improve diet and glucose control.     LAST DIABETES EDUCATION:     HOSPITALIZED FOR DIABETES -  No.     DIABETES MEDICATIONS:   Tresiba 60 units bid  Humalog 40 units ac  Pioglitazone 30 mg once daily   metformin XR 1000 mg bid      Misses medication doses - no pioglitazone as above,    forgets Humalog 3-4 times a week, usually before lunch. Forgets to carry it with him when he goes out.      Changes needle only once a day.   Injecting to upper abdomen.   Alarms have helped with insulin adherence.       DM COMPLICATIONS: peripheral neuropathy    SIGNIFICANT DIABETES MED HISTORY  Trulicity - nausea.   Ozempic - heartburn    SELF MONITORING BLOOD GLUCOSE: Checks blood glucose at home - none, interested in starting Dexcom. Did not use sample from last visit.        HYPOGLYCEMIC EPISODES: denies symptoms  recently. Has tested in the past - down to 70-80s.      CURRENT DIET: drinks unsweet tea and lemonade with AS. No soft drinks. Eats 2-3 meals/day, sometimes skips breakfast.    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 Daughter is vegan.       BP (!) 159/86   Pulse 70   Temp 98 °F (36.7 °C)   Wt 129.9 kg (286 lb 6.4 oz)   BMI 34.86 kg/m²       ROS:   CONSTITUTIONAL: Appetite good, denies fatigue  CV: + intermittent edema to ble       PHYSICAL EXAM:  GENERAL: Well developed, well nourished. No acute distress.   PSYCH: AAOx3, appropriate mood and affect, conversant, well-groomed. Judgement and insight good.   NEURO: Cranial nerves grossly intact. Speech clear, no tremor.   CHEST: Respirations even and unlabored.   MS: Gait steady. No clubbing.         Hemoglobin A1C   Date Value Ref Range Status   04/08/2022 9.1 (H) 4.0 - 5.6 % Final     Comment:     ADA Screening Guidelines:  5.7-6.4%  Consistent with prediabetes  >or=6.5%  Consistent with diabetes    High levels of fetal hemoglobin interfere with the HbA1C  assay. Heterozygous hemoglobin variants (HbS, HgC, etc)do  not significantly interfere with this assay.   However, presence of multiple variants may affect accuracy.     04/08/2022 9.1 (H) 4.0 - 5.6 % Final     Comment:     ADA Screening Guidelines:  5.7-6.4%  Consistent with prediabetes  >or=6.5%  Consistent with diabetes    High levels of fetal hemoglobin interfere with the HbA1C  assay. Heterozygous hemoglobin variants (HbS, HgC, etc)do  not significantly interfere with this assay.   However, presence of multiple variants may affect accuracy.     07/15/2021 9.9 (H) 4.0 - 5.6 % Final     Comment:     ADA Screening Guidelines:  5.7-6.4%  Consistent with prediabetes  >or=6.5%  Consistent with diabetes    High levels of fetal hemoglobin interfere with the  HbA1C  assay. Heterozygous hemoglobin variants (HbS, HgC, etc)do  not significantly interfere with this assay.   However, presence of multiple variants may affect accuracy.             Chemistry        Component Value Date/Time     04/08/2022 0723    K 3.7 04/08/2022 0723     04/08/2022 0723    CO2 27 04/08/2022 0723    BUN 18 04/08/2022 0723    CREATININE 1.19 04/08/2022 0723     (H) 04/08/2022 0723        Component Value Date/Time    CALCIUM 9.0 04/08/2022 0723    ALKPHOS 54 04/08/2022 0723    AST 36 04/08/2022 0723    ALT 35 04/08/2022 0723    BILITOT 0.3 04/08/2022 0723    ESTGFRAFRICA >60.0 04/08/2022 0723    EGFRNONAA >60.0 04/08/2022 0723          Lab Results   Component Value Date    LDLCALC Invalid, Trig>400.0 04/08/2022    LDLCALC Invalid, Trig>400.0 04/08/2022        Latest Reference Range & Units 04/08/22 07:23   Cholesterol 120 - 199 mg/dL  120 - 199 mg/dL 143 [1]  143 [2]   HDL 40 - 75 mg/dL  40 - 75 mg/dL 31 (L) [3]  31 (L) [4]   HDL/Cholesterol Ratio 20.0 - 50.0 %  20.0 - 50.0 % 21.7  21.7   LDL Cholesterol External 63.0 - 159.0 mg/dL  63.0 - 159.0 mg/dL Invalid, Trig>400.0 [5]  Invalid, Trig>400.0 [6]   Non-HDL Cholesterol mg/dL 112 [7]  112 [8]   Total Cholesterol/HDL Ratio 2.0 - 5.0   2.0 - 5.0  4.6  4.6   Triglycerides 30 - 150 mg/dL  30 - 150 mg/dL 463 (H) [9]  463 (H) [10]       Lab Results   Component Value Date    MICALBCREAT 88.0 (H) 04/08/2022         ASSESSMENT and PLAN:    A1C GOAL: < 7 %       1. Type 2 diabetes mellitus with diabetic polyneuropathy, with long-term current use of insulin  Unable to optimize insulin doses since no glucose data is available.   Continue Tresiba 60 units twice daily and Humalog 40 units before meals.   Change pen needles after each injection.   Improve insulin adherence - listen to your alarms.   Alarm set to restock insulin pen in work bag weekly.   Start Trulicity 0.75 mg weekly.   Patient open to starting pioglitazone 15 mg once daily  (ok to break 30 mg tablets in half).   Dexcom sample provided. Prescription sent as well to pharmacy.   Improve diet.   Return to clinic in 3 months with labs prior.     insulin lispro (HUMALOG KWIKPEN INSULIN) 100 unit/mL pen    insulin degludec (TRESIBA FLEXTOUCH U-200) 200 unit/mL (3 mL) insulin pen    Hemoglobin A1C   2. Essential hypertension  F/u with PCP    3. Hypertriglyceridemia  Lipid Panel          Orders Placed This Encounter   Procedures    Hemoglobin A1C     Standing Status:   Future     Standing Expiration Date:   6/11/2023    Lipid Panel     Standing Status:   Future     Standing Expiration Date:   4/12/2023        Follow up in about 3 months (around 7/12/2022).

## 2022-05-26 ENCOUNTER — PATIENT MESSAGE (OUTPATIENT)
Dept: ENDOCRINOLOGY | Facility: CLINIC | Age: 52
End: 2022-05-26
Payer: COMMERCIAL

## 2022-05-26 DIAGNOSIS — Z79.4 TYPE 2 DIABETES MELLITUS WITH DIABETIC POLYNEUROPATHY, WITH LONG-TERM CURRENT USE OF INSULIN: ICD-10-CM

## 2022-05-26 DIAGNOSIS — E11.42 TYPE 2 DIABETES MELLITUS WITH DIABETIC POLYNEUROPATHY, WITH LONG-TERM CURRENT USE OF INSULIN: ICD-10-CM

## 2022-05-26 RX ORDER — METFORMIN HYDROCHLORIDE 500 MG/1
1000 TABLET, EXTENDED RELEASE ORAL 2 TIMES DAILY WITH MEALS
Qty: 360 TABLET | Refills: 2 | Status: SHIPPED | OUTPATIENT
Start: 2022-05-26 | End: 2022-08-29

## 2022-06-06 ENCOUNTER — PATIENT MESSAGE (OUTPATIENT)
Dept: INTERNAL MEDICINE | Facility: CLINIC | Age: 52
End: 2022-06-06
Payer: COMMERCIAL

## 2022-06-06 DIAGNOSIS — U07.1 COVID-19 VIRUS INFECTION: Primary | ICD-10-CM

## 2022-06-08 RX ORDER — ATENOLOL 100 MG/1
TABLET ORAL
Qty: 90 TABLET | Refills: 3 | Status: SHIPPED | OUTPATIENT
Start: 2022-06-08 | End: 2023-06-16

## 2022-06-08 NOTE — TELEPHONE ENCOUNTER
Care Due:                  Date            Visit Type   Department     Provider  --------------------------------------------------------------------------------                                SAME DAY -                              ESTABLISHED   [facility] INTERNAL  Last Visit: 07-      PATIENT      MEDICINE       Sukumar Mackey  Next Visit: None Scheduled  None         None Found                                                            Last  Test          Frequency    Reason                     Performed    Due Date  --------------------------------------------------------------------------------    Office Visit  12 months..  buPROPion, fenofibrate,    07- 07-                             hydroCHLOROthiazide,                             losartan, omeprazole,                             pravastatin..............    [employer] Embedded Care Gaps. Reference number: [id number]. 6/08/2022   8:36:36 AM CDT

## 2022-06-08 NOTE — TELEPHONE ENCOUNTER
Refill Routing Note   Medication(s) are not appropriate for processing by [facility] for the following reason(s):      - Required vitals are abnormal    [facility] action(s):  Defer          Medication reconciliation completed: No     Appointments  past 12m or future 3m with PCP    Date Provider   Last Visit   7/12/2021 Sukumar Mackey DO   Next Visit   Visit date not found Sukumar Mackey DO   ED visits in past 90 days: 0        Note composed:8:49 AM 06/08/2022

## 2022-06-27 ENCOUNTER — TELEPHONE (OUTPATIENT)
Dept: SPORTS MEDICINE | Facility: CLINIC | Age: 52
End: 2022-06-27
Payer: COMMERCIAL

## 2022-06-27 DIAGNOSIS — M25.561 PAIN IN BOTH KNEES, UNSPECIFIED CHRONICITY: Primary | ICD-10-CM

## 2022-06-27 DIAGNOSIS — M25.562 PAIN IN BOTH KNEES, UNSPECIFIED CHRONICITY: Primary | ICD-10-CM

## 2022-06-28 ENCOUNTER — OFFICE VISIT (OUTPATIENT)
Dept: SPORTS MEDICINE | Facility: CLINIC | Age: 52
End: 2022-06-28
Payer: COMMERCIAL

## 2022-06-28 VITALS — HEIGHT: 76 IN | TEMPERATURE: 98 F | BODY MASS INDEX: 34.86 KG/M2 | WEIGHT: 286.25 LBS

## 2022-06-28 DIAGNOSIS — M25.462 EFFUSION OF LEFT KNEE JOINT: Primary | ICD-10-CM

## 2022-06-28 PROCEDURE — 99214 OFFICE O/P EST MOD 30 MIN: CPT | Mod: 25,S$GLB,, | Performed by: STUDENT IN AN ORGANIZED HEALTH CARE EDUCATION/TRAINING PROGRAM

## 2022-06-28 PROCEDURE — 20610 DRAIN/INJ JOINT/BURSA W/O US: CPT | Mod: LT,S$GLB,, | Performed by: STUDENT IN AN ORGANIZED HEALTH CARE EDUCATION/TRAINING PROGRAM

## 2022-06-28 PROCEDURE — 99999 PR PBB SHADOW E&M-EST. PATIENT-LVL IV: ICD-10-PCS | Mod: PBBFAC,,, | Performed by: STUDENT IN AN ORGANIZED HEALTH CARE EDUCATION/TRAINING PROGRAM

## 2022-06-28 PROCEDURE — 20610 LARGE JOINT ASPIRATION/INJECTION: L KNEE: ICD-10-PCS | Mod: LT,S$GLB,, | Performed by: STUDENT IN AN ORGANIZED HEALTH CARE EDUCATION/TRAINING PROGRAM

## 2022-06-28 PROCEDURE — 99999 PR PBB SHADOW E&M-EST. PATIENT-LVL IV: CPT | Mod: PBBFAC,,, | Performed by: STUDENT IN AN ORGANIZED HEALTH CARE EDUCATION/TRAINING PROGRAM

## 2022-06-28 PROCEDURE — 99214 PR OFFICE/OUTPT VISIT, EST, LEVL IV, 30-39 MIN: ICD-10-PCS | Mod: 25,S$GLB,, | Performed by: STUDENT IN AN ORGANIZED HEALTH CARE EDUCATION/TRAINING PROGRAM

## 2022-06-28 NOTE — PROCEDURES
Large Joint Aspiration/Injection: L knee    Date/Time: 6/28/2022 1:30 PM  Performed by: Sukumar Agosto MD  Authorized by: Sukumar Agosto MD     Consent Done?:  Yes (Verbal)  Indications:  Diagnostic evaluation and pain  Site marked: the procedure site was marked    Timeout: prior to procedure the correct patient, procedure, and site was verified    Prep: patient was prepped and draped in usual sterile fashion      Local anesthesia used?: Yes    Local anesthetic:  Lidocaine spray  Anesthetic total (ml):  4      Details:  Needle Size:  22 G  Ultrasonic Guidance for needle placement?: No    Approach:  Anteromedial  Location:  Knee  Site:  L knee  Aspirate amount (mL):  100  Aspirate:  Yellow  Patient tolerance:  Patient tolerated the procedure well with no immediate complications

## 2022-06-28 NOTE — PROGRESS NOTES
Subjective:          Chief Complaint: Lopez Hicks is a 52 y.o. male who had concerns including Pain of the Left Knee.    Lopez Hicks is a 52 y.o. male presents for evaluation of left knee pain.  He was last seen by me about 4 and half months ago for a partial PCL injury, grade 2.  He had near 100% resolution of his pain and instability after course of physical therapy and transition to a home exercise program.  He did great with this.  Two days ago he had a twisting/turning injury to his left knee.  Denies hearing or feeling a pop.  The knee became swollen fairly quickly.  He now alternate between intense spurs of pain where he cannot bear weight verses mild aches.  He has noted steady crease range of motion and fullness sensation in his knee but denies any mechanical symptoms such as catching or locking.  Denies any instability.  The pain is located along the medial joint line and does not radiate.  Denies any numbness or paresthesias to the left lower extremity.    Follow-up  Associated symptoms include joint swelling. Pertinent negatives include no myalgias or neck pain.   Pain  Associated symptoms include joint swelling. Pertinent negatives include no myalgias or neck pain.     Past Medical History:   Diagnosis Date    Diabetes mellitus, type 2     GERD (gastroesophageal reflux disease)     Hyperlipidemia     Hyperthyroidism     Mild nonproliferative diabetic retinopathy 2015       Current Outpatient Medications on File Prior to Visit   Medication Sig Dispense Refill    amLODIPine (NORVASC) 10 MG tablet TAKE 1 TABLET BY MOUTH ONCE DAILY 90 tablet 3    aspirin (ECOTRIN) 81 MG EC tablet Take 81 mg by mouth once daily.      atenoloL (TENORMIN) 100 MG tablet TAKE 1 TABLET BY MOUTH ONCE DAILY 90 tablet 3    blood sugar diagnostic Strp Check blood glucose 4 times a day - before meals and at bedtime. 400 strip 3    blood-glucose sensor (DEXCOM G6 SENSOR) Stephanie Change sensor every 10 days 3  "each 12    blood-glucose transmitter (DEXCOM G6 TRANSMITTER) Stephanie Change every 3 months 1 each 3    buPROPion (WELLBUTRIN SR) 200 MG SR12 TAKE ONE TABLET BY MOUTH 2 TIMES A  tablet 3    clindamycin (CLEOCIN) 300 MG capsule TAKE ONE CAPSULE BY MOUTH EVERY EIGHT HOURS 30 capsule 0    diabetic supplies, miscellan. Kit Please change sensor every 14 days. FreeStyle Kayleigh Sensor 30-day supply (2 sensors/month) 2 kit 11    dulaglutide (TRULICITY) 0.75 mg/0.5 mL pen injector Inject 0.75 mg into the skin every 7 days. 4 pen 3    fenofibrate (TRICOR) 145 MG tablet TAKE 1 TABLET BY MOUTH ONCE DAILY 90 tablet 3    hydroCHLOROthiazide (HYDRODIURIL) 25 MG tablet TAKE 1 TABLET BY MOUTH ONCE DAILY 90 tablet 1    insulin degludec (TRESIBA FLEXTOUCH U-200) 200 unit/mL (3 mL) insulin pen Inject 60 units twice daily 6 pen 5    insulin lispro (HUMALOG KWIKPEN INSULIN) 100 unit/mL pen INJECT 40 UNITS SUBCUTANEOUSLY BEFORE MEALS. 45 mL 5    losartan (COZAAR) 100 MG tablet Take 1 tablet (100 mg total) by mouth once daily. 90 tablet 3    metFORMIN (GLUCOPHAGE-XR) 500 MG ER 24hr tablet Take 2 tablets (1,000 mg total) by mouth 2 (two) times daily with meals. 360 tablet 2    multivitamin (THERAGRAN) per tablet Take 1 tablet by mouth once daily.      omeprazole (PRILOSEC) 40 MG capsule TAKE 1 CAPSULE BY MOUTH ONCE DAILY 90 capsule 3    ONETOUCH DELICA LANCETS 33 gauge Misc       pen needle, diabetic (BD TENA 2ND GEN PEN NEEDLE) 32 gauge x 5/32" Ndle USE ONE NEEDLE FOR INJECTION FOUR TIMES DAILY 400 each 3    pioglitazone (ACTOS) 15 MG tablet Take 1 tablet (15 mg total) by mouth once daily. 90 tablet 0    pravastatin (PRAVACHOL) 40 MG tablet TAKE 1 TABLET BY MOUTH ONCE DAILY 90 tablet 3    sildenafil (VIAGRA) 100 MG tablet       vitamin D (VITAMIN D3) 1000 units Tab Take 1,000 Units by mouth once daily.       No current facility-administered medications on file prior to visit.       Past Surgical History:   Procedure " Laterality Date    CHOLECYSTECTOMY      LUNG LOBECTOMY Right     after severe pneumonia    NASAL/SINUS ENDOSCOPY      ROTATOR CUFF REPAIR Bilateral     Dr. JERRY Cardona, and Dr. Jung.       Family History   Problem Relation Age of Onset    Hypertension Mother     Diabetes Father     Hyperlipidemia Father     Hypertension Father     Heart disease Father     Prostate cancer Father 60        radioactive seed    Diabetes Paternal Grandmother        Social History     Socioeconomic History    Marital status:     Number of children: 3   Occupational History    Occupation:    Tobacco Use    Smoking status: Never Smoker    Smokeless tobacco: Never Used   Substance and Sexual Activity    Alcohol use: Yes     Alcohol/week: 0.0 standard drinks     Comment: beer once every 3 months    Drug use: No    Sexual activity: Yes     Partners: Female   Social History Narrative     and lives with wife and twin daughters.  Works at [employer].  Enjoys hunting and fishing.  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        Review of Systems   Constitutional: Negative.   HENT: Negative.    Eyes: Negative.    Cardiovascular: Negative.    Respiratory: Negative.    Endocrine: Negative.    Hematologic/Lymphatic: Negative.    Skin: Negative.    Musculoskeletal: Positive for joint swelling and stiffness. Negative for arthritis, back pain, falls, joint pain, muscle cramps, muscle weakness, myalgias and neck pain.   Neurological: Negative.    Psychiatric/Behavioral: Negative.    Allergic/Immunologic: Negative.                    Objective:        General: Lopez is well-developed, well-nourished, appears stated age, in no acute distress, alert and oriented to time, place and person.     General    Nursing note and vitals reviewed.  Constitutional: He is oriented to person, place, and time. He appears well-developed and well-nourished. No distress.   HENT:   Head: Normocephalic and  atraumatic.   Nose: Nose normal.   Eyes: EOM are normal.   Cardiovascular: Normal rate and intact distal pulses.    Pulmonary/Chest: Effort normal. No respiratory distress.   Neurological: He is alert and oriented to person, place, and time.   Psychiatric: He has a normal mood and affect. His behavior is normal. Judgment and thought content normal.     General Musculoskeletal Exam   Gait: abnormal and antalgic       Right Knee Exam     Inspection   Erythema: absent  Scars: absent  Swelling: absent  Effusion: absent  Deformity: absent  Bruising: absent    Tenderness   The patient is experiencing no tenderness.     Range of Motion   Extension: -5   Flexion: 140     Tests   Meniscus   Mary:  Medial - negative Lateral - negative  Ligament Examination Lachman: normal (-1 to 2mm) PCL-Posterior Drawer: normal (0 to 2mm)     MCL - Valgus: normal (0 to 2mm)  LCL - Varus: normal  Patella   Passive Patellar Tilt: neutral  Patellar Tracking: normal    Other   Sensation: normal    Left Knee Exam     Inspection   Erythema: absent  Scars: absent  Swelling: present  Effusion: present  Deformity: absent  Bruising: absent    Tenderness   The patient tender to palpation of the medial joint line (Posterior joint line).    Range of Motion   Extension: 0   Flexion: 110     Tests   Meniscus   Mary:  Medial - positive (Pain only) Lateral - negative  Stability Lachman: normal (-1 to 2mm)   PCL-Posterior Drawer: abnormal - grade I  MCL - Valgus: normal (0 to 2mm)  LCL - Varus: normal (0 to 2mm)  Posterior Sag Test: negative  Posterolateral Corner: stable  Patella   Passive Patellar Tilt: neutral  Patellar Tracking: normal    Other   Sensation: normal    Muscle Strength   Right Lower Extremity   Quadriceps:  5/5   Hamstrin/5   Left Lower Extremity   Quadriceps:  5/5   Hamstrin/5     Vascular Exam     Right Pulses  Dorsalis Pedis:      2+  Posterior Tibial:      2+        Left Pulses  Dorsalis Pedis:      2+  Posterior  Tibial:      2+          Imaging:  X-rays of the left knee from 12/20/2021 personally reviewed by me 01/04/2022.  These include weight-bearing AP, lateral, and Merchant views.  Joint spaces are well preserved in all 3 compartments.    MRI of the left knee from 12/23/2021 personally reviewed by me 01/04/2022.  There is a high-grade to tearing of the distal aspect of the posterior cruciate ligament.  There is a joint effusion.  The ACL and collateral ligaments are intact.  Mediolateral meniscus are intact.  There is grade 2-3 changes in the cartilage of the medial compartment and patellofemoral compartment, lateral compartment is unremarkable.    X-rays of bilateral knees from 06/28/2022 personally reviewed by me on that day.  Weight-bearing AP, PA flexion, lateral, and Merchant views.  There is an effusion on the left knee.  There are moderate arthritic changes in all 3 compartments on the right with osteophyte formation.  On the left knee, the joint spaces are well preserved with no osteophyte formation.        Assessment:     Lopez Hicks is a 52 y.o. male with history of left grade 2 PCL injury now with acute effusion to the knee after a twisting type injury.  Encounter Diagnosis   Name Primary?    Effusion of left knee joint Yes          Plan:       The knee was aspirated in clinic with 100 cc of straw-colored joint fluid.  Given his large effusion and stiffness, we will obtain an MRI of the knee to evaluate the meniscus.  He will return to clinic afterwards to discuss the findings and potential treatment options.    All of their questions were answered.  They will call the clinic with any questions or concerns in the interim.    Should the patient's symptoms worsen, persist, or fail to improve they should return for reevaluation and I would be happy to see them back anytime.        Sukumar Agosto M.D.     Please be aware that this note has been generated with the assistance of [employer] voice-to-text.   Please excuse any spelling or grammatical errors.    Thank you for choosing Dr. Sukumar Agosto for your sports medicine care. It is our goal to provide you with exceptional care that will help keep you healthy, active, and get you back in the game.     If you felt that you received exemplary care today, please consider leaving feedback for Dr. Agosto on [employer] at [email or web address].    Please do not hesitate to reach out to us via email, phone, or MyChart with any questions, concerns, or feedback.

## 2022-07-12 ENCOUNTER — PATIENT MESSAGE (OUTPATIENT)
Dept: ADMINISTRATIVE | Facility: HOSPITAL | Age: 52
End: 2022-07-12
Payer: COMMERCIAL

## 2022-07-18 ENCOUNTER — HOSPITAL ENCOUNTER (OUTPATIENT)
Dept: RADIOLOGY | Facility: HOSPITAL | Age: 52
Discharge: HOME | End: 2022-07-18
Attending: STUDENT IN AN ORGANIZED HEALTH CARE EDUCATION/TRAINING PROGRAM
Payer: COMMERCIAL

## 2022-07-18 ENCOUNTER — OFFICE VISIT (OUTPATIENT)
Dept: SPORTS MEDICINE | Facility: CLINIC | Age: 52
End: 2022-07-18
Payer: COMMERCIAL

## 2022-07-18 VITALS
SYSTOLIC BLOOD PRESSURE: 142 MMHG | WEIGHT: 291.69 LBS | HEIGHT: 76 IN | DIASTOLIC BLOOD PRESSURE: 75 MMHG | HEART RATE: 76 BPM | BODY MASS INDEX: 35.52 KG/M2

## 2022-07-18 DIAGNOSIS — S83.232A COMPLEX TEAR OF MEDIAL MENISCUS OF LEFT KNEE AS CURRENT INJURY, INITIAL ENCOUNTER: Primary | ICD-10-CM

## 2022-07-18 DIAGNOSIS — M25.462 EFFUSION OF LEFT KNEE JOINT: ICD-10-CM

## 2022-07-18 PROCEDURE — 73721 MRI KNEE WITHOUT CONTRAST LEFT: ICD-10-PCS | Mod: 26,LT,, | Performed by: RADIOLOGY

## 2022-07-18 PROCEDURE — 99999 PR PBB SHADOW E&M-EST. PATIENT-LVL V: ICD-10-PCS | Mod: PBBFAC,,, | Performed by: STUDENT IN AN ORGANIZED HEALTH CARE EDUCATION/TRAINING PROGRAM

## 2022-07-18 PROCEDURE — 73721 MRI JNT OF LWR EXTRE W/O DYE: CPT | Mod: TC,LT

## 2022-07-18 PROCEDURE — 99214 PR OFFICE/OUTPT VISIT, EST, LEVL IV, 30-39 MIN: ICD-10-PCS | Mod: 25,S$GLB,, | Performed by: STUDENT IN AN ORGANIZED HEALTH CARE EDUCATION/TRAINING PROGRAM

## 2022-07-18 PROCEDURE — 20610 DRAIN/INJ JOINT/BURSA W/O US: CPT | Mod: LT,S$GLB,, | Performed by: STUDENT IN AN ORGANIZED HEALTH CARE EDUCATION/TRAINING PROGRAM

## 2022-07-18 PROCEDURE — 20610 LARGE JOINT ASPIRATION/INJECTION: L KNEE: ICD-10-PCS | Mod: LT,S$GLB,, | Performed by: STUDENT IN AN ORGANIZED HEALTH CARE EDUCATION/TRAINING PROGRAM

## 2022-07-18 PROCEDURE — 99999 PR PBB SHADOW E&M-EST. PATIENT-LVL V: CPT | Mod: PBBFAC,,, | Performed by: STUDENT IN AN ORGANIZED HEALTH CARE EDUCATION/TRAINING PROGRAM

## 2022-07-18 PROCEDURE — 73721 MRI JNT OF LWR EXTRE W/O DYE: CPT | Mod: 26,LT,, | Performed by: RADIOLOGY

## 2022-07-18 PROCEDURE — 99214 OFFICE O/P EST MOD 30 MIN: CPT | Mod: 25,S$GLB,, | Performed by: STUDENT IN AN ORGANIZED HEALTH CARE EDUCATION/TRAINING PROGRAM

## 2022-07-18 RX ORDER — TRIAMCINOLONE ACETONIDE 40 MG/ML
80 INJECTION, SUSPENSION INTRA-ARTICULAR; INTRAMUSCULAR
Status: DISCONTINUED | OUTPATIENT
Start: 2022-07-18 | End: 2022-07-18 | Stop reason: HOSPADM

## 2022-07-18 RX ADMIN — TRIAMCINOLONE ACETONIDE 80 MG: 40 INJECTION, SUSPENSION INTRA-ARTICULAR; INTRAMUSCULAR at 11:07

## 2022-07-18 NOTE — PROCEDURES
Large Joint Aspiration/Injection: L knee    Date/Time: 7/18/2022 11:30 AM  Performed by: Sukumar Agosto MD  Authorized by: Skuumar Agosto MD     Consent Done?:  Yes (Verbal)  Indications:  Diagnostic evaluation and pain  Site marked: the procedure site was marked    Timeout: prior to procedure the correct patient, procedure, and site was verified    Prep: patient was prepped and draped in usual sterile fashion      Local anesthesia used?: Yes    Local anesthetic:  Lidocaine spray, lidocaine 2% without epinephrine and bupivacaine 0.25% without epinephrine  Anesthetic total (ml):  4      Details:  Needle Size:  22 G  Ultrasonic Guidance for needle placement?: No    Approach:  Anteromedial  Location:  Knee  Site:  L knee  Medications:  80 mg triamcinolone acetonide 40 mg/mL  Patient tolerance:  Patient tolerated the procedure well with no immediate complications

## 2022-07-18 NOTE — PROGRESS NOTES
Subjective:          Chief Complaint: Lopez Hicks is a 52 y.o. male who had concerns including Results of the Left Knee (MRI).    Lopez Hicks is a 52 y.o. male returns to clinic today for follow-up for his left knee.  He was last seen about 3 weeks for physical appointment MRI was obtained, see my detailed interpretation.  At the time knee was also aspirated.  This provided great relief his symptoms.  He now has near full motion of the knee.  Denies any mechanical symptoms still has medial joint line pain.  Denies any instability to the knee.  He is here today to discuss results and treatment options.    HPI 6/28/2022  Lopez Hicks is a 52 y.o. male presents for evaluation of left knee pain.  He was last seen by me about 4 and half months ago for a partial PCL injury, grade 2.  He had near 100% resolution of his pain and instability after course of physical therapy and transition to a home exercise program.  He did great with this.  Two days ago he had a twisting/turning injury to his left knee.  Denies hearing or feeling a pop.  The knee became swollen fairly quickly.  He now alternate between intense spurs of pain where he cannot bear weight verses mild aches.  He has noted steady crease range of motion and fullness sensation in his knee but denies any mechanical symptoms such as catching or locking.  Denies any instability.  The pain is located along the medial joint line and does not radiate.  Denies any numbness or paresthesias to the left lower extremity.    Pain  Pertinent negatives include no joint swelling, myalgias or neck pain.   Follow-up  Pertinent negatives include no joint swelling, myalgias or neck pain.     Past Medical History:   Diagnosis Date    Diabetes mellitus, type 2     GERD (gastroesophageal reflux disease)     Hyperlipidemia     Hyperthyroidism     Mild nonproliferative diabetic retinopathy 2015       Current Outpatient Medications on File Prior to Visit  "  Medication Sig Dispense Refill    amLODIPine (NORVASC) 10 MG tablet TAKE 1 TABLET BY MOUTH ONCE DAILY 90 tablet 3    aspirin (ECOTRIN) 81 MG EC tablet Take 81 mg by mouth once daily.      atenoloL (TENORMIN) 100 MG tablet TAKE 1 TABLET BY MOUTH ONCE DAILY 90 tablet 3    blood sugar diagnostic Strp Check blood glucose 4 times a day - before meals and at bedtime. 400 strip 3    blood-glucose sensor (DEXCOM G6 SENSOR) Stephanie Change sensor every 10 days 3 each 12    blood-glucose transmitter (DEXCOM G6 TRANSMITTER) Stephanie Change every 3 months 1 each 3    buPROPion (WELLBUTRIN SR) 200 MG SR12 TAKE ONE TABLET BY MOUTH 2 TIMES A  tablet 3    clindamycin (CLEOCIN) 300 MG capsule TAKE ONE CAPSULE BY MOUTH EVERY EIGHT HOURS 30 capsule 0    diabetic supplies, miscellan. Kit Please change sensor every 14 days. FreeStyle Kayleigh Sensor 30-day supply (2 sensors/month) 2 kit 11    dulaglutide (TRULICITY) 0.75 mg/0.5 mL pen injector Inject 0.75 mg into the skin every 7 days. 4 pen 3    fenofibrate (TRICOR) 145 MG tablet TAKE 1 TABLET BY MOUTH ONCE DAILY 90 tablet 3    hydroCHLOROthiazide (HYDRODIURIL) 25 MG tablet TAKE 1 TABLET BY MOUTH ONCE DAILY 90 tablet 1    insulin degludec (TRESIBA FLEXTOUCH U-200) 200 unit/mL (3 mL) insulin pen Inject 60 units twice daily 6 pen 5    insulin lispro (HUMALOG KWIKPEN INSULIN) 100 unit/mL pen INJECT 40 UNITS SUBCUTANEOUSLY BEFORE MEALS. 45 mL 5    losartan (COZAAR) 100 MG tablet Take 1 tablet (100 mg total) by mouth once daily. 90 tablet 3    metFORMIN (GLUCOPHAGE-XR) 500 MG ER 24hr tablet Take 2 tablets (1,000 mg total) by mouth 2 (two) times daily with meals. 360 tablet 2    multivitamin (THERAGRAN) per tablet Take 1 tablet by mouth once daily.      omeprazole (PRILOSEC) 40 MG capsule TAKE 1 CAPSULE BY MOUTH ONCE DAILY 90 capsule 3    ONETOUCH DELICA LANCETS 33 gauge Misc       pen needle, diabetic (BD TENA 2ND GEN PEN NEEDLE) 32 gauge x 5/32" Ndle USE ONE NEEDLE FOR " INJECTION FOUR TIMES DAILY 400 each 3    pioglitazone (ACTOS) 15 MG tablet Take 1 tablet (15 mg total) by mouth once daily. 90 tablet 0    pravastatin (PRAVACHOL) 40 MG tablet TAKE 1 TABLET BY MOUTH ONCE DAILY 90 tablet 3    sildenafil (VIAGRA) 100 MG tablet       vitamin D (VITAMIN D3) 1000 units Tab Take 1,000 Units by mouth once daily.       No current facility-administered medications on file prior to visit.       Past Surgical History:   Procedure Laterality Date    CHOLECYSTECTOMY      LUNG LOBECTOMY Right     after severe pneumonia    NASAL/SINUS ENDOSCOPY      ROTATOR CUFF REPAIR Bilateral     Dr. JERRY Cardona, and Dr. Jung.       Family History   Problem Relation Age of Onset    Hypertension Mother     Diabetes Father     Hyperlipidemia Father     Hypertension Father     Heart disease Father     Prostate cancer Father 60        radioactive seed    Diabetes Paternal Grandmother        Social History     Socioeconomic History    Marital status:     Number of children: 3   Occupational History    Occupation:    Tobacco Use    Smoking status: Never Smoker    Smokeless tobacco: Never Used   Substance and Sexual Activity    Alcohol use: Yes     Alcohol/week: 0.0 standard drinks     Comment: beer once every 3 months    Drug use: No    Sexual activity: Yes     Partners: Female   Social History Narrative     and lives with wife and twin daughters.  Works at [employer].  Enjoys hunting and fishing.  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        Review of Systems   Constitutional: Negative.   HENT: Negative.    Eyes: Negative.    Cardiovascular: Negative.    Respiratory: Negative.    Endocrine: Negative.    Hematologic/Lymphatic: Negative.    Skin: Negative.    Musculoskeletal: Positive for joint pain. Negative for arthritis, back pain, falls, joint swelling, muscle cramps, muscle weakness, myalgias, neck pain and stiffness.   Neurological: Negative.     Psychiatric/Behavioral: Negative.    Allergic/Immunologic: Negative.                    Objective:        General: Lopez is well-developed, well-nourished, appears stated age, in no acute distress, alert and oriented to time, place and person.     General    Nursing note and vitals reviewed.  Constitutional: He is oriented to person, place, and time. He appears well-developed and well-nourished. No distress.   HENT:   Head: Normocephalic and atraumatic.   Nose: Nose normal.   Eyes: EOM are normal.   Cardiovascular: Normal rate and intact distal pulses.    Pulmonary/Chest: Effort normal. No respiratory distress.   Neurological: He is alert and oriented to person, place, and time.   Psychiatric: He has a normal mood and affect. His behavior is normal. Judgment and thought content normal.     General Musculoskeletal Exam   Gait: abnormal and antalgic       Right Knee Exam     Inspection   Erythema: absent  Scars: absent  Swelling: absent  Effusion: absent  Deformity: absent  Bruising: absent    Tenderness   The patient is experiencing no tenderness.     Range of Motion   Extension: -5   Flexion: 140     Tests   Meniscus   Mary:  Medial - negative Lateral - negative  Ligament Examination Lachman: normal (-1 to 2mm) PCL-Posterior Drawer: normal (0 to 2mm)     MCL - Valgus: normal (0 to 2mm)  LCL - Varus: normal  Patella   Passive Patellar Tilt: neutral  Patellar Tracking: normal    Other   Sensation: normal    Left Knee Exam     Inspection   Erythema: absent  Scars: absent  Swelling: absent  Effusion: absent  Deformity: absent  Bruising: absent    Tenderness   The patient tender to palpation of the medial joint line (Posterior joint line).    Range of Motion   Extension: -5   Flexion: 130     Tests   Meniscus   Mary:  Medial - positive (Pain only) Lateral - negative  Stability Lachman: normal (-1 to 2mm)   PCL-Posterior Drawer: abnormal - grade I  MCL - Valgus: normal (0 to 2mm)  LCL - Varus: normal (0 to  2mm)  Posterior Sag Test: negative  Posterolateral Corner: stable  Patella   Passive Patellar Tilt: neutral  Patellar Tracking: normal    Other   Sensation: normal    Muscle Strength   Right Lower Extremity   Quadriceps:  5/5   Hamstrin/5   Left Lower Extremity   Quadriceps:  5/5   Hamstrin/5     Vascular Exam     Right Pulses  Dorsalis Pedis:      2+  Posterior Tibial:      2+        Left Pulses  Dorsalis Pedis:      2+  Posterior Tibial:      2+          Imaging:  X-rays of the left knee from 2021 personally reviewed by me 2022.  These include weight-bearing AP, lateral, and Merchant views.  Joint spaces are well preserved in all 3 compartments.    MRI of the left knee from 2021 personally reviewed by me 2022.  There is a high-grade to tearing of the distal aspect of the posterior cruciate ligament.  There is a joint effusion.  The ACL and collateral ligaments are intact.  Mediolateral meniscus are intact.  There is grade 2-3 changes in the cartilage of the medial compartment and patellofemoral compartment, lateral compartment is unremarkable.    X-rays of bilateral knees from 2022 personally reviewed by me on that day.  Weight-bearing AP, PA flexion, lateral, and Merchant views.  There is an effusion on the left knee.  There are moderate arthritic changes in all 3 compartments on the right with osteophyte formation.  On the left knee, the joint spaces are well preserved with no osteophyte formation.    MRI left knee from 2022 personally reviewed by me on that day.  There was a complex tear involving the posterior horn of the medial meniscus with a vertical and horizontal.  There is no extension into the posterior or anterior root.  The lateral meniscus is intact including at the posterior and anterior roots.  The ACL is intact.  There was laxity in the PCL consistent his previous injury.  Collateral ligaments are intact.  Overall the cartilage was preserved in all 3  compartments.        Assessment:     Lopez Hicks is a 52 y.o. male with history of left grade 2 PCL injury now complex tear posterior horn of medial meniscus  Encounter Diagnosis   Name Primary?    Complex tear of medial meniscus of left knee as current injury, initial encounter Yes          Plan:       He does experience significant relief after aspiration of his knee 3 weeks ago.  Still with some medial joint line pain.  We discussed treatment options for his medial meniscal tear.  This included non operative management with a course of physical therapy and transition to home exercise program with or without a corticosteroid injection.  The risks and benefits of the injection were discussed at length.  We then discussed that operative intervention is also an option.  This would tell arthroscopic partial medial meniscectomy.  Given that he does not have mechanical symptoms and that he had experienced relief with aspiration alone, I recommended we begin with a corticosteroid injection physical therapy.  He is in agreement.  We will administer corticosteroid injection today and he will be referred to physical therapy for demonstration of a home exercise program.  Return to clinic in 3 months for re-evaluation.    All of their questions were answered.  They will call the clinic with any questions or concerns in the interim.    Should the patient's symptoms worsen, persist, or fail to improve they should return for reevaluation and I would be happy to see them back anytime.        Sukumar Agosto M.D.     Please be aware that this note has been generated with the assistance of [facility] voice-to-text.  Please excuse any spelling or grammatical errors.    Thank you for choosing Dr. Sukumar Agosto for your sports medicine care. It is our goal to provide you with exceptional care that will help keep you healthy, active, and get you back in the game.     If you felt that you received exemplary care today, please  consider leaving feedback for Dr. Agosto on [employer] at [email or web address].    Please do not hesitate to reach out to us via email, phone, or [employer] with any questions, concerns, or feedback.

## 2022-07-21 RX ORDER — OMEPRAZOLE 40 MG/1
CAPSULE, DELAYED RELEASE ORAL
Qty: 90 CAPSULE | Refills: 0 | Status: SHIPPED | OUTPATIENT
Start: 2022-07-21 | End: 2022-10-24

## 2022-07-22 NOTE — TELEPHONE ENCOUNTER
No new care gaps identified.  [facility] Embedded Care Gaps. Reference number: [id number]. 7/21/2022   7:12:39 PM CDT

## 2022-07-22 NOTE — TELEPHONE ENCOUNTER
Refill Decision Note   Lopez Hicks  is requesting a refill authorization.  Brief Assessment and Rationale for Refill:  Approve     Medication Therapy Plan:       Medication Reconciliation Completed: No   Comments:     No Care Gaps recommended.     Note composed:9:51 PM 07/21/2022

## 2022-07-26 PROBLEM — R29.898 DECREASED STRENGTH INVOLVING KNEE JOINT: Status: RESOLVED | Noted: 2022-01-10 | Resolved: 2022-07-26

## 2022-07-26 PROBLEM — M25.562 ACUTE PAIN OF LEFT KNEE: Status: ACTIVE | Noted: 2022-07-26

## 2022-07-26 PROBLEM — Z74.09 IMPAIRED FUNCTIONAL MOBILITY AND ACTIVITY TOLERANCE: Status: RESOLVED | Noted: 2022-01-10 | Resolved: 2022-07-26

## 2022-10-03 DIAGNOSIS — E11.42 TYPE 2 DIABETES MELLITUS WITH DIABETIC POLYNEUROPATHY, WITH LONG-TERM CURRENT USE OF INSULIN: ICD-10-CM

## 2022-10-03 DIAGNOSIS — Z79.4 TYPE 2 DIABETES MELLITUS WITH DIABETIC POLYNEUROPATHY, WITH LONG-TERM CURRENT USE OF INSULIN: ICD-10-CM

## 2022-10-03 RX ORDER — INSULIN DEGLUDEC 200 U/ML
INJECTION, SOLUTION SUBCUTANEOUS
Qty: 3 PEN | Refills: 2 | Status: SHIPPED | OUTPATIENT
Start: 2022-10-03 | End: 2022-10-31

## 2022-10-31 DIAGNOSIS — Z79.4 TYPE 2 DIABETES MELLITUS WITH DIABETIC POLYNEUROPATHY, WITH LONG-TERM CURRENT USE OF INSULIN: ICD-10-CM

## 2022-10-31 DIAGNOSIS — E11.42 TYPE 2 DIABETES MELLITUS WITH DIABETIC POLYNEUROPATHY, WITH LONG-TERM CURRENT USE OF INSULIN: ICD-10-CM

## 2022-10-31 RX ORDER — INSULIN DEGLUDEC 200 U/ML
INJECTION, SOLUTION SUBCUTANEOUS
Qty: 3 PEN | Refills: 1 | Status: SHIPPED | OUTPATIENT
Start: 2022-10-31 | End: 2022-11-25

## 2022-11-11 ENCOUNTER — TELEPHONE (OUTPATIENT)
Dept: ENDOCRINOLOGY | Facility: CLINIC | Age: 52
End: 2022-11-11
Payer: COMMERCIAL

## 2022-11-11 NOTE — TELEPHONE ENCOUNTER
----- Message from Josefina Houston sent at 11/11/2022  9:55 AM CST -----  Type: RX Refill Request    Who Called: pt     Have you contacted your pharmacy:    Refill or New Rx:insulin degludec (TRESIBA FLEXTOUCH U-200) 200 unit/mL (3 mL) insulin pen - needs pa    RX Name and Strength:    How is the patient currently taking it? (ex. 1XDay):    Is this a 30 day or 90 day RX:    Preferred Pharmacy with phone number:   [id number] [place] - [address] [address]  [address]  [place] [address]  Phone: [phone] Fax: [phone]        Local or Mail Order:    Ordering Provider:    Would the patient rather a call back or a response via [facility]? call    Best Call Back Number:[phone] (home)       Additional Information:

## 2022-12-21 ENCOUNTER — TELEPHONE (OUTPATIENT)
Dept: INTERNAL MEDICINE | Facility: CLINIC | Age: 52
End: 2022-12-21
Payer: COMMERCIAL

## 2022-12-21 NOTE — TELEPHONE ENCOUNTER
----- Message from Sukumar Mackey DO sent at 12/15/2022  9:04 AM CST -----  Poor control of diabetes, discuss with endocrinology in 3 wks  Normal prostate screen, thyroid/kidney function  Triglycerides are elevated, should drop once diabetes is controlled   Mild anemia which is improving

## 2022-12-27 ENCOUNTER — PATIENT OUTREACH (OUTPATIENT)
Dept: ADMINISTRATIVE | Facility: HOSPITAL | Age: 52
End: 2022-12-27
Payer: COMMERCIAL

## 2022-12-27 ENCOUNTER — PATIENT MESSAGE (OUTPATIENT)
Dept: ADMINISTRATIVE | Facility: HOSPITAL | Age: 52
End: 2022-12-27
Payer: COMMERCIAL

## 2023-01-09 ENCOUNTER — OFFICE VISIT (OUTPATIENT)
Dept: ENDOCRINOLOGY | Facility: CLINIC | Age: 53
End: 2023-01-09
Payer: COMMERCIAL

## 2023-01-09 VITALS
BODY MASS INDEX: 35.91 KG/M2 | WEIGHT: 295 LBS | HEART RATE: 73 BPM | DIASTOLIC BLOOD PRESSURE: 87 MMHG | SYSTOLIC BLOOD PRESSURE: 149 MMHG | TEMPERATURE: 98 F

## 2023-01-09 DIAGNOSIS — R80.9 MICROALBUMINURIA: ICD-10-CM

## 2023-01-09 DIAGNOSIS — E66.01 SEVERE OBESITY (BMI 35.0-39.9) WITH COMORBIDITY: ICD-10-CM

## 2023-01-09 DIAGNOSIS — E11.42 TYPE 2 DIABETES MELLITUS WITH DIABETIC POLYNEUROPATHY, WITH LONG-TERM CURRENT USE OF INSULIN: Primary | ICD-10-CM

## 2023-01-09 DIAGNOSIS — E78.1 HYPERTRIGLYCERIDEMIA: ICD-10-CM

## 2023-01-09 DIAGNOSIS — Z79.4 TYPE 2 DIABETES MELLITUS WITH DIABETIC POLYNEUROPATHY, WITH LONG-TERM CURRENT USE OF INSULIN: Primary | ICD-10-CM

## 2023-01-09 PROCEDURE — 99214 OFFICE O/P EST MOD 30 MIN: CPT | Mod: S$GLB,,, | Performed by: NURSE PRACTITIONER

## 2023-01-09 PROCEDURE — 95251 PR GLUCOSE MONITOR, 72 HOUR, PHYS INTERP: ICD-10-PCS | Mod: S$GLB,,, | Performed by: NURSE PRACTITIONER

## 2023-01-09 PROCEDURE — 99999 PR PBB SHADOW E&M-EST. PATIENT-LVL IV: ICD-10-PCS | Mod: PBBFAC,,, | Performed by: NURSE PRACTITIONER

## 2023-01-09 PROCEDURE — 99999 PR PBB SHADOW E&M-EST. PATIENT-LVL IV: CPT | Mod: PBBFAC,,, | Performed by: NURSE PRACTITIONER

## 2023-01-09 PROCEDURE — 95251 CONT GLUC MNTR ANALYSIS I&R: CPT | Mod: S$GLB,,, | Performed by: NURSE PRACTITIONER

## 2023-01-09 PROCEDURE — 99214 PR OFFICE/OUTPT VISIT, EST, LEVL IV, 30-39 MIN: ICD-10-PCS | Mod: S$GLB,,, | Performed by: NURSE PRACTITIONER

## 2023-01-09 RX ORDER — BLOOD-GLUCOSE SENSOR
EACH MISCELLANEOUS
Qty: 3 EACH | Refills: 12 | Status: SHIPPED | OUTPATIENT
Start: 2023-01-09 | End: 2023-08-03

## 2023-01-09 RX ORDER — INSULIN DEGLUDEC 200 U/ML
INJECTION, SOLUTION SUBCUTANEOUS
Qty: 6 PEN | Refills: 3 | Status: SHIPPED | OUTPATIENT
Start: 2023-01-09 | End: 2023-01-19

## 2023-01-09 RX ORDER — BLOOD-GLUCOSE TRANSMITTER
EACH MISCELLANEOUS
Qty: 1 EACH | Refills: 3 | Status: SHIPPED | OUTPATIENT
Start: 2023-01-09 | End: 2024-04-02

## 2023-01-09 RX ORDER — PEN NEEDLE, DIABETIC 30 GX3/16"
NEEDLE, DISPOSABLE MISCELLANEOUS
Qty: 400 EACH | Refills: 3 | Status: SHIPPED | OUTPATIENT
Start: 2023-01-09

## 2023-01-09 RX ORDER — INSULIN LISPRO 100 [IU]/ML
INJECTION, SOLUTION INTRAVENOUS; SUBCUTANEOUS
Qty: 45 ML | Refills: 2 | Status: SHIPPED | OUTPATIENT
Start: 2023-01-09 | End: 2023-05-19

## 2023-01-09 NOTE — PROGRESS NOTES
CC: This 52 y.o. White male  is here for evaluation of  T2DM along with comorbidities indicated in the Visit Diagnosis section of this encounter.    HPI: Lopez Hicks was diagnosed with T2DM in early 20s, diagnosed upon routine labs.         Prior visit 4/2022  Pt has not been seen for several months. a1c is down from 9.9% in July to now 9.1%.   He did not start pioglitazone, concerned re: s/e of swelling.   He is interested in taking Trulicity again, had stopped it previously d/t nausea.   He lost 2 friends recently from heart attack. Pt states he wants to improve diet and glucose control.   Plan Unable to optimize insulin doses since no glucose data is available.   Continue Tresiba 60 units twice daily and Humalog 40 units before meals.   Change pen needles after each injection.   Improve insulin adherence - listen to your alarms.   Alarm set to restock insulin pen in work bag weekly.   Start Trulicity 0.75 mg weekly.   Patient open to starting pioglitazone 15 mg once daily (ok to break 30 mg tablets in half).   Dexcom sample provided. Prescription sent as well to pharmacy.   Improve diet.   Return to clinic in 3 months with labs prior.     Interval hx  Pt has not been seen for several months. He  had knee injury last year. A1c came down from 9.1% in April at lov to 7.7% in July, up again to 8.6% in Nov.   He did start Trulicity but stopped d/t bloating and nausea. Stopped pioglitazone d/t edema to ankles.   He started Dexcom CGM after lov and enjoys it over Kayleigh CGM.   + 9 lb weight gain since lov. Cites this is the heaviest he's been. Interested in medication to lose weight.     LAST DIABETES EDUCATION:     HOSPITALIZED FOR DIABETES -  No.     DIABETES MEDICATIONS:   Tresiba 60 units bid  Humalog 40 units ac  Pioglitazone 15 mg once daily   Trulicity 0.75 mg weekly   metformin XR 1000 mg bid      Misses medication doses - no pioglitazone or Trulicity    forgets Humalog 2x/week, this is an improvement.       Changes needle every other day.   Injecting to abdomen.   Alarms have helped with insulin adherence.       DM COMPLICATIONS: peripheral neuropathy    SIGNIFICANT DIABETES MED HISTORY:   Trulicity 0.75 mg - nausea and bloating   Ozempic - acid reflux   Pioglitaonze 15 mg - edema to BLE     SELF MONITORING BLOOD GLUCOSE: monitors with Dexcom CGM. See Media for CGM report.   CGM interpretation: overall glucoses are day/night. No hypoglycemia. No significant fluctuation.          HYPOGLYCEMIC EPISODES: rare      CURRENT DIET: drinks unsweet tea and lemonade with AS. No soft drinks. Eats 2-3 meals/day, sometimes skips breakfast.  Snacking more, big portions.     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 Daughter is vegan.       BP (!) 149/87   Pulse 73   Temp 98.4 °F (36.9 °C)   Wt 133.8 kg (295 lb)   BMI 35.91 kg/m²       ROS:   CONSTITUTIONAL: Appetite good, denies fatigue  MS: right knee discomfort.       PHYSICAL EXAM:  GENERAL: Well developed, well nourished. No acute distress.   PSYCH: AAOx3, appropriate mood and affect, conversant, well-groomed. Judgement and insight good.   NEURO: Cranial nerves grossly intact. Speech clear, no tremor.   CHEST: Respirations even and unlabored.   MS: Gait steady. No clubbing.         Hemoglobin A1C   Date Value Ref Range Status   11/14/2022 8.6 (H) 4.0 - 5.6 % Final     Comment:     ADA Screening Guidelines:  5.7-6.4%  Consistent with prediabetes  >or=6.5%  Consistent with diabetes    High levels of fetal hemoglobin interfere with the HbA1C  assay. Heterozygous hemoglobin variants (HbS, HgC, etc)do  not significantly interfere with this assay.   However, presence of multiple variants may affect accuracy.     11/14/2022 8.6 (H) 4.0 - 5.6 % Final     Comment:     ADA Screening Guidelines:  5.7-6.4%  Consistent with  prediabetes  >or=6.5%  Consistent with diabetes    High levels of fetal hemoglobin interfere with the HbA1C  assay. Heterozygous hemoglobin variants (HbS, HgC, etc)do  not significantly interfere with this assay.   However, presence of multiple variants may affect accuracy.     07/06/2022 7.7 (H) 4.0 - 5.6 % Final     Comment:     ADA Screening Guidelines:  5.7-6.4%  Consistent with prediabetes  >or=6.5%  Consistent with diabetes    High levels of fetal hemoglobin interfere with the HbA1C  assay. Heterozygous hemoglobin variants (HbS, HgC, etc)do  not significantly interfere with this assay.   However, presence of multiple variants may affect accuracy.             Chemistry        Component Value Date/Time     11/14/2022 0707    K 3.8 11/14/2022 0707    CL 95 11/14/2022 0707    CO2 32 (H) 11/14/2022 0707    BUN 17 11/14/2022 0707    CREATININE 1.28 11/14/2022 0707     (H) 11/14/2022 0707        Component Value Date/Time    CALCIUM 10.1 11/14/2022 0707    ALKPHOS 58 11/14/2022 0707    AST 28 11/14/2022 0707    ALT 35 11/14/2022 0707    BILITOT 0.3 11/14/2022 0707    ESTGFRAFRICA >60.0 04/08/2022 0723    EGFRNONAA >60.0 04/08/2022 0723         Latest Reference Range & Units 11/14/22 07:07   BUN 2 - 20 mg/dL 17   Creatinine 0.50 - 1.40 mg/dL 1.28   eGFR >60 mL/min/1.73 m^2 >60.0       Lab Results   Component Value Date    LDLCALC 42.6 (L) 11/14/2022        Latest Reference Range & Units 04/08/22 07:23   Cholesterol 120 - 199 mg/dL  120 - 199 mg/dL 143 [1]  143 [2]   HDL 40 - 75 mg/dL  40 - 75 mg/dL 31 (L) [3]  31 (L) [4]   HDL/Cholesterol Ratio 20.0 - 50.0 %  20.0 - 50.0 % 21.7  21.7   LDL Cholesterol External 63.0 - 159.0 mg/dL  63.0 - 159.0 mg/dL Invalid, Trig>400.0 [5]  Invalid, Trig>400.0 [6]   Non-HDL Cholesterol mg/dL 112 [7]  112 [8]   Total Cholesterol/HDL Ratio 2.0 - 5.0   2.0 - 5.0  4.6  4.6   Triglycerides 30 - 150 mg/dL  30 - 150 mg/dL 463 (H) [9]  463 (H) [10]       Lab Results   Component  "Value Date    MICALBCREAT 45.5 (H) 11/14/2022         ASSESSMENT and PLAN:    A1C GOAL: < 7 %     1. Type 2 diabetes mellitus with diabetic polyneuropathy, with long-term current use of insulin  Change pen needle before each injection.     Start Mounjaro 2.5 mg once weekly. Savings card provided.   Start Jardiance 10 mg once daily in AM for both glucose control and kidney protection.   Increase Tresiba to 70 units twice daily.   Increase Humalog to 46 units before each meal.   Continue monitoring glucoses with Dexcom CGM.   Return to clinic in in 3 months with labs prior. A1c today.     insulin degludec (TRESIBA FLEXTOUCH U-200) 200 unit/mL (3 mL) insulin pen    insulin lispro (HUMALOG KWIKPEN INSULIN) 100 unit/mL pen    pen needle, diabetic (BD TENA 2ND GEN PEN NEEDLE) 32 gauge x 5/32" Ndle    Hemoglobin A1C         2. Hypertriglyceridemia  Improved  Improve glycemic control.         3. Severe obesity (BMI 35.0-39.9) with comorbidity  Increases insulin resistance.   Start Mounjaro. Hopefully he can tolerate this. Has tried and failed Trulicity and Ozempic.       4. Microalbuminuria  Start Jardiance.         Orders Placed This Encounter   Procedures    Hemoglobin A1C     Standing Status:   Standing     Number of Occurrences:   2     Standing Expiration Date:   3/9/2024        Follow up in about 3 months (around 4/9/2023).         "

## 2023-01-09 NOTE — PATIENT INSTRUCTIONS
Change pen needle before each injection.     Start Mounjaro 2.5 mg once weekly. Savings card provided.   Start Jardiance 10 mg once daily in AM for both glucose control and kidney protection.   Increase Tresiba to 70 units twice daily.   Increase Humalog to 46 units before each meal.   Continue monitoring glucoses with Dexcom CGM.   Return to clinic in in 3 months with labs prior. A1c today.

## 2023-01-10 ENCOUNTER — PATIENT MESSAGE (OUTPATIENT)
Dept: ENDOCRINOLOGY | Facility: CLINIC | Age: 53
End: 2023-01-10
Payer: COMMERCIAL

## 2023-01-10 DIAGNOSIS — Z79.4 TYPE 2 DIABETES MELLITUS WITH DIABETIC POLYNEUROPATHY, WITH LONG-TERM CURRENT USE OF INSULIN: Primary | ICD-10-CM

## 2023-01-10 DIAGNOSIS — E11.42 TYPE 2 DIABETES MELLITUS WITH DIABETIC POLYNEUROPATHY, WITH LONG-TERM CURRENT USE OF INSULIN: Primary | ICD-10-CM

## 2023-01-11 RX ORDER — TIRZEPATIDE 2.5 MG/.5ML
2.5 INJECTION, SOLUTION SUBCUTANEOUS
Qty: 4 PEN | Refills: 3 | Status: SHIPPED | OUTPATIENT
Start: 2023-01-11 | End: 2023-01-31

## 2023-01-18 ENCOUNTER — TELEPHONE (OUTPATIENT)
Dept: ENDOCRINOLOGY | Facility: CLINIC | Age: 53
End: 2023-01-18
Payer: COMMERCIAL

## 2023-01-18 ENCOUNTER — PATIENT MESSAGE (OUTPATIENT)
Dept: ENDOCRINOLOGY | Facility: CLINIC | Age: 53
End: 2023-01-18
Payer: COMMERCIAL

## 2023-01-18 ENCOUNTER — PATIENT MESSAGE (OUTPATIENT)
Dept: ADMINISTRATIVE | Facility: HOSPITAL | Age: 53
End: 2023-01-18
Payer: COMMERCIAL

## 2023-01-18 DIAGNOSIS — E11.42 TYPE 2 DIABETES MELLITUS WITH DIABETIC POLYNEUROPATHY, WITH LONG-TERM CURRENT USE OF INSULIN: ICD-10-CM

## 2023-01-18 DIAGNOSIS — Z79.4 TYPE 2 DIABETES MELLITUS WITH DIABETIC POLYNEUROPATHY, WITH LONG-TERM CURRENT USE OF INSULIN: ICD-10-CM

## 2023-01-18 NOTE — TELEPHONE ENCOUNTER
----- Message from Belinda Sierra sent at 1/18/2023  9:16 AM CST -----  Regarding: Medication  Refill  Request              Reply in [facility]:  NO      Please refill the medication listed below. Please call the patient : [phone] ([phone]        Medication:  insulin degludec (TRESIBA FLEXTOUCH U-200) 200 unit/mL (3 mL) insulin pen                        Sig: Inject 70 units twice daily      Preferred Pharmacy:    [id number] - [place] [address] [address]   Phone: [phone]  Fax:  [phone]

## 2023-01-19 ENCOUNTER — PATIENT MESSAGE (OUTPATIENT)
Dept: ENDOCRINOLOGY | Facility: CLINIC | Age: 53
End: 2023-01-19
Payer: COMMERCIAL

## 2023-01-19 ENCOUNTER — PATIENT MESSAGE (OUTPATIENT)
Dept: ADMINISTRATIVE | Facility: HOSPITAL | Age: 53
End: 2023-01-19
Payer: COMMERCIAL

## 2023-01-19 RX ORDER — INSULIN DEGLUDEC 200 U/ML
INJECTION, SOLUTION SUBCUTANEOUS
Qty: 21 PEN | Refills: 1 | Status: SHIPPED | OUTPATIENT
Start: 2023-01-19 | End: 2023-05-19

## 2023-01-19 RX ORDER — HYDRALAZINE HYDROCHLORIDE 25 MG/1
25 TABLET, FILM COATED ORAL 3 TIMES DAILY
Qty: 90 TABLET | Refills: 11 | Status: SHIPPED | OUTPATIENT
Start: 2023-01-19 | End: 2024-03-22

## 2023-01-31 ENCOUNTER — PATIENT MESSAGE (OUTPATIENT)
Dept: ENDOCRINOLOGY | Facility: CLINIC | Age: 53
End: 2023-01-31
Payer: COMMERCIAL

## 2023-01-31 RX ORDER — TIRZEPATIDE 5 MG/.5ML
5 INJECTION, SOLUTION SUBCUTANEOUS
Qty: 4 PEN | Refills: 3 | Status: SHIPPED | OUTPATIENT
Start: 2023-01-31 | End: 2023-02-01

## 2023-02-01 ENCOUNTER — TELEPHONE (OUTPATIENT)
Dept: ENDOCRINOLOGY | Facility: CLINIC | Age: 53
End: 2023-02-01
Payer: COMMERCIAL

## 2023-02-01 ENCOUNTER — PATIENT MESSAGE (OUTPATIENT)
Dept: ENDOCRINOLOGY | Facility: CLINIC | Age: 53
End: 2023-02-01
Payer: COMMERCIAL

## 2023-02-01 LAB
LEFT EYE DM RETINOPATHY: POSITIVE
RIGHT EYE DM RETINOPATHY: POSITIVE

## 2023-02-01 RX ORDER — TIRZEPATIDE 5 MG/.5ML
5 INJECTION, SOLUTION SUBCUTANEOUS
Qty: 4 PEN | Refills: 3 | Status: SHIPPED | OUTPATIENT
Start: 2023-02-01 | End: 2023-02-01

## 2023-02-01 RX ORDER — TIRZEPATIDE 5 MG/.5ML
5 INJECTION, SOLUTION SUBCUTANEOUS
Qty: 4 PEN | Refills: 3 | Status: SHIPPED | OUTPATIENT
Start: 2023-02-01 | End: 2023-05-12

## 2023-02-01 NOTE — TELEPHONE ENCOUNTER
----- Message from Mandy Hallman MA sent at 1/31/2023  4:01 PM CST -----  Regarding: FW: Pharmacy Call    ----- Message -----  From: Fatimah Hoang  Sent: 1/31/2023   3:59 PM CST  To: [facility] Staff  Subject: Pharmacy Call                                    .Type:  Pharmacy Calling to Clarify an RX    Name of Caller: Mini     [facility]     Prescription Name: tirzepatide (MOUNJARO) 5 mg/0.5 mL PnIj    What do they need to clarify? Diagnosis code         Best Call Back Number:  [phone]

## 2023-02-02 ENCOUNTER — OFFICE VISIT (OUTPATIENT)
Dept: SPORTS MEDICINE | Facility: CLINIC | Age: 53
End: 2023-02-02
Payer: COMMERCIAL

## 2023-02-02 VITALS
BODY MASS INDEX: 35.92 KG/M2 | SYSTOLIC BLOOD PRESSURE: 166 MMHG | HEART RATE: 79 BPM | WEIGHT: 295 LBS | HEIGHT: 76 IN | DIASTOLIC BLOOD PRESSURE: 86 MMHG

## 2023-02-02 DIAGNOSIS — M25.461 EFFUSION OF RIGHT KNEE JOINT: Primary | ICD-10-CM

## 2023-02-02 PROCEDURE — 99214 OFFICE O/P EST MOD 30 MIN: CPT | Mod: S$GLB,,, | Performed by: STUDENT IN AN ORGANIZED HEALTH CARE EDUCATION/TRAINING PROGRAM

## 2023-02-02 PROCEDURE — 99999 PR PBB SHADOW E&M-EST. PATIENT-LVL V: CPT | Mod: PBBFAC,,, | Performed by: STUDENT IN AN ORGANIZED HEALTH CARE EDUCATION/TRAINING PROGRAM

## 2023-02-02 PROCEDURE — 99214 PR OFFICE/OUTPT VISIT, EST, LEVL IV, 30-39 MIN: ICD-10-PCS | Mod: S$GLB,,, | Performed by: STUDENT IN AN ORGANIZED HEALTH CARE EDUCATION/TRAINING PROGRAM

## 2023-02-02 PROCEDURE — 99999 PR PBB SHADOW E&M-EST. PATIENT-LVL V: ICD-10-PCS | Mod: PBBFAC,,, | Performed by: STUDENT IN AN ORGANIZED HEALTH CARE EDUCATION/TRAINING PROGRAM

## 2023-02-02 NOTE — PROGRESS NOTES
Subjective:       Patient ID: Lopez Hicks is a 47 y.o. male.    Chief Complaint: Annual Exam    HPI Data obtained from [employer] and [employer] Quality (AHRQ):    GRADE A -The [employer]PSTF recommends the service. There is high certainty that the net benefit is substantial. Offer or provide this service.    GRADE B - The [employer]PSTF recommends the service. There is high certainty that the net benefit is moderate or there is moderate certainty that the net benefit is moderate to substantial. Offer or provide this service.    View All      13 - Recommended (A, B)    Grade Title Risk Info. Details   Low  HIV: Screening - Adolescents and Adults     Normal  High Blood Pressure: Screening and Home Monitoring -- Adults     Low  Syphilis: Screening --Asymptomatic, nonpregnant adults and adolescents who are at increased risk for syphilis infection     Denies  Alcohol Misuse: Screening and Behavioral Counseling Interventions in Primary Care -- Adults     Denies  Depression: Screening -- General adult population, including pregnant and postpartum women     Uncontrolled  Diabetes Mellitus (Type 2) andAbnormal Blood Glucose: Screening -- Adults aged 40 to 70 years who are overweight or obese     Non compliant  Healthful Diet and Physical Activity for CVD Disease Prevention: Counseling -- Adults with CVD Risk Factors     Low  Hepatitis B: Screening -- Nonpregnant Adolescents and Adults At High Risk     Not candidate  Hepatitis C Virus Infection: Screening--Adults at High Risk and Adults born between 1945 and 1965     Low  Latent Tuberculosis Infection: Screening -- Asymptomatic adults at increased risk for infection     Stage 2  Obesity: Screening for and Management of-- All Adults       Low  Sexually Transmitted Infections: Behavioral Counseling -- Sexually Active Adolescents and Adults     Current compliant  Statin Use for the Primary Prevention of CVD: Preventive Medicine -- Adults age 40 to 75 years with no history of  CVD, 1 or more CVD risk factors, and a calculated 10-year CVD event risk of 10% or greater.         The patient presents for medical management of his chronic medical problems including diabetes mellitus, hypertension, BEVERLY, hypercholesterolemia and his other medical problems. He is not compliant with his diet. He is active. He sometimes forgets bydureon but is compliant with his medical problems.     Review of Systems   Constitutional: Positive for activity change and fatigue. Negative for appetite change and fever.   HENT: Negative.    Eyes: Positive for visual disturbance.   Respiratory: Negative.  Negative for shortness of breath.    Cardiovascular: Negative.  Negative for chest pain.   Gastrointestinal: Positive for diarrhea.   Endocrine: Positive for polydipsia. Negative for cold intolerance and heat intolerance.   Genitourinary: Negative.  Negative for difficulty urinating.        Erectile issues    Musculoskeletal: Negative.    Skin: Negative.    Allergic/Immunologic: Negative.    Neurological: Positive for weakness. Negative for numbness.   Hematological: Negative.    Psychiatric/Behavioral: Negative.  Negative for sleep disturbance.        Sleeps better since on CPAP    All other systems reviewed and are negative.      Objective:      Vitals:    01/25/18 0836   BP: 120/80   Pulse: 93     Physical Exam   Constitutional: He is oriented to person, place, and time. He appears well-developed and well-nourished. He is cooperative. No distress.   HENT:   Head: Normocephalic and atraumatic.   Right Ear: Hearing, tympanic membrane, external ear and ear canal normal.   Left Ear: Hearing, external ear and ear canal normal.   Nose: Nose normal.   Mouth/Throat: Oropharynx is clear and moist.   Eyes: Conjunctivae are normal. Pupils are equal, round, and reactive to light.   Neck: Normal range of motion. Neck supple. No thyromegaly present.   Cardiovascular: Normal rate, regular rhythm, normal heart sounds and intact  distal pulses.    Pulmonary/Chest: Effort normal and breath sounds normal. No respiratory distress.   Musculoskeletal: Normal range of motion. He exhibits no edema or tenderness.   Lymphadenopathy:     He has no cervical adenopathy.   Neurological: He is alert and oriented to person, place, and time. He has normal strength. Coordination and gait normal.   Skin: Skin is warm, dry and intact. No cyanosis. Nails show no clubbing.   Psychiatric: He has a normal mood and affect. His speech is normal and behavior is normal. Judgment and thought content normal. Cognition and memory are normal.   Vitals reviewed.      Assessment:       1. Type 2 diabetes mellitus without complication, with long-term current use of insulin    2. Pure hypercholesterolemia    3. Encounter for preventive health examination    4. Erectile dysfunction, unspecified erectile dysfunction type    5. Preventive measure    6. Muscle weakness        Plan:       Type 2 diabetes mellitus without complication, with long-term current use of insulin  -     Ambulatory referral to Ophthalmology  -     CBC auto differential; Future; Expected date: 01/25/2018  -     Comprehensive metabolic panel; Future; Expected date: 01/25/2018  -     Hemoglobin A1c; Future; Expected date: 01/25/2018  -     Lipid panel; Future; Expected date: 01/25/2018  -     Microalbumin/creatinine urine ratio; Future; Expected date: 01/25/2018  -     TSH; Future; Expected date: 01/25/2018  -     insulin lispro (HUMALOG KWIKPEN) 100 unit/mL InPn pen; Inject 50 Units into the skin daily with dinner or evening meal.  Dispense: 90 mL; Refill: 2    Pure hypercholesterolemia    Encounter for preventive health examination    Erectile dysfunction, unspecified erectile dysfunction type  -     sildenafil (VIAGRA) 100 MG tablet; Take 1 tablet (100 mg total) by mouth daily as needed for Erectile Dysfunction.  Dispense: 18 tablet; Refill: 3  -     Testosterone; Future; Expected date:  01/25/2018    Preventive measure  -     (In Office Administered) Tdap Vaccine    Muscle weakness  -     CK; Future; Expected date: 01/25/2018    Other orders  -     Influenza - Quadrivalent      Follow-up in about 3 months (around 4/25/2018).        Vacurect    No

## 2023-02-02 NOTE — PROGRESS NOTES
Subjective:          Chief Complaint: Lopez Hicks is a 52 y.o. male who had concerns including Pain of the Right Knee.    HPI 02/02/2023:  Lopez Hicks is a 52 y.o. male presents today for evaluation of his right knee.  He was seen prior for his contralateral left knee read effusion meniscus tear.  He had excellent resolution of the symptoms after aspiration and some rehabilitation.  The left knee is asymptomatic.  About 1 year ago began having some pain in the right knee and this is gradually worsened.  It has now gotten to the point where he has frequent buckling as well as catching of the knee.  He is also noticed swelling persist throughout the day.  This makes it difficult for him to perform basic activities of daily living.      HPI 7/18/2022:  Lopez Hicks is a 52 y.o. male returns to clinic today for follow-up for his left knee.  He was last seen about 3 weeks for physical appointment MRI was obtained, see my detailed interpretation.  At the time knee was also aspirated.  This provided great relief his symptoms.  He now has near full motion of the knee.  Denies any mechanical symptoms still has medial joint line pain.  Denies any instability to the knee.  He is here today to discuss results and treatment options.    HPI 6/28/2022  Lopez Hicks is a 52 y.o. male presents for evaluation of left knee pain.  He was last seen by me about 4 and half months ago for a partial PCL injury, grade 2.  He had near 100% resolution of his pain and instability after course of physical therapy and transition to a home exercise program.  He did great with this.  Two days ago he had a twisting/turning injury to his left knee.  Denies hearing or feeling a pop.  The knee became swollen fairly quickly.  He now alternate between intense spurs of pain where he cannot bear weight verses mild aches.  He has noted steady crease range of motion and fullness sensation in his knee but denies any  mechanical symptoms such as catching or locking.  Denies any instability.  The pain is located along the medial joint line and does not radiate.  Denies any numbness or paresthesias to the left lower extremity.    Pain  Associated symptoms include joint swelling. Pertinent negatives include no myalgias or neck pain.   Follow-up  Associated symptoms include joint swelling. Pertinent negatives include no myalgias or neck pain.   Past Medical History:   Diagnosis Date    Diabetes mellitus, type 2     GERD (gastroesophageal reflux disease)     Hyperlipidemia     Hyperthyroidism     Mild nonproliferative diabetic retinopathy 2015       Current Outpatient Medications on File Prior to Visit   Medication Sig Dispense Refill    amLODIPine (NORVASC) 10 MG tablet TAKE 1 TABLET BY MOUTH ONCE DAILY 90 tablet 3    aspirin (ECOTRIN) 81 MG EC tablet Take 81 mg by mouth once daily.      atenoloL (TENORMIN) 100 MG tablet TAKE 1 TABLET BY MOUTH ONCE DAILY 90 tablet 3    blood sugar diagnostic Strp Check blood glucose 4 times a day - before meals and at bedtime. 400 strip 3    blood-glucose sensor (DEXCOM G6 SENSOR) Stephanie Change sensor every 10 days 3 each 12    blood-glucose transmitter (DEXCOM G6 TRANSMITTER) Stephanie Change every 3 months 1 each 3    buPROPion (WELLBUTRIN SR) 200 MG SR12 TAKE ONE TABLET BY MOUTH 2 TIMES A  tablet 3    clindamycin (CLEOCIN) 300 MG capsule TAKE ONE CAPSULE BY MOUTH EVERY EIGHT HOURS 30 capsule 0    diabetic supplies, [employer]. Kit Please change sensor every 14 days. FreeStyle Kayleigh Sensor 30-day supply (2 sensors/month) 2 kit 11    empagliflozin (JARDIANCE) 10 mg tablet Take 1 tablet (10 mg total) by mouth once daily. 30 tablet 3    fenofibrate (TRICOR) 145 MG tablet TAKE 1 TABLET BY MOUTH ONCE DAILY 90 tablet 3    hydrALAZINE (APRESOLINE) 25 MG tablet Take 1 tablet (25 mg total) by mouth 3 (three) times daily. 90 tablet 11    hydroCHLOROthiazide (HYDRODIURIL) 25 MG tablet TAKE 1 TABLET BY MOUTH  "ONCE DAILY 90 tablet 1    insulin degludec (TRESIBA FLEXTOUCH U-200) 200 unit/mL (3 mL) insulin pen Inject 70 units twice daily 21 pen 1    insulin lispro (HUMALOG KWIKPEN INSULIN) 100 unit/mL pen INJECT 46 UNITS SUBCUTANEOUSLY BEFORE MEALS. 45 mL 2    losartan (COZAAR) 100 MG tablet Take 1 tablet (100 mg total) by mouth once daily. 90 tablet 3    metFORMIN (GLUCOPHAGE-XR) 500 MG ER 24hr tablet TAKE TWO TABLETS BY MOUTH TWICE DAILY WITH MEALS 360 tablet 2    multivitamin (THERAGRAN) per tablet Take 1 tablet by mouth once daily.      omeprazole (PRILOSEC) 40 MG capsule TAKE 1 CAPSULE BY MOUTH ONCE DAILY 90 capsule 3    ONETOUCH DELICA LANCETS 33 gauge Misc       pen needle, diabetic (BD TENA 2ND GEN PEN NEEDLE) 32 gauge x 5/32" Ndle USE ONE NEEDLE FOR INJECTION FOUR TIMES DAILY 400 each 3    pravastatin (PRAVACHOL) 40 MG tablet TAKE 1 TABLET BY MOUTH ONCE DAILY 90 tablet 3    sildenafil (VIAGRA) 100 MG tablet       tirzepatide (MOUNJARO) 5 mg/0.5 mL PnIj Inject 5 mg into the skin every 7 days. 4 pen 3    vitamin D (VITAMIN D3) 1000 units Tab Take 1,000 Units by mouth once daily.       No current facility-administered medications on file prior to visit.       Past Surgical History:   Procedure Laterality Date    CHOLECYSTECTOMY      LUNG LOBECTOMY Right     after severe pneumonia    NASAL/SINUS ENDOSCOPY      ROTATOR CUFF REPAIR Bilateral     Dr. JERRY Cardona, and Dr. Jung.       Family History   Problem Relation Age of Onset    Hypertension Mother     Diabetes Father     Hyperlipidemia Father     Hypertension Father     Heart disease Father     Prostate cancer Father 60        radioactive seed    Diabetes Paternal Grandmother        Social History     Socioeconomic History    Marital status:     Number of children: 3   Occupational History    Occupation:    Tobacco Use    Smoking status: Never    Smokeless tobacco: Never   Substance and Sexual Activity    Alcohol use: Yes     Alcohol/week: 0.0 standard " drinks     Comment: beer once every 3 months    Drug use: No    Sexual activity: Yes     Partners: Female   Social History Narrative     and lives with wife and twin daughters.  Works at [employer].  Enjoys hunting and fishing.  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        Review of Systems   Constitutional: Negative.   HENT: Negative.     Eyes: Negative.    Cardiovascular: Negative.    Respiratory: Negative.     Endocrine: Negative.    Hematologic/Lymphatic: Negative.    Skin: Negative.    Musculoskeletal:  Positive for joint pain and joint swelling. Negative for arthritis, back pain, falls, muscle cramps, muscle weakness, myalgias, neck pain and stiffness.   Neurological: Negative.    Psychiatric/Behavioral: Negative.     Allergic/Immunologic: Negative.                  Objective:        General: Lopez is well-developed, well-nourished, appears stated age, in no acute distress, alert and oriented to time, place and person.     General    Nursing note and vitals reviewed.  Constitutional: He is oriented to person, place, and time. He appears well-developed and well-nourished. No distress.   HENT:   Head: Normocephalic and atraumatic.   Nose: Nose normal.   Eyes: EOM are normal.   Cardiovascular:  Normal rate and intact distal pulses.            Pulmonary/Chest: Effort normal. No respiratory distress.   Neurological: He is alert and oriented to person, place, and time.   Psychiatric: He has a normal mood and affect. His behavior is normal. Judgment and thought content normal.     General Musculoskeletal Exam   Gait: abnormal and antalgic       Right Knee Exam     Inspection   Erythema: absent  Scars: absent  Swelling: present  Effusion: present  Deformity: absent  Bruising: absent    Tenderness   The patient is tender to palpation of the patella and lateral joint line.    Crepitus   The patient has crepitus of the patella.    Range of Motion   Extension:  -5   Flexion:  130     Tests    Meniscus   Mary:  Medial - positive (For pain) Lateral - positive (for pain)  Ligament Examination   Lachman: normal (-1 to 2mm)   PCL-Posterior Drawer: normal (0 to 2mm)     MCL - Valgus: normal (0 to 2mm)  LCL - Varus: normal  Patella   Passive Patellar Tilt: neutral  Patellar Tracking: normal  Patellar Grind: positive    Other   Sensation: normal    Left Knee Exam     Inspection   Erythema: absent  Scars: absent  Swelling: absent  Effusion: absent  Deformity: absent  Bruising: absent    Tenderness   The patient is experiencing no tenderness (Posterior joint line).     Range of Motion   Extension:  -5   Flexion:  140     Tests   Meniscus   Mary:  Medial - negative (Pain only) Lateral - negative  Stability   Lachman: normal (-1 to 2mm)   PCL-Posterior Drawer: normal (0 to 2mm)  MCL - Valgus: normal (0 to 2mm)  LCL - Varus: normal (0 to 2mm)  Posterolateral Corner: stable  Patella   Passive Patellar Tilt: neutral  Patellar Tracking: normal    Other   Sensation: normal    Muscle Strength   Right Lower Extremity   Quadriceps:  5/5   Hamstrin/5   Left Lower Extremity   Quadriceps:  5/5   Hamstrin/5     Vascular Exam     Right Pulses  Dorsalis Pedis:      2+  Posterior Tibial:      2+        Left Pulses  Dorsalis Pedis:      2+  Posterior Tibial:      2+      Imaging:  X-rays of the left knee from 2021 personally reviewed by me 2022.  These include weight-bearing AP, lateral, and Merchant views.  Joint spaces are well preserved in all 3 compartments.    MRI of the left knee from 2021 personally reviewed by me 2022.  There is a high-grade to tearing of the distal aspect of the posterior cruciate ligament.  There is a joint effusion.  The ACL and collateral ligaments are intact.  Mediolateral meniscus are intact.  There is grade 2-3 changes in the cartilage of the medial compartment and patellofemoral compartment, lateral compartment is unremarkable.    X-rays of bilateral knees  from 06/28/2022 personally reviewed by me on that day.  Weight-bearing AP, PA flexion, lateral, and Merchant views.  There is an effusion on the left knee.  There are moderate arthritic changes in all 3 compartments on the right with osteophyte formation.  On the left knee, the joint spaces are well preserved with no osteophyte formation.    MRI left knee from 07/18/2022 personally reviewed by me on that day.  There was a complex tear involving the posterior horn of the medial meniscus with a vertical and horizontal.  There is no extension into the posterior or anterior root.  The lateral meniscus is intact including at the posterior and anterior roots.  The ACL is intact.  There was laxity in the PCL consistent his previous injury.  Collateral ligaments are intact.  Overall the cartilage was preserved in all 3 compartments.        Assessment:     Lopez Hicks is a 52 y.o. male with right knee pain and effusion concerning for cartilage damage versus meniscal etiology   Encounter Diagnosis   Name Primary?    Effusion of right knee joint Yes          Plan:       Given the effusion and exam findings we will obtain an MRI to evaluate the integrity of the meniscus.  Return to clinic after recess the findings and treatment options.      All of their questions were answered.  They will call the clinic with any questions or concerns in the interim.    Should the patient's symptoms worsen, persist, or fail to improve they should return for reevaluation and I would be happy to see them back anytime.        Sukumar Agosto M.D.    Please be aware that this note has been generated with the assistance of [facility] voice-to-text.  Please excuse any spelling or grammatical errors.    Thank you for choosing Dr. Sukumar Agosto for your sports medicine care. It is our goal to provide you with exceptional care that will help keep you healthy, active, and get you back in the game.     If you felt that you received exemplary care  today, please consider leaving feedback for Dr. Agosto on [employer] at [email or web address].    Please do not hesitate to reach out to us via email, phone, or [employer] with any questions, concerns, or feedback.

## 2023-02-14 ENCOUNTER — OFFICE VISIT (OUTPATIENT)
Dept: SPORTS MEDICINE | Facility: CLINIC | Age: 53
End: 2023-02-14
Payer: COMMERCIAL

## 2023-02-14 VITALS — WEIGHT: 286.5 LBS | BODY MASS INDEX: 34.89 KG/M2 | HEIGHT: 76 IN

## 2023-02-14 DIAGNOSIS — M17.11 OSTEOARTHRITIS OF RIGHT PATELLOFEMORAL JOINT: Primary | ICD-10-CM

## 2023-02-14 PROCEDURE — 99214 OFFICE O/P EST MOD 30 MIN: CPT | Mod: 25,S$GLB,, | Performed by: STUDENT IN AN ORGANIZED HEALTH CARE EDUCATION/TRAINING PROGRAM

## 2023-02-14 PROCEDURE — 97110 THERAPEUTIC EXERCISES: CPT | Mod: S$GLB,,, | Performed by: STUDENT IN AN ORGANIZED HEALTH CARE EDUCATION/TRAINING PROGRAM

## 2023-02-14 PROCEDURE — 99999 PR PBB SHADOW E&M-EST. PATIENT-LVL IV: CPT | Mod: PBBFAC,,, | Performed by: STUDENT IN AN ORGANIZED HEALTH CARE EDUCATION/TRAINING PROGRAM

## 2023-02-14 PROCEDURE — 99214 PR OFFICE/OUTPT VISIT, EST, LEVL IV, 30-39 MIN: ICD-10-PCS | Mod: 25,S$GLB,, | Performed by: STUDENT IN AN ORGANIZED HEALTH CARE EDUCATION/TRAINING PROGRAM

## 2023-02-14 PROCEDURE — 99999 PR PBB SHADOW E&M-EST. PATIENT-LVL IV: ICD-10-PCS | Mod: PBBFAC,,, | Performed by: STUDENT IN AN ORGANIZED HEALTH CARE EDUCATION/TRAINING PROGRAM

## 2023-02-14 PROCEDURE — 97110 PR THERAPEUTIC EXERCISES: ICD-10-PCS | Mod: S$GLB,,, | Performed by: STUDENT IN AN ORGANIZED HEALTH CARE EDUCATION/TRAINING PROGRAM

## 2023-02-14 NOTE — PROGRESS NOTES
Subjective:          Chief Complaint: Lopez Hicks is a 52 y.o. male who had concerns including Follow-up of the Right Knee.    HPI 02/14/2023:  Lopez Hicks is a 52 y.o. male returns today for follow-up for his right knee.  He would an MRI since last visit, see my detailed interpretation below.  Overall symptoms are essentially unchanged.  He continues to have pain mainly peripatellar.  He is here to discuss the MRI results and potential treatment options.    HPI 02/02/2023:  Lopez Hicks is a 52 y.o. male presents today for evaluation of his right knee.  He was seen prior for his contralateral left knee read effusion meniscus tear.  He had excellent resolution of the symptoms after aspiration and some rehabilitation.  The left knee is asymptomatic.  About 1 year ago began having some pain in the right knee and this is gradually worsened.  It has now gotten to the point where he has frequent buckling as well as catching of the knee.  He is also noticed swelling persist throughout the day.  This makes it difficult for him to perform basic activities of daily living.      HPI 7/18/2022:  Lopez Hicks is a 52 y.o. male returns to clinic today for follow-up for his left knee.  He was last seen about 3 weeks for physical appointment MRI was obtained, see my detailed interpretation.  At the time knee was also aspirated.  This provided great relief his symptoms.  He now has near full motion of the knee.  Denies any mechanical symptoms still has medial joint line pain.  Denies any instability to the knee.  He is here today to discuss results and treatment options.    HPI 6/28/2022  Lopez Hicks is a 52 y.o. male presents for evaluation of left knee pain.  He was last seen by me about 4 and half months ago for a partial PCL injury, grade 2.  He had near 100% resolution of his pain and instability after course of physical therapy and transition to a home exercise program.  He did great  with this.  Two days ago he had a twisting/turning injury to his left knee.  Denies hearing or feeling a pop.  The knee became swollen fairly quickly.  He now alternate between intense spurs of pain where he cannot bear weight verses mild aches.  He has noted steady crease range of motion and fullness sensation in his knee but denies any mechanical symptoms such as catching or locking.  Denies any instability.  The pain is located along the medial joint line and does not radiate.  Denies any numbness or paresthesias to the left lower extremity.    Pain  Associated symptoms include joint swelling. Pertinent negatives include no myalgias or neck pain.   Follow-up  Associated symptoms include joint swelling. Pertinent negatives include no myalgias or neck pain.   Past Medical History:   Diagnosis Date    Diabetes mellitus, type 2     GERD (gastroesophageal reflux disease)     Hyperlipidemia     Hyperthyroidism     Mild nonproliferative diabetic retinopathy 2015       Current Outpatient Medications on File Prior to Visit   Medication Sig Dispense Refill    amLODIPine (NORVASC) 10 MG tablet TAKE 1 TABLET BY MOUTH ONCE DAILY 90 tablet 3    aspirin (ECOTRIN) 81 MG EC tablet Take 81 mg by mouth once daily.      atenoloL (TENORMIN) 100 MG tablet TAKE 1 TABLET BY MOUTH ONCE DAILY 90 tablet 3    blood sugar diagnostic Strp Check blood glucose 4 times a day - before meals and at bedtime. 400 strip 3    blood-glucose sensor (DEXCOM G6 SENSOR) Stephanie Change sensor every 10 days 3 each 12    blood-glucose transmitter (DEXCOM G6 TRANSMITTER) Stephanie Change every 3 months 1 each 3    buPROPion (WELLBUTRIN SR) 200 MG SR12 TAKE ONE TABLET BY MOUTH 2 TIMES A  tablet 3    clindamycin (CLEOCIN) 300 MG capsule TAKE ONE CAPSULE BY MOUTH EVERY EIGHT HOURS 30 capsule 0    diabetic supplies, miscellan. Kit Please change sensor every 14 days. FreeStyle Kayleigh Sensor 30-day supply (2 sensors/month) 2 kit 11    empagliflozin (JARDIANCE) 10 mg  "tablet Take 1 tablet (10 mg total) by mouth once daily. 30 tablet 3    fenofibrate (TRICOR) 145 MG tablet TAKE 1 TABLET BY MOUTH ONCE DAILY 90 tablet 3    hydrALAZINE (APRESOLINE) 25 MG tablet Take 1 tablet (25 mg total) by mouth 3 (three) times daily. 90 tablet 11    hydroCHLOROthiazide (HYDRODIURIL) 25 MG tablet TAKE 1 TABLET BY MOUTH ONCE DAILY 90 tablet 1    insulin degludec (TRESIBA FLEXTOUCH U-200) 200 unit/mL (3 mL) insulin pen Inject 70 units twice daily 21 pen 1    insulin lispro (HUMALOG KWIKPEN INSULIN) 100 unit/mL pen INJECT 46 UNITS SUBCUTANEOUSLY BEFORE MEALS. 45 mL 2    losartan (COZAAR) 100 MG tablet Take 1 tablet (100 mg total) by mouth once daily. 90 tablet 3    metFORMIN (GLUCOPHAGE-XR) 500 MG ER 24hr tablet TAKE TWO TABLETS BY MOUTH TWICE DAILY WITH MEALS 360 tablet 2    multivitamin (THERAGRAN) per tablet Take 1 tablet by mouth once daily.      omeprazole (PRILOSEC) 40 MG capsule TAKE 1 CAPSULE BY MOUTH ONCE DAILY 90 capsule 3    ONETOUCH DELICA LANCETS 33 gauge Misc       pen needle, diabetic (BD TENA 2ND GEN PEN NEEDLE) 32 gauge x 5/32" Ndle USE ONE NEEDLE FOR INJECTION FOUR TIMES DAILY 400 each 3    pravastatin (PRAVACHOL) 40 MG tablet TAKE 1 TABLET BY MOUTH ONCE DAILY 90 tablet 3    sildenafil (VIAGRA) 100 MG tablet       tirzepatide (MOUNJARO) 5 mg/0.5 mL PnIj Inject 5 mg into the skin every 7 days. 4 pen 3    vitamin D (VITAMIN D3) 1000 units Tab Take 1,000 Units by mouth once daily.       No current facility-administered medications on file prior to visit.       Past Surgical History:   Procedure Laterality Date    CHOLECYSTECTOMY      LUNG LOBECTOMY Right     after severe pneumonia    NASAL/SINUS ENDOSCOPY      ROTATOR CUFF REPAIR Bilateral     Dr. JERRY Cardona, and Dr. Jung.       Family History   Problem Relation Age of Onset    Hypertension Mother     Diabetes Father     Hyperlipidemia Father     Hypertension Father     Heart disease Father     Prostate cancer Father 60        " radioactive seed    Diabetes Paternal Grandmother        Social History     Socioeconomic History    Marital status:     Number of children: 3   Occupational History    Occupation:    Tobacco Use    Smoking status: Never    Smokeless tobacco: Never   Substance and Sexual Activity    Alcohol use: Yes     Alcohol/week: 0.0 standard drinks     Comment: beer once every 3 months    Drug use: No    Sexual activity: Yes     Partners: Female   Social History Narrative     and lives with wife and twin daughters.  Works at [employer].  Enjoys hunting and fishing.  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        Review of Systems   Constitutional: Negative.   HENT: Negative.     Eyes: Negative.    Cardiovascular: Negative.    Respiratory: Negative.     Endocrine: Negative.    Hematologic/Lymphatic: Negative.    Skin: Negative.    Musculoskeletal:  Positive for joint pain and joint swelling. Negative for arthritis, back pain, falls, muscle cramps, muscle weakness, myalgias, neck pain and stiffness.   Neurological: Negative.    Psychiatric/Behavioral: Negative.     Allergic/Immunologic: Negative.                  Objective:        General: Lopez is well-developed, well-nourished, appears stated age, in no acute distress, alert and oriented to time, place and person.     General    Nursing note and vitals reviewed.  Constitutional: He is oriented to person, place, and time. He appears well-developed and well-nourished. No distress.   HENT:   Head: Normocephalic and atraumatic.   Nose: Nose normal.   Eyes: EOM are normal.   Cardiovascular:  Normal rate and intact distal pulses.            Pulmonary/Chest: Effort normal. No respiratory distress.   Neurological: He is alert and oriented to person, place, and time.   Psychiatric: He has a normal mood and affect. His behavior is normal. Judgment and thought content normal.     General Musculoskeletal Exam   Gait: abnormal and antalgic        Right Knee Exam     Inspection   Erythema: absent  Scars: absent  Swelling: present  Effusion: present  Deformity: absent  Bruising: absent    Tenderness   The patient is tender to palpation of the patella and iliotibial band.    Crepitus   The patient has crepitus of the patella.    Range of Motion   Extension:  -5   Flexion:  130     Tests   Meniscus   Mary:  Medial - negative (For pain) Lateral - negative (for pain)  Ligament Examination   Lachman: normal (-1 to 2mm)   PCL-Posterior Drawer: normal (0 to 2mm)     MCL - Valgus: normal (0 to 2mm)  LCL - Varus: normal  Patella   Passive Patellar Tilt: neutral  Patellar Tracking: normal  Patellar Grind: positive    Other   Sensation: normal    Left Knee Exam     Inspection   Erythema: absent  Scars: absent  Swelling: absent  Effusion: absent  Deformity: absent  Bruising: absent    Tenderness   The patient is experiencing no tenderness (Posterior joint line).     Range of Motion   Extension:  -5   Flexion:  140     Tests   Meniscus   Mary:  Medial - negative (Pain only) Lateral - negative  Stability   Lachman: normal (-1 to 2mm)   PCL-Posterior Drawer: normal (0 to 2mm)  MCL - Valgus: normal (0 to 2mm)  LCL - Varus: normal (0 to 2mm)  Posterolateral Corner: stable  Patella   Passive Patellar Tilt: neutral  Patellar Tracking: normal    Other   Sensation: normal    Muscle Strength   Right Lower Extremity   Quadriceps:  5/5   Hamstrin/5   Left Lower Extremity   Quadriceps:  5/5   Hamstrin/5     Vascular Exam     Right Pulses  Dorsalis Pedis:      2+  Posterior Tibial:      2+        Left Pulses  Dorsalis Pedis:      2+  Posterior Tibial:      2+      Imaging:  X-rays of the left knee from 2021 personally reviewed by me 2022.  These include weight-bearing AP, lateral, and Merchant views.  Joint spaces are well preserved in all 3 compartments.    MRI of the left knee from 2021 personally reviewed by me 2022.  There is a  high-grade to tearing of the distal aspect of the posterior cruciate ligament.  There is a joint effusion.  The ACL and collateral ligaments are intact.  Mediolateral meniscus are intact.  There is grade 2-3 changes in the cartilage of the medial compartment and patellofemoral compartment, lateral compartment is unremarkable.    X-rays of bilateral knees from 06/28/2022 personally reviewed by me on that day.  Weight-bearing AP, PA flexion, lateral, and Merchant views.  There is an effusion on the left knee.  There are moderate arthritic changes in all 3 compartments on the right with osteophyte formation.  On the left knee, the joint spaces are well preserved with no osteophyte formation.    MRI left knee from 07/18/2022 personally reviewed by me on that day.  There was a complex tear involving the posterior horn of the medial meniscus with a vertical and horizontal.  There is no extension into the posterior or anterior root.  The lateral meniscus is intact including at the posterior and anterior roots.  The ACL is intact.  There was laxity in the PCL consistent his previous injury.  Collateral ligaments are intact.  Overall the cartilage was preserved in all 3 compartments.    MRI of the right knee from 02/09/2023 personally viewed by me 02/14/2023.  The medial and the lateral menisci have mucoid degeneration of the posterior horn but no discrete tears.  The cruciate and collateral ligaments are intact.  There is thinning of the articular cartilage over the weight-bearing portion of the medial femoral condyle, but overall it is fairly well-preserved.  Lateral compartment had intact cartilage.  Patellofemoral joint has a 25 x 25 mm area of complete, full-thickness chondromalacia to the patella.        Assessment:     Lopez Hicks is a 52 y.o. male with right knee osteoarthritis patellofemoral joint.  Encounter Diagnosis   Name Primary?    Osteoarthritis of right patellofemoral joint Yes          Plan:        The diagnosis and treatment options with the patient and all of his questions were answered.  I showed him the MRI and discuss the findings with him.  We will administer viscosupplementation injection, return to clinic for the injection were we will also aspirate the knee.  We will demonstrate him a home exercise program.      HEP 23381 - Meng TITUS , instructed and demonstrated a knee HEP. The patient then demonstrated understanding of exercises and proper technique. This program was performed for 15 minutes.         All of their questions were answered.  They will call the clinic with any questions or concerns in the interim.    Should the patient's symptoms worsen, persist, or fail to improve they should return for reevaluation and I would be happy to see them back anytime.        Sukumar Agosto M.D.    Please be aware that this note has been generated with the assistance of [employer] voice-to-text.  Please excuse any spelling or grammatical errors.    Thank you for choosing Dr. Sukumar Agosto for your sports medicine care. It is our goal to provide you with exceptional care that will help keep you healthy, active, and get you back in the game.     If you felt that you received exemplary care today, please consider leaving feedback for Dr. Agosto on [employer] at [email or web address].    Please do not hesitate to reach out to us via email, phone, or MyChart with any questions, concerns, or feedback.

## 2023-03-06 DIAGNOSIS — M17.11 OSTEOARTHRITIS OF RIGHT PATELLOFEMORAL JOINT: Primary | ICD-10-CM

## 2023-03-10 ENCOUNTER — PATIENT OUTREACH (OUTPATIENT)
Dept: ADMINISTRATIVE | Facility: HOSPITAL | Age: 53
End: 2023-03-10
Payer: COMMERCIAL

## 2023-03-10 NOTE — PROGRESS NOTES
Health Maintenance Due   Topic Date Due    Hepatitis C Screening  Never done    HIV Screening  Never done    Colorectal Cancer Screening  Never done    Shingles Vaccine (1 of 2) Never done    Eye Exam  12/17/2019    Pneumococcal Vaccines (Age 0-64) (2 - PPSV23 if available, else PCV20) 09/08/2020    COVID-19 Vaccine (3 - Booster for Pfizer series) 06/09/2021    Foot Exam  08/03/2022    Influenza Vaccine (1) 09/01/2022     Chart reviewed.   Immunizations: Reconciled  Orders placed: N/A  Upcoming appts to satisfy CHASE topics: N/A  Appointment note placed for Colonoscopy.

## 2023-03-15 ENCOUNTER — PATIENT OUTREACH (OUTPATIENT)
Dept: ADMINISTRATIVE | Facility: HOSPITAL | Age: 53
End: 2023-03-15
Payer: COMMERCIAL

## 2023-03-15 ENCOUNTER — PATIENT MESSAGE (OUTPATIENT)
Dept: ADMINISTRATIVE | Facility: HOSPITAL | Age: 53
End: 2023-03-15
Payer: COMMERCIAL

## 2023-03-21 ENCOUNTER — PATIENT OUTREACH (OUTPATIENT)
Dept: ADMINISTRATIVE | Facility: HOSPITAL | Age: 53
End: 2023-03-21
Payer: COMMERCIAL

## 2023-03-21 ENCOUNTER — OFFICE VISIT (OUTPATIENT)
Dept: SPORTS MEDICINE | Facility: CLINIC | Age: 53
End: 2023-03-21
Payer: COMMERCIAL

## 2023-03-21 VITALS — HEIGHT: 76 IN | WEIGHT: 269.75 LBS | BODY MASS INDEX: 32.85 KG/M2

## 2023-03-21 DIAGNOSIS — M17.11 OSTEOARTHRITIS OF RIGHT PATELLOFEMORAL JOINT: Primary | ICD-10-CM

## 2023-03-21 PROCEDURE — 20610 DRAIN/INJ JOINT/BURSA W/O US: CPT | Mod: RT,S$GLB,, | Performed by: STUDENT IN AN ORGANIZED HEALTH CARE EDUCATION/TRAINING PROGRAM

## 2023-03-21 PROCEDURE — 20610 LARGE JOINT ASPIRATION/INJECTION: R KNEE: ICD-10-PCS | Mod: RT,S$GLB,, | Performed by: STUDENT IN AN ORGANIZED HEALTH CARE EDUCATION/TRAINING PROGRAM

## 2023-03-21 PROCEDURE — 99999 PR PBB SHADOW E&M-EST. PATIENT-LVL IV: CPT | Mod: PBBFAC,,, | Performed by: STUDENT IN AN ORGANIZED HEALTH CARE EDUCATION/TRAINING PROGRAM

## 2023-03-21 PROCEDURE — 99499 NO LOS: ICD-10-PCS | Mod: S$GLB,,, | Performed by: STUDENT IN AN ORGANIZED HEALTH CARE EDUCATION/TRAINING PROGRAM

## 2023-03-21 PROCEDURE — 99499 UNLISTED E&M SERVICE: CPT | Mod: S$GLB,,, | Performed by: STUDENT IN AN ORGANIZED HEALTH CARE EDUCATION/TRAINING PROGRAM

## 2023-03-21 PROCEDURE — 99999 PR PBB SHADOW E&M-EST. PATIENT-LVL IV: ICD-10-PCS | Mod: PBBFAC,,, | Performed by: STUDENT IN AN ORGANIZED HEALTH CARE EDUCATION/TRAINING PROGRAM

## 2023-03-21 NOTE — PROCEDURES
Large Joint Aspiration/Injection: R knee    Date/Time: 3/21/2023 11:15 AM  Performed by: Sukumar Agosto MD  Authorized by: Sukumar Agosto MD     Consent Done?:  Yes (Verbal)  Indications:  Pain, joint swelling and diagnostic evaluation  Site marked: the procedure site was marked    Timeout: prior to procedure the correct patient, procedure, and site was verified    Prep: patient was prepped and draped in usual sterile fashion      Local anesthesia used?: Yes    Local anesthetic:  Lidocaine spray    Details:  Needle Size:  21 G  Approach:  Anteromedial  Location:  Knee  Site:  R knee  Medications:  10 mg sodium hyaluronate (EUFLEXXA) 10 mg/mL(mw 2.4 -3.6 million)  Patient tolerance:  Patient tolerated the procedure well with no immediate complications

## 2023-03-21 NOTE — LETTER
AUTHORIZATION FOR RELEASE OF   CONFIDENTIAL INFORMATION    Dear Release of records,    We are seeing Lopez Hicks, date of birth 1970, in the clinic at [facility]. Sukumar Mackey DO is the patient's PCP. Lopez Hicks has an outstanding lab/procedure at the time we reviewed his chart. In order to help keep his health information updated, he has authorized us to request the following medical record(s):        (  )  MAMMOGRAM                                      (  )  COLONOSCOPY      (  )  PAP SMEAR                                          (  )  OUTSIDE LAB RESULTS     (  )  DEXA SCAN                                          ( x )  EYE EXAM            (  )  FOOT EXAM                                          (  )  ENTIRE RECORD     (  )  OUTSIDE IMMUNIZATIONS                 (  )  _______________         Please fax records to Sukumar Mackey DO, [phone]     If you have any questions, please contact [facility] at [phone].           Patient Name: Lopez Hicks  : 1970  Patient Phone #: [phone]

## 2023-03-21 NOTE — PROGRESS NOTES
Lopez Hicks is a 52 y.o. male presents for 1/3 Euflexxa injection to right knee.    See procedure note for details.    Pseudo-septic reaction discussed.    Return to clinic next week for the next injection.      Additionally, we will perform brace fitting and issue him a 3D knee sleeve for his right knee.      [id number] Diane TITUS , performed a custom orthotic / brace adjustment, fitting and training with the patient. The patient demonstrated understanding and proper care. This was performed for 15 minutes.

## 2023-03-28 ENCOUNTER — OFFICE VISIT (OUTPATIENT)
Dept: SPORTS MEDICINE | Facility: CLINIC | Age: 53
End: 2023-03-28
Payer: COMMERCIAL

## 2023-03-28 VITALS — WEIGHT: 268.94 LBS | HEIGHT: 76 IN | BODY MASS INDEX: 32.75 KG/M2

## 2023-03-28 DIAGNOSIS — M17.11 OSTEOARTHRITIS OF RIGHT PATELLOFEMORAL JOINT: Primary | ICD-10-CM

## 2023-03-28 PROCEDURE — 20610 LARGE JOINT ASPIRATION/INJECTION: R KNEE: ICD-10-PCS | Mod: RT,S$GLB,, | Performed by: STUDENT IN AN ORGANIZED HEALTH CARE EDUCATION/TRAINING PROGRAM

## 2023-03-28 PROCEDURE — 99999 PR PBB SHADOW E&M-EST. PATIENT-LVL IV: ICD-10-PCS | Mod: PBBFAC,,, | Performed by: STUDENT IN AN ORGANIZED HEALTH CARE EDUCATION/TRAINING PROGRAM

## 2023-03-28 PROCEDURE — 99499 UNLISTED E&M SERVICE: CPT | Mod: S$GLB,,, | Performed by: STUDENT IN AN ORGANIZED HEALTH CARE EDUCATION/TRAINING PROGRAM

## 2023-03-28 PROCEDURE — 99999 PR PBB SHADOW E&M-EST. PATIENT-LVL IV: CPT | Mod: PBBFAC,,, | Performed by: STUDENT IN AN ORGANIZED HEALTH CARE EDUCATION/TRAINING PROGRAM

## 2023-03-28 PROCEDURE — 20610 DRAIN/INJ JOINT/BURSA W/O US: CPT | Mod: RT,S$GLB,, | Performed by: STUDENT IN AN ORGANIZED HEALTH CARE EDUCATION/TRAINING PROGRAM

## 2023-03-28 PROCEDURE — 99499 NO LOS: ICD-10-PCS | Mod: S$GLB,,, | Performed by: STUDENT IN AN ORGANIZED HEALTH CARE EDUCATION/TRAINING PROGRAM

## 2023-03-30 ENCOUNTER — TELEPHONE (OUTPATIENT)
Dept: SPORTS MEDICINE | Facility: CLINIC | Age: 53
End: 2023-03-30
Payer: COMMERCIAL

## 2023-03-30 NOTE — TELEPHONE ENCOUNTER
Spoke with patient to inform him that Garrick Alejandra PA-C will not be in the office on Tuesday 04/04/2023, patient is rescheduled to Monday 04/03/2023 at 1:00 pm at [facility].

## 2023-03-30 NOTE — TELEPHONE ENCOUNTER
----- Message from Rebecca Poon sent at 3/30/2023  2:09 PM CDT -----  Regarding: advice / appt access  Contact: self @ [phone]  Pt is returning Lesly's call concerning his appt on 4-4-23 being booked out and rescheduled for 4-3-23.  He is calling to see if the new appt will be at the same time and location.  Pls call to clarify.

## 2023-03-30 NOTE — TELEPHONE ENCOUNTER
Called and left a message for patient to return my call to get him rescheduled for Monday 04/03/2023 due to Garrick Alejandra PA-C not being in clinic on Tuesday 04/04/2023.

## 2023-04-03 ENCOUNTER — OFFICE VISIT (OUTPATIENT)
Dept: SPORTS MEDICINE | Facility: CLINIC | Age: 53
End: 2023-04-03
Payer: COMMERCIAL

## 2023-04-03 VITALS
BODY MASS INDEX: 33.37 KG/M2 | DIASTOLIC BLOOD PRESSURE: 80 MMHG | WEIGHT: 274.06 LBS | HEART RATE: 80 BPM | HEIGHT: 76 IN | SYSTOLIC BLOOD PRESSURE: 133 MMHG

## 2023-04-03 DIAGNOSIS — M17.11 OSTEOARTHRITIS OF RIGHT PATELLOFEMORAL JOINT: Primary | ICD-10-CM

## 2023-04-03 PROCEDURE — 99999 PR PBB SHADOW E&M-EST. PATIENT-LVL IV: CPT | Mod: PBBFAC,,, | Performed by: ORTHOPAEDIC SURGERY

## 2023-04-03 PROCEDURE — 20610 DRAIN/INJ JOINT/BURSA W/O US: CPT | Mod: RT,S$GLB,, | Performed by: ORTHOPAEDIC SURGERY

## 2023-04-03 PROCEDURE — 99999 PR PBB SHADOW E&M-EST. PATIENT-LVL IV: ICD-10-PCS | Mod: PBBFAC,,, | Performed by: ORTHOPAEDIC SURGERY

## 2023-04-03 PROCEDURE — 99499 NO LOS: ICD-10-PCS | Mod: S$GLB,,, | Performed by: ORTHOPAEDIC SURGERY

## 2023-04-03 PROCEDURE — 20610 LARGE JOINT ASPIRATION/INJECTION: R KNEE: ICD-10-PCS | Mod: RT,S$GLB,, | Performed by: ORTHOPAEDIC SURGERY

## 2023-04-03 PROCEDURE — 99499 UNLISTED E&M SERVICE: CPT | Mod: S$GLB,,, | Performed by: ORTHOPAEDIC SURGERY

## 2023-04-03 NOTE — PROGRESS NOTES
Patient is here for follow up of Right knee arthritis. Pt is requesting Erflexxa#3 for Right knee.  [facility] reviewed per encounter record. Has failed other conservative modalities including NSAIDS, activity modification, weight loss.    The prior shots were tolerated well.    PHYSICAL EXAMINATION:     General: The patient is alert and oriented x 3. Mood is pleasant.   Observation of ears, eyes and nose reveals no gross abnormalities. No   labored breathing observed.     No signs of infection or adverse reaction to knee.    Patient had no adverse reactions to the medication. Pain decreased. Patient was instructed to apply ice to the joint for 20 minutes and avoid strenuous activities for 24-36 hours following the injection. They were warned of possible blood sugar and/or blood pressure changes during that time. Following that time, they can resume regular activities.    Patient was reminded to call the clinic immediately for any adverse side effects as explained in clinic today.      RTC to see ZEESHAN Isidron.    All questions were answered, pt will contact us for questions or concerns in the interim.

## 2023-04-03 NOTE — PROCEDURES
Large Joint Aspiration/Injection: R knee    Date/Time: 4/3/2023 1:00 PM  Performed by: Garrick Alejandra PA-C  Authorized by: Garrick Alejandra PA-C     Consent Done?:  Yes (Verbal)  Indications:  Arthritis and pain  Site marked: the procedure site was marked    Timeout: prior to procedure the correct patient, procedure, and site was verified    Prep: patient was prepped and draped in usual sterile fashion      Local anesthesia used?: Yes    Local anesthetic:  Lidocaine spray    Details:  Needle Size:  22 G  Ultrasonic Guidance for needle placement?: No    Approach:  Anterolateral  Location:  Knee  Site:  R knee  Medications:  10 mg sodium hyaluronate (EUFLEXXA) 10 mg/mL(mw 2.4 -3.6 million)  Patient tolerance:  Patient tolerated the procedure well with no immediate complications

## 2023-04-18 NOTE — TELEPHONE ENCOUNTER
----- Message from Tani Melton MA sent at 4/18/2023 10:56 AM CDT -----  Contact: Jose @ [phone]  Requesting an RX refill or new RX.    RX name and strength: losartan (COZAAR) 100 MG tablet    Is this a refill or new RX: Refill    Is this a 30 day or 90 day RX: N/A    Pharmacy name and phone:     [id number] - [place] - [id number] [address] [address] [facility] [address] [id number] [address]  [place] [id number]  Phone: [phone] Fax: [phone]

## 2023-04-18 NOTE — TELEPHONE ENCOUNTER
No new care gaps identified.  [facility] Embedded Care Gaps. Reference number: [id number]. 4/18/2023   4:27:45 PM CDT

## 2023-04-19 RX ORDER — LOSARTAN POTASSIUM 100 MG/1
100 TABLET ORAL DAILY
Qty: 90 TABLET | Refills: 3 | Status: SHIPPED | OUTPATIENT
Start: 2023-04-19 | End: 2024-03-12

## 2023-04-21 ENCOUNTER — PATIENT MESSAGE (OUTPATIENT)
Dept: ADMINISTRATIVE | Facility: HOSPITAL | Age: 53
End: 2023-04-21
Payer: COMMERCIAL

## 2023-04-24 ENCOUNTER — PATIENT OUTREACH (OUTPATIENT)
Dept: ADMINISTRATIVE | Facility: HOSPITAL | Age: 53
End: 2023-04-24
Payer: COMMERCIAL

## 2023-04-24 NOTE — PROGRESS NOTES
Health Maintenance Due   Topic Date Due    Hepatitis C Screening  Never done    HIV Screening  Never done    Colorectal Cancer Screening  Never done    Shingles Vaccine (1 of 2) Never done    Eye Exam  12/17/2019    Pneumococcal Vaccines (Age 0-64) (2 - PPSV23 if available, else PCV20) 09/08/2020    COVID-19 Vaccine (3 - Booster for Pfizer series) 06/09/2021    Foot Exam  08/03/2022    Influenza Vaccine (1) 09/01/2022    Hemoglobin A1c  04/09/2023     Chart reviewed.   Immunizations: Reconciled  Orders placed: N/A  Upcoming appts to satisfy CHASE topics: N/A

## 2023-05-12 ENCOUNTER — PATIENT MESSAGE (OUTPATIENT)
Dept: ENDOCRINOLOGY | Facility: CLINIC | Age: 53
End: 2023-05-12
Payer: COMMERCIAL

## 2023-05-12 NOTE — TELEPHONE ENCOUNTER
Rx for Mounjaro 7.5 mg sent with 1 refill.   Please schedule f/u appt with labs prior. Make sure to bring written logs to visit.   No more refills will be sent on Mounjaro until seen in clinic. He missed his last appt in April.   
[Negative] : Heme/Lymph

## 2023-05-19 DIAGNOSIS — E11.42 TYPE 2 DIABETES MELLITUS WITH DIABETIC POLYNEUROPATHY, WITH LONG-TERM CURRENT USE OF INSULIN: ICD-10-CM

## 2023-05-19 DIAGNOSIS — Z79.4 TYPE 2 DIABETES MELLITUS WITH DIABETIC POLYNEUROPATHY, WITH LONG-TERM CURRENT USE OF INSULIN: ICD-10-CM

## 2023-05-19 RX ORDER — INSULIN DEGLUDEC 200 U/ML
INJECTION, SOLUTION SUBCUTANEOUS
Qty: 9 PEN | Refills: 1 | Status: SHIPPED | OUTPATIENT
Start: 2023-05-19 | End: 2023-11-27

## 2023-05-19 RX ORDER — INSULIN LISPRO 100 [IU]/ML
INJECTION, SOLUTION INTRAVENOUS; SUBCUTANEOUS
Qty: 15 ML | Refills: 1 | Status: SHIPPED | OUTPATIENT
Start: 2023-05-19 | End: 2023-08-28

## 2023-05-22 ENCOUNTER — PATIENT MESSAGE (OUTPATIENT)
Dept: ENDOCRINOLOGY | Facility: CLINIC | Age: 53
End: 2023-05-22
Payer: COMMERCIAL

## 2023-05-22 ENCOUNTER — OFFICE VISIT (OUTPATIENT)
Dept: SPORTS MEDICINE | Facility: CLINIC | Age: 53
End: 2023-05-22
Payer: COMMERCIAL

## 2023-05-22 VITALS
SYSTOLIC BLOOD PRESSURE: 133 MMHG | HEART RATE: 80 BPM | HEIGHT: 76 IN | WEIGHT: 277.44 LBS | DIASTOLIC BLOOD PRESSURE: 80 MMHG | BODY MASS INDEX: 33.78 KG/M2

## 2023-05-22 DIAGNOSIS — E11.42 TYPE 2 DIABETES MELLITUS WITH DIABETIC POLYNEUROPATHY, WITH LONG-TERM CURRENT USE OF INSULIN: Primary | ICD-10-CM

## 2023-05-22 DIAGNOSIS — Z79.4 TYPE 2 DIABETES MELLITUS WITH DIABETIC POLYNEUROPATHY, WITH LONG-TERM CURRENT USE OF INSULIN: Primary | ICD-10-CM

## 2023-05-22 DIAGNOSIS — M17.11 OSTEOARTHRITIS OF RIGHT PATELLOFEMORAL JOINT: Primary | ICD-10-CM

## 2023-05-22 PROCEDURE — 20610 LARGE JOINT ASPIRATION/INJECTION: R KNEE: ICD-10-PCS | Mod: RT,S$GLB,, | Performed by: STUDENT IN AN ORGANIZED HEALTH CARE EDUCATION/TRAINING PROGRAM

## 2023-05-22 PROCEDURE — 99214 PR OFFICE/OUTPT VISIT, EST, LEVL IV, 30-39 MIN: ICD-10-PCS | Mod: 25,S$GLB,, | Performed by: STUDENT IN AN ORGANIZED HEALTH CARE EDUCATION/TRAINING PROGRAM

## 2023-05-22 PROCEDURE — 99214 OFFICE O/P EST MOD 30 MIN: CPT | Mod: 25,S$GLB,, | Performed by: STUDENT IN AN ORGANIZED HEALTH CARE EDUCATION/TRAINING PROGRAM

## 2023-05-22 PROCEDURE — 99999 PR PBB SHADOW E&M-EST. PATIENT-LVL IV: ICD-10-PCS | Mod: PBBFAC,,, | Performed by: STUDENT IN AN ORGANIZED HEALTH CARE EDUCATION/TRAINING PROGRAM

## 2023-05-22 PROCEDURE — 20610 DRAIN/INJ JOINT/BURSA W/O US: CPT | Mod: RT,S$GLB,, | Performed by: STUDENT IN AN ORGANIZED HEALTH CARE EDUCATION/TRAINING PROGRAM

## 2023-05-22 PROCEDURE — 99999 PR PBB SHADOW E&M-EST. PATIENT-LVL IV: CPT | Mod: PBBFAC,,, | Performed by: STUDENT IN AN ORGANIZED HEALTH CARE EDUCATION/TRAINING PROGRAM

## 2023-05-22 RX ORDER — TRIAMCINOLONE ACETONIDE 40 MG/ML
80 INJECTION, SUSPENSION INTRA-ARTICULAR; INTRAMUSCULAR
Status: DISCONTINUED | OUTPATIENT
Start: 2023-05-22 | End: 2023-05-22 | Stop reason: HOSPADM

## 2023-05-22 RX ADMIN — TRIAMCINOLONE ACETONIDE 80 MG: 40 INJECTION, SUSPENSION INTRA-ARTICULAR; INTRAMUSCULAR at 10:05

## 2023-05-22 NOTE — PROGRESS NOTES
Subjective:          Chief Complaint: Lopez Hicks is a 53 y.o. male who had concerns including Pain and Swelling of the Right Knee.    HPI 5/22/23:  Lopez Hicks is a 53 y.o. male returns for follow up evaluation for his tight knee pain. Since his last visit, he has completed a Visco supplementation series that he states has given home no relief. He rates his pain as a 6/10 at its worst primarily surrounding the right patella. He complains of swelling in the right knee that has not resolved. He describes having some catching in the patella during knee flexion and extension. He is currently performing his directed home exercise program. He denies any new injury or trauma.     HPI 02/14/2023:  Lopez Hicks is a 52 y.o. male returns today for follow-up for his right knee.  He would an MRI since last visit, see my detailed interpretation below.  Overall symptoms are essentially unchanged.  He continues to have pain mainly peripatellar.  He is here to discuss the MRI results and potential treatment options.    HPI 02/02/2023:  Lopez Hicks is a 52 y.o. male presents today for evaluation of his right knee.  He was seen prior for his contralateral left knee read effusion meniscus tear.  He had excellent resolution of the symptoms after aspiration and some rehabilitation.  The left knee is asymptomatic.  About 1 year ago began having some pain in the right knee and this is gradually worsened.  It has now gotten to the point where he has frequent buckling as well as catching of the knee.  He is also noticed swelling persist throughout the day.  This makes it difficult for him to perform basic activities of daily living.      HPI 7/18/2022:  Lopez Hicks is a 52 y.o. male returns to clinic today for follow-up for his left knee.  He was last seen about 3 weeks for physical appointment MRI was obtained, see my detailed interpretation.  At the time knee was also aspirated.  This provided  great relief his symptoms.  He now has near full motion of the knee.  Denies any mechanical symptoms still has medial joint line pain.  Denies any instability to the knee.  He is here today to discuss results and treatment options.    HPI 6/28/2022  Lopez Hicks is a 52 y.o. male presents for evaluation of left knee pain.  He was last seen by me about 4 and half months ago for a partial PCL injury, grade 2.  He had near 100% resolution of his pain and instability after course of physical therapy and transition to a home exercise program.  He did great with this.  Two days ago he had a twisting/turning injury to his left knee.  Denies hearing or feeling a pop.  The knee became swollen fairly quickly.  He now alternate between intense spurs of pain where he cannot bear weight verses mild aches.  He has noted steady crease range of motion and fullness sensation in his knee but denies any mechanical symptoms such as catching or locking.  Denies any instability.  The pain is located along the medial joint line and does not radiate.  Denies any numbness or paresthesias to the left lower extremity.    Pain  Associated symptoms include joint swelling. Pertinent negatives include no myalgias or neck pain.   Follow-up  Associated symptoms include joint swelling. Pertinent negatives include no myalgias or neck pain.   Swelling  Associated symptoms include joint swelling. Pertinent negatives include no myalgias or neck pain.   Past Medical History:   Diagnosis Date    Diabetes mellitus, type 2     GERD (gastroesophageal reflux disease)     Hyperlipidemia     Hyperthyroidism     Mild nonproliferative diabetic retinopathy 2015       Current Outpatient Medications on File Prior to Visit   Medication Sig Dispense Refill    amLODIPine (NORVASC) 10 MG tablet TAKE 1 TABLET BY MOUTH ONCE DAILY 90 tablet 3    aspirin (ECOTRIN) 81 MG EC tablet Take 81 mg by mouth once daily.      atenoloL (TENORMIN) 100 MG tablet TAKE 1 TABLET BY  "MOUTH ONCE DAILY 90 tablet 3    blood sugar diagnostic Strp Check blood glucose 4 times a day - before meals and at bedtime. 400 strip 3    blood-glucose sensor (DEXCOM G6 SENSOR) Stephanie Change sensor every 10 days 3 each 12    blood-glucose transmitter (DEXCOM G6 TRANSMITTER) Stephanie Change every 3 months 1 each 3    buPROPion (WELLBUTRIN SR) 200 MG SR12 TAKE ONE TABLET BY MOUTH 2 TIMES A  tablet 3    clindamycin (CLEOCIN) 300 MG capsule TAKE ONE CAPSULE BY MOUTH EVERY EIGHT HOURS 30 capsule 0    diabetic supplies, [employer]. Kit Please change sensor every 14 days. FreeStyle Kayleigh Sensor 30-day supply (2 sensors/month) 2 kit 11    empagliflozin (JARDIANCE) 10 mg tablet Take 1 tablet (10 mg total) by mouth once daily. 30 tablet 3    fenofibrate (TRICOR) 145 MG tablet TAKE 1 TABLET BY MOUTH ONCE DAILY 90 tablet 3    hydrALAZINE (APRESOLINE) 25 MG tablet Take 1 tablet (25 mg total) by mouth 3 (three) times daily. 90 tablet 11    hydroCHLOROthiazide (HYDRODIURIL) 25 MG tablet TAKE 1 TABLET BY MOUTH ONCE DAILY 90 tablet 1    insulin degludec (TRESIBA FLEXTOUCH U-200) 200 unit/mL (3 mL) insulin pen INJECT 70 UNITS SUBCUTANEOUSLY 2 TIMES A DAY 9 pen 1    insulin lispro (HUMALOG KWIKPEN INSULIN) 100 unit/mL pen INJECT 46 UNITS SUBCUTANEOUSLY BEFORE MEALS. 15 mL 1    losartan (COZAAR) 100 MG tablet Take 1 tablet (100 mg total) by mouth once daily. 90 tablet 3    metFORMIN (GLUCOPHAGE-XR) 500 MG ER 24hr tablet TAKE TWO TABLETS BY MOUTH TWICE DAILY WITH MEALS 360 tablet 2    multivitamin (THERAGRAN) per tablet Take 1 tablet by mouth once daily.      omeprazole (PRILOSEC) 40 MG capsule TAKE 1 CAPSULE BY MOUTH ONCE DAILY 90 capsule 3    ONETOUCH DELICA LANCETS 33 gauge Misc       pen needle, diabetic (BD TENA 2ND GEN PEN NEEDLE) 32 gauge x 5/32" Ndle USE ONE NEEDLE FOR INJECTION FOUR TIMES DAILY 400 each 3    pravastatin (PRAVACHOL) 40 MG tablet TAKE 1 TABLET BY MOUTH ONCE DAILY 90 tablet 3    sildenafil (VIAGRA) 100 MG " tablet       tirzepatide 7.5 mg/0.5 mL PnIj Inject 7.5 mg into the skin every 7 days. 4 pen 1    vitamin D (VITAMIN D3) 1000 units Tab Take 1,000 Units by mouth once daily.       No current facility-administered medications on file prior to visit.       Past Surgical History:   Procedure Laterality Date    CHOLECYSTECTOMY      LUNG LOBECTOMY Right     after severe pneumonia    NASAL/SINUS ENDOSCOPY      ROTATOR CUFF REPAIR Bilateral     Dr. JERRY Cardona, and Dr. Jung.       Family History   Problem Relation Age of Onset    Hypertension Mother     Diabetes Father     Hyperlipidemia Father     Hypertension Father     Heart disease Father     Prostate cancer Father 60        radioactive seed    Diabetes Paternal Grandmother        Social History     Socioeconomic History    Marital status:     Number of children: 3   Occupational History    Occupation:    Tobacco Use    Smoking status: Never    Smokeless tobacco: Never   Substance and Sexual Activity    Alcohol use: Yes     Alcohol/week: 0.0 standard drinks     Comment: beer once every 3 months    Drug use: No    Sexual activity: Yes     Partners: Female   Social History Narrative     and lives with wife and twin daughters.  Works at [employer].  Enjoys hunting and fishing.  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        Review of Systems   Constitutional: Negative.   HENT: Negative.     Eyes: Negative.    Cardiovascular: Negative.    Respiratory: Negative.     Endocrine: Negative.    Hematologic/Lymphatic: Negative.    Skin: Negative.    Musculoskeletal:  Positive for joint pain and joint swelling. Negative for arthritis, back pain, falls, muscle cramps, muscle weakness, myalgias, neck pain and stiffness.   Neurological: Negative.    Psychiatric/Behavioral: Negative.     Allergic/Immunologic: Negative.                  Objective:        General: Lopez is well-developed, well-nourished, appears stated age, in no acute  distress, alert and oriented to time, place and person.     General    Nursing note and vitals reviewed.  Constitutional: He is oriented to person, place, and time. He appears well-developed and well-nourished. No distress.   HENT:   Head: Normocephalic and atraumatic.   Nose: Nose normal.   Eyes: EOM are normal.   Cardiovascular:  Normal rate and intact distal pulses.            Pulmonary/Chest: Effort normal. No respiratory distress.   Neurological: He is alert and oriented to person, place, and time.   Psychiatric: He has a normal mood and affect. His behavior is normal. Judgment and thought content normal.     General Musculoskeletal Exam   Gait: abnormal and antalgic       Right Knee Exam     Inspection   Erythema: absent  Scars: absent  Swelling: present  Effusion: present  Deformity: absent  Bruising: absent    Tenderness   The patient is tender to palpation of the patella.    Crepitus   The patient has crepitus of the patella.    Range of Motion   Extension:  -5   Flexion:  130     Tests   Meniscus   Mary:  Medial - negative (For pain) Lateral - negative (for pain)  Ligament Examination   Lachman: normal (-1 to 2mm)   PCL-Posterior Drawer: normal (0 to 2mm)     MCL - Valgus: normal (0 to 2mm)  LCL - Varus: normal  Patella   Passive Patellar Tilt: neutral  Patellar Tracking: normal  Patellar Grind: positive    Other   Sensation: normal    Left Knee Exam     Inspection   Erythema: absent  Scars: absent  Swelling: absent  Effusion: absent  Deformity: absent  Bruising: absent    Tenderness   The patient is experiencing no tenderness (Posterior joint line).     Range of Motion   Extension:  -5   Flexion:  140     Tests   Meniscus   Mary:  Medial - negative (Pain only) Lateral - negative  Stability   Lachman: normal (-1 to 2mm)   PCL-Posterior Drawer: normal (0 to 2mm)  MCL - Valgus: normal (0 to 2mm)  LCL - Varus: normal (0 to 2mm)  Posterolateral Corner: stable  Patella   Passive Patellar Tilt:  neutral  Patellar Tracking: normal    Other   Sensation: normal    Muscle Strength   Right Lower Extremity   Quadriceps:  5/5   Hamstrin/5   Left Lower Extremity   Quadriceps:  5/5   Hamstrin/5     Vascular Exam     Right Pulses  Dorsalis Pedis:      2+  Posterior Tibial:      2+        Left Pulses  Dorsalis Pedis:      2+  Posterior Tibial:      2+      Imaging:  X-rays of the left knee from 2021 personally reviewed by me 2022.  These include weight-bearing AP, lateral, and Merchant views.  Joint spaces are well preserved in all 3 compartments.    MRI of the left knee from 2021 personally reviewed by me 2022.  There is a high-grade to tearing of the distal aspect of the posterior cruciate ligament.  There is a joint effusion.  The ACL and collateral ligaments are intact.  Mediolateral meniscus are intact.  There is grade 2-3 changes in the cartilage of the medial compartment and patellofemoral compartment, lateral compartment is unremarkable.    X-rays of bilateral knees from 2022 personally reviewed by me on that day.  Weight-bearing AP, PA flexion, lateral, and Merchant views.  There is an effusion on the left knee.  There are moderate arthritic changes in all 3 compartments on the right with osteophyte formation.  On the left knee, the joint spaces are well preserved with no osteophyte formation.    MRI left knee from 2022 personally reviewed by me on that day.  There was a complex tear involving the posterior horn of the medial meniscus with a vertical and horizontal.  There is no extension into the posterior or anterior root.  The lateral meniscus is intact including at the posterior and anterior roots.  The ACL is intact.  There was laxity in the PCL consistent his previous injury.  Collateral ligaments are intact.  Overall the cartilage was preserved in all 3 compartments.    MRI of the right knee from 2023 personally viewed by me 2023.  The medial  and the lateral menisci have mucoid degeneration of the posterior horn but no discrete tears.  The cruciate and collateral ligaments are intact.  There is thinning of the articular cartilage over the weight-bearing portion of the medial femoral condyle, but overall it is fairly well-preserved.  Lateral compartment had intact cartilage.  Patellofemoral joint has a 25 x 25 mm area of complete, full-thickness chondromalacia to the patella.        Assessment:     Lopez Hicks is a 53 y.o. male with right knee osteoarthritis patellofemoral joint.  Encounter Diagnosis   Name Primary?    Osteoarthritis of right patellofemoral joint Yes            Plan:       Unfortunately, he did not get good relief with viscosupplementation injection.  I explained that he has severe, large full-thickness chondral defect of the patella.  We offered how to proceed from here.  Based on the MRI, the only full-thickness chondral defect is noted to the patella.  I stated that the most aggressive option would be to proceed with a possible staged TERRY procedure,.  The procedure, as well as the risks, benefits, rehabilitation protocol were discussed at length.  However, he was a bit concerned about this given his age.  He inquired about total versus partial knee arthroplasty.  I explained I do not perform this procedure but I can refer him to a colleague for further discussion.  We will administer a corticosteroid injection today.  I will refer him for discussion about possible knee arthroplasty.        All of their questions were answered.  They will call the clinic with any questions or concerns in the interim.    Should the patient's symptoms worsen, persist, or fail to improve they should return for reevaluation and I would be happy to see them back anytime.        Sukumar Agosto M.D.    Please be aware that this note has been generated with the assistance of [employer] voice-to-text.  Please excuse any spelling or grammatical  errors.    Thank you for choosing Dr. Sukumar Agosto for your sports medicine care. It is our goal to provide you with exceptional care that will help keep you healthy, active, and get you back in the game.     If you felt that you received exemplary care today, please consider leaving feedback for Dr. Agosto on [employer] at [email or web address].    Please do not hesitate to reach out to us via email, phone, or MyChart with any questions, concerns, or feedback.

## 2023-05-22 NOTE — TELEPHONE ENCOUNTER
Called the pharmacy, unable to give diagnosis code over the phone. Please resend prescription with diabetic diagnosis code

## 2023-05-22 NOTE — PROCEDURES
Large Joint Aspiration/Injection: R knee    Date/Time: 5/22/2023 10:15 AM  Performed by: Sukumar Agosto MD  Authorized by: Sukumar Agosto MD     Consent Done?:  Yes (Verbal)  Indications:  Diagnostic evaluation and pain  Site marked: the procedure site was marked    Timeout: prior to procedure the correct patient, procedure, and site was verified    Prep: patient was prepped and draped in usual sterile fashion      Local anesthesia used?: Yes    Local anesthetic:  Lidocaine spray, lidocaine 2% without epinephrine and bupivacaine 0.25% without epinephrine  Anesthetic total (ml):  4      Details:  Needle Size:  22 G  Ultrasonic Guidance for needle placement?: No    Approach:  Anteromedial  Location:  Knee  Site:  R knee  Medications:  80 mg triamcinolone acetonide 40 mg/mL  Patient tolerance:  Patient tolerated the procedure well with no immediate complications

## 2023-05-29 ENCOUNTER — PATIENT MESSAGE (OUTPATIENT)
Dept: ENDOCRINOLOGY | Facility: CLINIC | Age: 53
End: 2023-05-29
Payer: COMMERCIAL

## 2023-05-29 DIAGNOSIS — Z79.4 TYPE 2 DIABETES MELLITUS WITH DIABETIC POLYNEUROPATHY, WITH LONG-TERM CURRENT USE OF INSULIN: ICD-10-CM

## 2023-05-29 DIAGNOSIS — E11.42 TYPE 2 DIABETES MELLITUS WITH DIABETIC POLYNEUROPATHY, WITH LONG-TERM CURRENT USE OF INSULIN: ICD-10-CM

## 2023-06-05 ENCOUNTER — TELEPHONE (OUTPATIENT)
Dept: ORTHOPEDICS | Facility: CLINIC | Age: 53
End: 2023-06-05
Payer: COMMERCIAL

## 2023-06-05 NOTE — TELEPHONE ENCOUNTER
I called and spoke to the patient regarding the message below. I scheduled the patient on our next available appointment. The patient is also on the wait list incase something becomes available sooner.    The patient verbalized understanding and has no further questions.     ----- Message from Eran Lopez sent at 6/1/2023  8:18 AM CDT -----  Good morning,     Please call the patient and offer him an appointment with Dr. Carranza.     Thank you!    Sincerely,   Pito Lopez MS, OTC  OR & Clinical Assistant to Dr. Jt Carranza III  Phone: [phone]  Fax: [phone]    ----- Message -----  From: Sukumar Agosto MD  Sent: 5/31/2023   6:40 AM CDT  To: [facility] Staff    Can you call this patient to discuss possible PF arthroplasty vs TKA.  No relief with visco.    Thanks  Sukumar

## 2023-06-16 DIAGNOSIS — I15.2 HYPERTENSION ASSOCIATED WITH DIABETES: ICD-10-CM

## 2023-06-16 DIAGNOSIS — E11.9 TYPE 2 DIABETES MELLITUS WITHOUT COMPLICATION, UNSPECIFIED WHETHER LONG TERM INSULIN USE: Primary | ICD-10-CM

## 2023-06-16 DIAGNOSIS — E78.1 HYPERTRIGLYCERIDEMIA: Chronic | ICD-10-CM

## 2023-06-16 DIAGNOSIS — Z00.00 ANNUAL PHYSICAL EXAM: ICD-10-CM

## 2023-06-16 DIAGNOSIS — E11.59 HYPERTENSION ASSOCIATED WITH DIABETES: ICD-10-CM

## 2023-06-16 RX ORDER — ATENOLOL 100 MG/1
TABLET ORAL
Qty: 90 TABLET | Refills: 3 | Status: SHIPPED | OUTPATIENT
Start: 2023-06-16 | End: 2024-03-12

## 2023-06-19 NOTE — PROCEDURES
Large Joint Aspiration/Injection: R knee    Date/Time: 3/28/2023 11:15 AM  Performed by: Sukumar Agosto MD  Authorized by: Sukumar Agosto MD     Consent Done?:  Yes (Verbal)  Indications:  Pain, joint swelling and diagnostic evaluation  Site marked: the procedure site was marked    Timeout: prior to procedure the correct patient, procedure, and site was verified    Prep: patient was prepped and draped in usual sterile fashion      Local anesthesia used?: Yes    Local anesthetic:  Lidocaine spray    Details:  Needle Size:  21 G  Approach:  Anteromedial  Location:  Knee  Site:  R knee  Medications:  10 mg sodium hyaluronate (EUFLEXXA) 10 mg/mL(mw 2.4 -3.6 million)  Patient tolerance:  Patient tolerated the procedure well with no immediate complications   Expiration Date (Month Year): 05/2024

## 2023-06-30 ENCOUNTER — TELEPHONE (OUTPATIENT)
Dept: ORTHOPEDICS | Facility: CLINIC | Age: 53
End: 2023-06-30
Payer: COMMERCIAL

## 2023-06-30 NOTE — TELEPHONE ENCOUNTER
I called the patient regarding his appointment that is incorrectly scheduled. The patient did not answer. I left a voicemail explaining that it will need to be rescheduled and to give me a call back. I left my name and call back number.    ----- Message from Eran Lopez sent at 6/30/2023  8:09 AM CDT -----  Good morning,     You incorrectly scheduled this patient as an EP.     This patient has never seen Dr. Carranza before.     Please call the patient and reschedule as NP.     Thank you!    Sincerely,   Pito Lopez MS, OTC  OR & Clinical Assistant to Dr. Jt Carranza III  Phone: [phone]  Fax: [phone]

## 2023-07-19 ENCOUNTER — PATIENT MESSAGE (OUTPATIENT)
Dept: ORTHOPEDICS | Facility: CLINIC | Age: 53
End: 2023-07-19
Payer: COMMERCIAL

## 2023-07-19 DIAGNOSIS — M25.561 ACUTE PAIN OF RIGHT KNEE: ICD-10-CM

## 2023-07-19 DIAGNOSIS — M25.569 KNEE PAIN, UNSPECIFIED CHRONICITY, UNSPECIFIED LATERALITY: Primary | ICD-10-CM

## 2023-07-27 ENCOUNTER — PATIENT MESSAGE (OUTPATIENT)
Dept: ENDOCRINOLOGY | Facility: CLINIC | Age: 53
End: 2023-07-27
Payer: COMMERCIAL

## 2023-08-03 ENCOUNTER — OFFICE VISIT (OUTPATIENT)
Dept: ENDOCRINOLOGY | Facility: CLINIC | Age: 53
End: 2023-08-03
Payer: COMMERCIAL

## 2023-08-03 VITALS
DIASTOLIC BLOOD PRESSURE: 86 MMHG | TEMPERATURE: 98 F | SYSTOLIC BLOOD PRESSURE: 150 MMHG | WEIGHT: 271 LBS | BODY MASS INDEX: 32.99 KG/M2 | HEART RATE: 94 BPM

## 2023-08-03 DIAGNOSIS — E78.1 HYPERTRIGLYCERIDEMIA: ICD-10-CM

## 2023-08-03 DIAGNOSIS — R80.9 MICROALBUMINURIA: ICD-10-CM

## 2023-08-03 DIAGNOSIS — E11.42 TYPE 2 DIABETES MELLITUS WITH DIABETIC POLYNEUROPATHY, WITH LONG-TERM CURRENT USE OF INSULIN: Primary | ICD-10-CM

## 2023-08-03 DIAGNOSIS — E66.9 NON MORBID OBESITY, UNSPECIFIED OBESITY TYPE: ICD-10-CM

## 2023-08-03 DIAGNOSIS — Z79.4 TYPE 2 DIABETES MELLITUS WITH DIABETIC POLYNEUROPATHY, WITH LONG-TERM CURRENT USE OF INSULIN: Primary | ICD-10-CM

## 2023-08-03 PROCEDURE — 95251 CONT GLUC MNTR ANALYSIS I&R: CPT | Mod: S$GLB,,, | Performed by: NURSE PRACTITIONER

## 2023-08-03 PROCEDURE — 99999 PR PBB SHADOW E&M-EST. PATIENT-LVL V: ICD-10-PCS | Mod: PBBFAC,,, | Performed by: NURSE PRACTITIONER

## 2023-08-03 PROCEDURE — 99999 PR PBB SHADOW E&M-EST. PATIENT-LVL V: CPT | Mod: PBBFAC,,, | Performed by: NURSE PRACTITIONER

## 2023-08-03 PROCEDURE — 99214 OFFICE O/P EST MOD 30 MIN: CPT | Mod: S$GLB,,, | Performed by: NURSE PRACTITIONER

## 2023-08-03 PROCEDURE — 99214 PR OFFICE/OUTPT VISIT, EST, LEVL IV, 30-39 MIN: ICD-10-PCS | Mod: S$GLB,,, | Performed by: NURSE PRACTITIONER

## 2023-08-03 PROCEDURE — 95251 PR GLUCOSE MONITOR, 72 HOUR, PHYS INTERP: ICD-10-PCS | Mod: S$GLB,,, | Performed by: NURSE PRACTITIONER

## 2023-08-03 RX ORDER — BLOOD-GLUCOSE SENSOR
EACH MISCELLANEOUS
Qty: 9 EACH | Refills: 3 | Status: SHIPPED | OUTPATIENT
Start: 2023-08-03 | End: 2024-04-02 | Stop reason: SDUPTHER

## 2023-08-03 NOTE — PROGRESS NOTES
CC: This 53 y.o. White male  is here for evaluation of  T2DM along with comorbidities indicated in the Visit Diagnosis section of this encounter.    HPI: Lopez Hicks was diagnosed with T2DM in early 20s, diagnosed upon routine labs.           Prior visit 1/2023  Pt has not been seen for several months. He  had knee injury last year. A1c came down from 9.1% in April at lov to 7.7% in July, up again to 8.6% in Nov.   He did start Trulicity but stopped d/t bloating and nausea. Stopped pioglitazone d/t edema to ankles.   He started Dexcom CGM after lov and enjoys it over Kayleigh CGM.   + 9 lb weight gain since lov. Cites this is the heaviest he's been. Interested in medication to lose weight.   Plan Start Mounjaro 2.5 mg once weekly. Savings card provided.   Start Jardiance 10 mg once daily in AM for both glucose control and kidney protection.   Increase Tresiba to 70 units twice daily.   Increase Humalog to 46 units before each meal.   Continue monitoring glucoses with Dexcom CGM.   Return to clinic in in 3 months with labs prior. A1c today.     Interval hx  Pt has not been seen for several months. Has h/o poor f/u.   A1c is down from 8.8 to 6.7%. He has lost 24 lb since lov.     He cut down Tresiba dose from 70 units bid to now 20 units bid.   Skips Humalog often ac especially when more physically active. Averages Humalog 20 units BID in the morning without eating and again before supper. Usually skips before lunch. Previously was taking Humalog 46 units.         LAST DIABETES EDUCATION:     HOSPITALIZED FOR DIABETES -  No.     DIABETES MEDICATIONS:   Tresiba  - as above   Humalog - as above   Pioglitazone 15 mg once daily   metformin XR 1000 mg bid  Mounjaro 7.5 mg weekly   Jardiance 10 mg once daily -       Misses medication doses - Humalog as above. Not taking Jardiance, doesn't remember why he stopped.      Changes needle once a day.   Injecting to abdomen.     DM COMPLICATIONS: peripheral  neuropathy    SIGNIFICANT DIABETES MED HISTORY:   Trulicity 0.75 mg - nausea and bloating   Ozempic - acid reflux   Pioglitaonze 15 mg - edema to BLE     SELF MONITORING BLOOD GLUCOSE: monitors with Dexcom G6 CGM. See Media for CGM report.     CGM interpretation: overall glucoses are much improved but are above goal after lunch d/t lack of prandial insulin.            HYPOGLYCEMIC EPISODES: rare      CURRENT DIET: drinks unsweet tea and lemonade with AS. No soft drinks. Eats 2-3 meals/day, sometimes skips breakfast.  Snacking less, smaller portions.     Diet recall: dinner was 1 hamburger steak and 2 small potatoes, coleslaw, unsweet tea. 2 idalmis kisses. Pumpkin seeds.     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 Daughter is vegan.       BP (!) 150/86   Pulse 94   Temp 98.2 °F (36.8 °C)   Wt 122.9 kg (271 lb)   BMI 32.99 kg/m²       ROS:   CONSTITUTIONAL: Appetite good, denies fatigue  Denies n/v, constipation, or diarrhea.         PHYSICAL EXAM:  GENERAL: Well developed, well nourished. No acute distress.   PSYCH: AAOx3, appropriate mood and affect, conversant, well-groomed. Judgement and insight good.   NEURO: Cranial nerves grossly intact. Speech clear, no tremor.   CHEST: Respirations even and unlabored.   MS: Gait steady. No clubbing.         Hemoglobin A1C   Date Value Ref Range Status   07/29/2023 6.7 (H) 4.0 - 5.6 % Final     Comment:     ADA Screening Guidelines:  5.7-6.4%  Consistent with prediabetes  >or=6.5%  Consistent with diabetes    High levels of fetal hemoglobin interfere with the HbA1C  assay. Heterozygous hemoglobin variants (HbS, HgC, etc)do  not significantly interfere with this assay.   However, presence of multiple variants may affect accuracy.     01/09/2023 8.8 (H) 4.0 - 5.6 % Final     Comment:     ADA Screening Guidelines:  5.7-6.4%   Consistent with prediabetes  >or=6.5%  Consistent with diabetes    High levels of fetal hemoglobin interfere with the HbA1C  assay. Heterozygous hemoglobin variants (HbS, HgC, etc)do  not significantly interfere with this assay.   However, presence of multiple variants may affect accuracy.     11/14/2022 8.6 (H) 4.0 - 5.6 % Final     Comment:     ADA Screening Guidelines:  5.7-6.4%  Consistent with prediabetes  >or=6.5%  Consistent with diabetes    High levels of fetal hemoglobin interfere with the HbA1C  assay. Heterozygous hemoglobin variants (HbS, HgC, etc)do  not significantly interfere with this assay.   However, presence of multiple variants may affect accuracy.     11/14/2022 8.6 (H) 4.0 - 5.6 % Final     Comment:     ADA Screening Guidelines:  5.7-6.4%  Consistent with prediabetes  >or=6.5%  Consistent with diabetes    High levels of fetal hemoglobin interfere with the HbA1C  assay. Heterozygous hemoglobin variants (HbS, HgC, etc)do  not significantly interfere with this assay.   However, presence of multiple variants may affect accuracy.             Chemistry        Component Value Date/Time     11/14/2022 0707    K 3.8 11/14/2022 0707    CL 95 11/14/2022 0707    CO2 32 (H) 11/14/2022 0707    BUN 17 11/14/2022 0707    CREATININE 1.28 11/14/2022 0707     (H) 11/14/2022 0707        Component Value Date/Time    CALCIUM 10.1 11/14/2022 0707    ALKPHOS 58 11/14/2022 0707    AST 28 11/14/2022 0707    ALT 35 11/14/2022 0707    BILITOT 0.3 11/14/2022 0707    ESTGFRAFRICA >60.0 04/08/2022 0723    EGFRNONAA >60.0 04/08/2022 0723         Latest Reference Range & Units 11/14/22 07:07   BUN 2 - 20 mg/dL 17   Creatinine 0.50 - 1.40 mg/dL 1.28   eGFR >60 mL/min/1.73 m^2 >60.0       Lab Results   Component Value Date    LDLCALC 42.6 (L) 11/14/2022        Latest Reference Range & Units 04/08/22 07:23   Cholesterol 120 - 199 mg/dL  120 - 199 mg/dL 143 [1]  143 [2]   HDL 40 - 75 mg/dL  40 - 75 mg/dL 31 (L)  [3]  31 (L) [4]   HDL/Cholesterol Ratio 20.0 - 50.0 %  20.0 - 50.0 % 21.7  21.7   LDL Cholesterol External 63.0 - 159.0 mg/dL  63.0 - 159.0 mg/dL Invalid, Trig>400.0 [5]  Invalid, Trig>400.0 [6]   Non-HDL Cholesterol mg/dL 112 [7]  112 [8]   Total Cholesterol/HDL Ratio 2.0 - 5.0   2.0 - 5.0  4.6  4.6   Triglycerides 30 - 150 mg/dL  30 - 150 mg/dL 463 (H) [9]  463 (H) [10]       Lab Results   Component Value Date    MICALBCREAT 45.5 (H) 11/14/2022         ASSESSMENT and PLAN:    A1C GOAL: < 7 %     1. Type 2 diabetes mellitus with diabetic polyneuropathy, with long-term current use of insulin  A1c much lower than expected compared to Dexcom readings. Expect true A1c would be closer to mid 7's.     Increase Mounjaro to 10 mg once weekly.   Potential side effects: nausea, diarrhea, constipation, bloating. Nausea for the first week or two is common.  Avoid big food portions and greasy/heavy foods.     Continue Humalog at 20 units but make sure to take before lunch and before supper.   Do not recommend taking Humalog in the morning unless eating a meal.     If glucoses start dropping a few hours after Humalog consistently, then reduce from 20 to 16 units.     Continue Tresiba at same dose of 40 units per day -ok to inject 40 units ONCE  daily; start tomorrow night.     Continue metformin and pioglitazone.   Maintain healthy eating habits.   Return to clinic in 3 months with labs prior. - will discuss resuming Jardiance at next visit.     Hemoglobin A1C    Microalbumin/Creatinine Ratio, Urine    Lipid Panel      2. Non morbid obesity, unspecified obesity type  Continue Mounjaro.       3. Hypertriglyceridemia  Lipid Panel      4. Microalbuminuria  Microalbumin/Creatinine Ratio, Urine            Orders Placed This Encounter   Procedures    Hemoglobin A1C     Standing Status:   Future     Standing Expiration Date:   10/1/2024    Microalbumin/Creatinine Ratio, Urine     Standing Status:   Future     Standing Expiration Date:    10/1/2024     Order Specific Question:   Specimen Source     Answer:   Urine    Lipid Panel     Standing Status:   Future     Standing Expiration Date:   8/2/2024        Follow up in about 3 months (around 11/3/2023).

## 2023-08-03 NOTE — PATIENT INSTRUCTIONS
A1c much lower than expected compared to Dexcom readings. Expect true A1c would be closer to mid 7's.     Increase Mounjaro to 10 mg once weekly.   Potential side effects: nausea, diarrhea, constipation, bloating. Nausea for the first week or two is common.  Avoid big food portions and greasy/heavy foods.     Continue Humalog at 20 units but make sure to take before lunch and before supper.   Do not recommend taking Humalog in the morning unless eating a meal.     If glucoses start dropping a few hours after Humalog consistently, then reduce from 20 to 16 units.     Continue Tresiba at same dose of 40 units per day -ok to inject 40 units ONCE  daily; start tomorrow night.     Continue metformin and pioglitazone.   Maintain healthy eating habits.   Return to clinic in 3 months with labs prior. - will discuss resuming Jardiance at next visit.

## 2023-08-08 ENCOUNTER — HOSPITAL ENCOUNTER (OUTPATIENT)
Dept: RADIOLOGY | Facility: HOSPITAL | Age: 53
Discharge: HOME | End: 2023-08-08
Attending: ORTHOPAEDIC SURGERY
Payer: COMMERCIAL

## 2023-08-08 ENCOUNTER — OFFICE VISIT (OUTPATIENT)
Dept: ORTHOPEDICS | Facility: CLINIC | Age: 53
End: 2023-08-08
Payer: COMMERCIAL

## 2023-08-08 VITALS — HEIGHT: 75 IN | WEIGHT: 269.75 LBS | BODY MASS INDEX: 33.54 KG/M2

## 2023-08-08 DIAGNOSIS — M17.11 PATELLOFEMORAL ARTHRITIS OF RIGHT KNEE: Primary | ICD-10-CM

## 2023-08-08 DIAGNOSIS — M25.561 ACUTE PAIN OF RIGHT KNEE: ICD-10-CM

## 2023-08-08 DIAGNOSIS — M76.31 ILIOTIBIAL BAND TENDONITIS, RIGHT: ICD-10-CM

## 2023-08-08 DIAGNOSIS — M25.569 KNEE PAIN, UNSPECIFIED CHRONICITY, UNSPECIFIED LATERALITY: ICD-10-CM

## 2023-08-08 PROCEDURE — 73562 X-RAY EXAM OF KNEE 3: CPT | Mod: 26,LT,, | Performed by: RADIOLOGY

## 2023-08-08 PROCEDURE — 99999 PR PBB SHADOW E&M-EST. PATIENT-LVL II: ICD-10-PCS | Mod: PBBFAC,,, | Performed by: ORTHOPAEDIC SURGERY

## 2023-08-08 PROCEDURE — 99213 OFFICE O/P EST LOW 20 MIN: CPT | Mod: S$GLB,,, | Performed by: ORTHOPAEDIC SURGERY

## 2023-08-08 PROCEDURE — 73562 XR KNEE ORTHO RIGHT WITH FLEXION: ICD-10-PCS | Mod: 26,LT,, | Performed by: RADIOLOGY

## 2023-08-08 PROCEDURE — 73564 X-RAY EXAM KNEE 4 OR MORE: CPT | Mod: 26,RT,, | Performed by: RADIOLOGY

## 2023-08-08 PROCEDURE — 99213 PR OFFICE/OUTPT VISIT, EST, LEVL III, 20-29 MIN: ICD-10-PCS | Mod: S$GLB,,, | Performed by: ORTHOPAEDIC SURGERY

## 2023-08-08 PROCEDURE — 73564 X-RAY EXAM KNEE 4 OR MORE: CPT | Mod: TC,RT

## 2023-08-08 PROCEDURE — 73564 XR KNEE ORTHO RIGHT WITH FLEXION: ICD-10-PCS | Mod: 26,RT,, | Performed by: RADIOLOGY

## 2023-08-08 PROCEDURE — 99999 PR PBB SHADOW E&M-EST. PATIENT-LVL II: CPT | Mod: PBBFAC,,, | Performed by: ORTHOPAEDIC SURGERY

## 2023-08-08 RX ORDER — DEXTROMETHORPHAN HYDROBROMIDE, GUAIFENESIN 5; 100 MG/5ML; MG/5ML
1300 LIQUID ORAL DAILY PRN
COMMUNITY

## 2023-08-08 NOTE — PROGRESS NOTES
Subjective:     HPI:   Lopez Hicks is a 53 y.o. male who presents for eval R knee pain ref by Dr Agosto    He says his right knee is always given him trouble for several years.      Symptoms got worse in January or February of this year with swelling giving way especially getting in and out of a truck and with ladders predominantly anterior.      He is seen Dr. Looney he had a round of Euflexxa injections which sounds like minimal relief and then had a steroid injection which sounds like more relief.    Today he says his knee pain has been resolved for about a month it is doing much better today    Medications: Tylenol (650mg, PRN)    Injections: march to 2023 right knee Euflexxa Series with Dr. Agosto, then CSI 23  Hx of L knee CSI in the past    Physical Therapy: PT for L knee PCL tear in the past, did HEP for R knee    Bracing: Yes, sleeve, the patient stated that he was given a sleeve but has not worn it in a month because the pain has been better.     Assistive Devices: None.     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     Social support: The patient stated that they live at home with their wife. The patient stated that their wife would be able to help take care of them if they were to have surgery.        ROS:  The updated medical history is in the chart and has been reviewed. A review of systems is updated and there is no reported vision changes, ear/nose/mouth/throat complaints,  chest pain, shortness of breath, abdominal pain, urological complaints, fevers or chills, psychiatric complaints. Musculoskeletal and neurologcial symptoms are as documented. All other systems are negative.      Objective:   Exam:  There  were no vitals filed for this visit.  Body mass index is 33.71 kg/m².    Physical examination included assessment of the patient's general appearance with particular attention to development, nutrition, body habitus, attention to grooming, and any evidence of distress.  Constitutional: The patient is a well-developed, well-nourished patient in no acute distress.   Cardiovascular: Vascular examination included warmth and capillary refill as well inspection for edema and assessment of pedal pulses. Pulses are palpable and regular.  Musculoskeletal: Gait was assessed as to whether it was steady, non-antalgic, and/or required the use of an assist device. The patient was also asked to walk independently and get onto the examination table.  Skin: The skin was examined for any obvious rashes or lesions in the extremity.  Neurologic: Sensation is intact to light touch in the extremity. The patient has good coordination without hyperreflexia and is alert and oriented to person, place and time and has normal mood and affect.     All of the above were examined and found to be within normal limits except for the following pertinent clinical findings:    No limp nonantalgic gait negative Trendelenburg.  0-130 degree knee range of motion 5° valgus alignment he is tender palpitation at the anterior lateral joint line at the anterior aspect of the IT band nontender medial and patellofemoral joint lines he is no significant effusion knee stable anterior-posterior varus valgus stresses without flexion contracture or extensor lag.  Negative patellar grind no crepitus with patellar range of motion which tracks well.  No groin pain with straight leg raise no pain with active passive range of motion of his hip      Imaging:    KNEE R ARTHRITIS    Indication:  Right knee pain  Exam Ordered: Radiographs of the right knee include a standing anteroposterior view, a standing posterioanterior view, a lateral view in full flexion, and a sunrise  view  Details of Examination: Exam shows evidence of joint space narrowing, osteophyte formation, and subchondral sclerosis, all consistent with degenerative arthritis of the knee.  No other significant findings are noted.  Impression:  Degenerative Arthritis, Right Knee    Klg3-4 PF OA    MRI R knee 2/9/23:   There is mild medial and lateral knee joint compartment DJD.  There is severe chondromalacia patella most noticeable at the lateral patellar facet.  There is a knee joint effusion.       Assessment:       ICD-10-CM ICD-9-CM   1. Patellofemoral arthritis of right knee  M17.11 716.96   2. Iliotibial band tendonitis, right  M76.31 728.89      DM 6.7 (down from 8.8)  R lung lower lobectomy after PNA  OS A+ CPAP    Hx of L knee PCL tear     Plan:       The above findings were discussed with patient length. We discussed the risks of conservative versus surgical management knee arthritis. Conservative management consisting of anti-inflammatory medications, glucosamine/chondroitin sulfate, weight loss, physical therapy, activity modification, as well as injections (lubricant versus corticosteroid) was discussed at length. At this point considering the patient's level of activity, pain, and radiographic findings I recommend continued conservative management of the knee arthritis.     The patient was given a handout with treatment strategies for hip and knee joint care prior to surgery from [employer], the [employer].   This included information regarding medications, injections, weight loss, exercise, braces, physical therapy, and alternative therapies.     Symptoms improved with time and R knee CSI    On exam today: combination of PF OA + IT band    If returns: CSI IA + ant IT band + PT    Rec Avoid squats/activity modification to offload PF joint    Possible TONY PFJ candidate    F/u PRN    No orders of the defined types were placed in this encounter.            Past Medical History:    Diagnosis Date    Diabetes mellitus, type 2     GERD (gastroesophageal reflux disease)     Hyperlipidemia     Hyperthyroidism     Mild nonproliferative diabetic retinopathy        Past Surgical History:   Procedure Laterality Date    CHOLECYSTECTOMY      LUNG LOBECTOMY Right     after severe pneumonia    NASAL/SINUS ENDOSCOPY      ROTATOR CUFF REPAIR Bilateral     Dr. JERRY Cardona, and Dr. Jung.       Family History   Problem Relation Age of Onset    Hypertension Mother     Diabetes Father     Hyperlipidemia Father     Hypertension Father     Heart disease Father     Prostate cancer Father 60        radioactive seed    Diabetes Paternal Grandmother        Social History     Socioeconomic History    Marital status:     Number of children: 3   Occupational History    Occupation:    Tobacco Use    Smoking status: Never    Smokeless tobacco: Never   Substance and Sexual Activity    Alcohol use: Yes     Alcohol/week: 0.0 standard drinks of alcohol     Comment: beer once every 3 months    Drug use: No    Sexual activity: Yes     Partners: Female   Social History Narrative     and lives with wife and twin daughters.  Works at [employer].  Enjoys hunting and fishing.  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

## 2023-08-27 DIAGNOSIS — E11.42 TYPE 2 DIABETES MELLITUS WITH DIABETIC POLYNEUROPATHY, WITH LONG-TERM CURRENT USE OF INSULIN: ICD-10-CM

## 2023-08-27 DIAGNOSIS — Z79.4 TYPE 2 DIABETES MELLITUS WITH DIABETIC POLYNEUROPATHY, WITH LONG-TERM CURRENT USE OF INSULIN: ICD-10-CM

## 2023-08-28 RX ORDER — INSULIN LISPRO 100 [IU]/ML
20 INJECTION, SOLUTION INTRAVENOUS; SUBCUTANEOUS
Qty: 30 ML | Refills: 5 | Status: SHIPPED | OUTPATIENT
Start: 2023-08-28 | End: 2023-08-29

## 2023-08-29 DIAGNOSIS — E11.42 TYPE 2 DIABETES MELLITUS WITH DIABETIC POLYNEUROPATHY, WITH LONG-TERM CURRENT USE OF INSULIN: ICD-10-CM

## 2023-08-29 DIAGNOSIS — Z79.4 TYPE 2 DIABETES MELLITUS WITH DIABETIC POLYNEUROPATHY, WITH LONG-TERM CURRENT USE OF INSULIN: ICD-10-CM

## 2023-08-29 RX ORDER — INSULIN LISPRO 100 [IU]/ML
20 INJECTION, SOLUTION INTRAVENOUS; SUBCUTANEOUS
Qty: 30 ML | Refills: 5 | Status: SHIPPED | OUTPATIENT
Start: 2023-08-29 | End: 2024-02-12

## 2023-08-29 RX ORDER — METFORMIN HYDROCHLORIDE 500 MG/1
TABLET, EXTENDED RELEASE ORAL
Qty: 360 TABLET | Refills: 2 | Status: SHIPPED | OUTPATIENT
Start: 2023-08-29 | End: 2024-03-11

## 2023-08-29 NOTE — TELEPHONE ENCOUNTER
resend Humalog with updated instructions, states to Inject 20 Units into the skin 3 (three) times daily with meals. INJECT 46 UNITS SUBCUTANEOUSLY BEFORE MEALS

## 2023-08-29 NOTE — TELEPHONE ENCOUNTER
----- Message from Lorenza Schroeder MA sent at 8/28/2023  4:55 PM CDT -----  Regarding: FW: Mini [phone]    ----- Message -----  From: Steph Eduardo  Sent: 8/28/2023   3:56 PM CDT  To: [facility] Staff  Subject: Mini [phone]                                Who called: Mini calling from [employer]       What is the request in detail: mini calling to get direction on insulin lispro (HUMALOG KWIKPEN INSULIN) 100 unit/mL pen usage for pt        Can the clinic reply by [facility]? No        Would the patient rather a call back or a response via [facility]? [facility]       Best call back number:[phone]

## 2023-09-07 DIAGNOSIS — I15.2 HYPERTENSION ASSOCIATED WITH DIABETES: ICD-10-CM

## 2023-09-07 DIAGNOSIS — E78.1 HYPERTRIGLYCERIDEMIA: Chronic | ICD-10-CM

## 2023-09-07 DIAGNOSIS — E11.9 TYPE 2 DIABETES MELLITUS WITHOUT COMPLICATION, UNSPECIFIED WHETHER LONG TERM INSULIN USE: Primary | ICD-10-CM

## 2023-09-07 DIAGNOSIS — Z00.00 ANNUAL PHYSICAL EXAM: ICD-10-CM

## 2023-09-07 DIAGNOSIS — E11.59 HYPERTENSION ASSOCIATED WITH DIABETES: ICD-10-CM

## 2023-09-07 RX ORDER — HYDROCHLOROTHIAZIDE 25 MG/1
TABLET ORAL
Qty: 90 TABLET | Refills: 1 | Status: SHIPPED | OUTPATIENT
Start: 2023-09-07 | End: 2024-03-12

## 2023-10-05 ENCOUNTER — PATIENT MESSAGE (OUTPATIENT)
Dept: INTERNAL MEDICINE | Facility: CLINIC | Age: 53
End: 2023-10-05
Payer: COMMERCIAL

## 2023-10-09 RX ORDER — ACETAZOLAMIDE 500 MG/1
CAPSULE, EXTENDED RELEASE ORAL
Qty: 30 CAPSULE | Refills: 0 | Status: SHIPPED | OUTPATIENT
Start: 2023-10-09

## 2023-11-24 DIAGNOSIS — Z79.4 TYPE 2 DIABETES MELLITUS WITH DIABETIC POLYNEUROPATHY, WITH LONG-TERM CURRENT USE OF INSULIN: ICD-10-CM

## 2023-11-24 DIAGNOSIS — E11.42 TYPE 2 DIABETES MELLITUS WITH DIABETIC POLYNEUROPATHY, WITH LONG-TERM CURRENT USE OF INSULIN: ICD-10-CM

## 2023-11-27 RX ORDER — INSULIN DEGLUDEC 200 U/ML
INJECTION, SOLUTION SUBCUTANEOUS
Qty: 9 PEN | Refills: 1 | Status: SHIPPED | OUTPATIENT
Start: 2023-11-27 | End: 2024-03-25

## 2023-11-29 ENCOUNTER — OFFICE VISIT (OUTPATIENT)
Dept: INTERNAL MEDICINE | Facility: CLINIC | Age: 53
End: 2023-11-29
Payer: COMMERCIAL

## 2023-11-29 VITALS
WEIGHT: 274.81 LBS | HEIGHT: 76 IN | SYSTOLIC BLOOD PRESSURE: 142 MMHG | BODY MASS INDEX: 33.46 KG/M2 | OXYGEN SATURATION: 98 % | RESPIRATION RATE: 20 BRPM | HEART RATE: 71 BPM | TEMPERATURE: 98 F | DIASTOLIC BLOOD PRESSURE: 82 MMHG

## 2023-11-29 DIAGNOSIS — G47.30 SLEEP APNEA, UNSPECIFIED TYPE: ICD-10-CM

## 2023-11-29 DIAGNOSIS — Z23 NEED FOR VACCINATION: ICD-10-CM

## 2023-11-29 DIAGNOSIS — N52.9 ERECTILE DYSFUNCTION, UNSPECIFIED ERECTILE DYSFUNCTION TYPE: ICD-10-CM

## 2023-11-29 DIAGNOSIS — Z11.4 ENCOUNTER FOR SCREENING FOR HIV: ICD-10-CM

## 2023-11-29 DIAGNOSIS — Z11.59 ENCOUNTER FOR HEPATITIS C SCREENING TEST FOR LOW RISK PATIENT: ICD-10-CM

## 2023-11-29 DIAGNOSIS — I10 HYPERTENSION, UNSPECIFIED TYPE: Primary | ICD-10-CM

## 2023-11-29 DIAGNOSIS — Z12.11 ENCOUNTER FOR SCREENING COLONOSCOPY: ICD-10-CM

## 2023-11-29 PROCEDURE — 99396 PR PREVENTIVE VISIT,EST,40-64: ICD-10-PCS | Mod: 25,S$GLB,, | Performed by: NURSE PRACTITIONER

## 2023-11-29 PROCEDURE — 90471 IMMUNIZATION ADMIN: CPT | Mod: S$GLB,,, | Performed by: NURSE PRACTITIONER

## 2023-11-29 PROCEDURE — 90686 IIV4 VACC NO PRSV 0.5 ML IM: CPT | Mod: S$GLB,,, | Performed by: NURSE PRACTITIONER

## 2023-11-29 PROCEDURE — 90471 FLU VACCINE (QUAD) GREATER THAN OR EQUAL TO 3YO PRESERVATIVE FREE IM: ICD-10-PCS | Mod: S$GLB,,, | Performed by: NURSE PRACTITIONER

## 2023-11-29 PROCEDURE — 99999 PR PBB SHADOW E&M-EST. PATIENT-LVL V: ICD-10-PCS | Mod: PBBFAC,,, | Performed by: NURSE PRACTITIONER

## 2023-11-29 PROCEDURE — 99396 PREV VISIT EST AGE 40-64: CPT | Mod: 25,S$GLB,, | Performed by: NURSE PRACTITIONER

## 2023-11-29 PROCEDURE — 99999 PR PBB SHADOW E&M-EST. PATIENT-LVL V: CPT | Mod: PBBFAC,,, | Performed by: NURSE PRACTITIONER

## 2023-11-29 PROCEDURE — 90686 FLU VACCINE (QUAD) GREATER THAN OR EQUAL TO 3YO PRESERVATIVE FREE IM: ICD-10-PCS | Mod: S$GLB,,, | Performed by: NURSE PRACTITIONER

## 2023-11-29 NOTE — ASSESSMENT & PLAN NOTE
BP elevated in office. It may be due to him just getting off at work.   He also states that he has only been taking hydralazine BID instead of TID.   He is encouraged to monitor at home and if, when rested his SBP is consistently >140 he is encouraged to resume taking his hydralazine TID.   Pt denies med refills at this time.

## 2023-11-29 NOTE — ASSESSMENT & PLAN NOTE
Chronic issue. Was on Viagra in the past which worked well.   Will check his testosterone and if normal will restart Viagra.   Discussed the severity if priapism with use and need for ER evaluation if this occurs.

## 2023-11-29 NOTE — PROGRESS NOTES
Subjective:       Patient ID: Lopez Hicks is a 50 y.o. male.     Chief Complaint: Annual Exam     HPI   50 y.o. Male here for annual exam.   Hx of HTN. Pt currently taking amlodipine 10 mg and HCTZ 25 mg QD and hydralazine 100 mg BID. Should be taking TID but misses the mid day dose. He does not monitor BP at home on a consistent basis but when he does it is usually in the 120s-130s. He denies any associated cardiac symptoms.     Hx of DM currently managed with endocrinology. Recent HgA1c was 7%.     Hx of BEVERLY currently managed with CPAP which he is tolerating well.     He would like to discuss getting refills of Viagra which he has taken in the past. Pt reports difficulty with getting and maintaining an erection.      Vaccines: Influenza- 11/29/23  Tetanus Due 1/25/2028;   Eye exam: Sept 2023  Colonoscopy: Provided info to schedule   Exercise: cuts grass  Diet: regular     Past Medical History:  No date: Diabetes mellitus, type 2  No date: GERD (gastroesophageal reflux disease)  No date: Hyperlipidemia  No date: Hyperthyroidism  Past Surgical History:  No date: CHOLECYSTECTOMY  No date: LUNG LOBECTOMY; Right      Comment:  after severe pneumonia  No date: ROTATOR CUFF REPAIR; Bilateral      Comment:  Dr. JERRY Cardona, and Dr. Jung.  Social History    Socioeconomic History      Marital status:       Spouse name: Not on file      Number of children: 3      Years of education: Not on file      Highest education level: Not on file    Occupational History      Occupation:     Social Needs      Financial resource strain: Not on file      Food insecurity        Worry: Not on file        Inability: Not on file      Transportation needs        Medical: Not on file        Non-medical: Not on file    Tobacco Use      Smoking status: Never Smoker      Smokeless tobacco: Never Used    Substance and Sexual Activity      Alcohol use: Yes        Alcohol/week: 0.0 standard drinks        Comment: beer once  every 3 months      Drug use: No      Sexual activity: Yes        Partners: Female    Lifestyle      Physical activity        Days per week: Not on file        Minutes per session: Not on file      Stress: Not on file    Relationships      Social connections        Talks on phone: Not on file        Gets together: Not on file        Attends [employer] service: Not on file        Active member of club or organization: Not on file        Attends meetings of clubs or organizations: Not on file        Relationship status: Not on file    Other Topics      Concerns:        Not on file    Social History Narrative       and lives with wife and twin daughters.  Works at [employer].  Enjoys hunting and fishing.  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      Review of patient's allergies indicates:   -- Lisinopril     --  Muscle aches and joint stiffness  Jose Hicks had no medications administered during this visit.     Review of Systems   Constitutional: Negative for activity change, appetite change, chills, diaphoresis, fatigue, fever and unexpected weight change.  HENT: Negative for nasal congestion, mouth sores, postnasal drip, rhinorrhea, sinus pressure/congestion, sneezing, sore throat, trouble swallowing and voice change.    Eyes: Negative for discharge, itching and visual disturbance.  Respiratory: Negative for cough, chest tightness, shortness of breath and wheezing.    Cardiovascular: Negative for chest pain, palpitations and leg swelling.  Gastrointestinal: Negative for abdominal pain, blood in stool, constipation, diarrhea, nausea and vomiting.  Endocrine: Negative for cold intolerance and heat intolerance.  Genitourinary: Negative for difficulty urinating, dysuria, flank pain, hematuria and urgency.  Musculoskeletal: Negative for arthralgias, back pain, myalgias and neck pain.  Integumentary:  Negative for rash and wound.  Allergic/Immunologic: Negative for environmental allergies  and food allergies.  Neurological: Negative for dizziness, tremors, seizures, syncope, weakness and headaches.  Hematological: Negative for adenopathy. Does not bruise/bleed easily.  Psychiatric/Behavioral: Negative for confusion, self-injury, sleep disturbance and suicidal ideas.         Objective:   Physical Exam  Constitutional:       General: He is not in acute distress.     Appearance: He is well-developed. He is not diaphoretic.   HENT:      Head: Normocephalic and atraumatic.      Right Ear: External ear normal.      Left Ear: External ear normal.      Nose: Nose normal.      Mouth/Throat:      Pharynx: No oropharyngeal exudate.   Eyes:      General: No scleral icterus.        Right eye: No discharge.         Left eye: No discharge.      Conjunctiva/sclera: Conjunctivae normal.      Pupils: Pupils are equal, round, and reactive to light.   Neck:      Musculoskeletal: Normal range of motion and neck supple.      Thyroid: No thyromegaly.      Vascular: No JVD.   Cardiovascular:      Rate and Rhythm: Normal rate and regular rhythm.      Pulses:           Dorsalis pedis pulses are 2+ on the right side and 2+ on the left side.        Posterior tibial pulses are 2+ on the right side and 2+ on the left side.      Heart sounds: Normal heart sounds. No murmur.   Pulmonary:      Effort: Pulmonary effort is normal. No respiratory distress.      Breath sounds: Normal breath sounds. No wheezing or rales.   Abdominal:      General: Bowel sounds are normal. There is no distension.      Palpations: Abdomen is soft.      Tenderness: There is no abdominal tenderness. There is no guarding.   Lymphadenopathy:      Cervical: No cervical adenopathy.   Skin:     General: Skin is warm and dry.      Coloration: Skin is not pale.      Findings: No rash.   Neurological:      Mental Status: He is alert and oriented to person, place, and time.   Psychiatric:         Judgment: Judgment normal.          Assessment:       1. Annual physical  exam    2 Hypertension associated with diabetes   3.  DM2   4.  Erectile dysfunction   5. Encounter for HIV screening   6. Encounter for HepC screening.   7.  Sleep apnea   8.  Colon cancer screening  Plan:    1. Blood work ordered     2. HTN-  BP elevated in office. It may be due to him just getting off at work.   He also states that he has only been taking hydralazine BID instead of TID.   He is encouraged to monitor at home and if, when rested his SBP is consistently >140 he is encouraged to resume taking his hydralazine TID.   Pt denies med refills at this time.      3. DM2-   Continue to follow up with endocrinology for ongoing care.      4. Erectile dysfunction-  Chronic issue. Was on Viagra in the past which worked well.   Will check his testosterone and if normal will restart Viagra.   Discussed the severity if priapism with use and need for ER evaluation if this occurs.      5. Lab ordered     6. Lab ordered      7.BEVERLY-  Stable.   Continue with current CPAP use.      8. Colon cancer screening.   Provided number to call and scheduled colonoscopy.       F/u with PCP annually or PRN.

## 2023-12-01 ENCOUNTER — PATIENT MESSAGE (OUTPATIENT)
Dept: INTERNAL MEDICINE | Facility: CLINIC | Age: 53
End: 2023-12-01
Payer: COMMERCIAL

## 2023-12-01 DIAGNOSIS — N52.9 ERECTILE DYSFUNCTION, UNSPECIFIED ERECTILE DYSFUNCTION TYPE: Primary | ICD-10-CM

## 2023-12-01 RX ORDER — SILDENAFIL 100 MG/1
100 TABLET, FILM COATED ORAL DAILY PRN
Qty: 18 TABLET | Refills: 0 | Status: SHIPPED | OUTPATIENT
Start: 2023-12-01

## 2023-12-05 ENCOUNTER — PATIENT MESSAGE (OUTPATIENT)
Dept: ENDOCRINOLOGY | Facility: CLINIC | Age: 53
End: 2023-12-05
Payer: COMMERCIAL

## 2023-12-05 DIAGNOSIS — Z79.4 TYPE 2 DIABETES MELLITUS WITH DIABETIC POLYNEUROPATHY, WITH LONG-TERM CURRENT USE OF INSULIN: Primary | ICD-10-CM

## 2023-12-05 DIAGNOSIS — E11.42 TYPE 2 DIABETES MELLITUS WITH DIABETIC POLYNEUROPATHY, WITH LONG-TERM CURRENT USE OF INSULIN: Primary | ICD-10-CM

## 2023-12-07 NOTE — TELEPHONE ENCOUNTER
Care Due:                  Date            Visit Type   Department     Provider  --------------------------------------------------------------------------------    Last Visit: None Found      None         None Found  Next Visit: None Scheduled  None         None Found                                                            Last  Test          Frequency    Reason                     Performed    Due Date  --------------------------------------------------------------------------------    Office Visit  15 months..  buPROPion, fenofibrate,    Not Found    Overdue                             hydrALAZINE,                             hydroCHLOROthiazide,                             losartan, omeprazole,                             pravastatin..............    CBC.........  12 months..  fenofibrate, hydrALAZINE.  11- 11-    CMP.........  12 months..  fenofibrate,               11- 11-                             hydroCHLOROthiazide,                             losartan, pravastatin....    [employer] Embedded Care Due Messages. Reference number: [id number].   12/07/2023 1:07:15 PM CST

## 2023-12-08 RX ORDER — AMLODIPINE BESYLATE 10 MG/1
TABLET ORAL
Qty: 90 TABLET | Refills: 1 | Status: SHIPPED | OUTPATIENT
Start: 2023-12-08 | End: 2024-03-12

## 2023-12-08 NOTE — TELEPHONE ENCOUNTER
Refill Routing Note   Medication(s) are not appropriate for processing by [facility] for the following reason(s):        Patient not seen by provider within 15 months  Required vitals abnormal    [facility] action(s):  Defer   Requires appointment : Yes     Requires labs : Yes             Appointments  past 12m or future 3m with PCP    Date Provider   Last Visit   7/12/2021 Sukumar Mackey, DO   Next Visit   Visit date not found Sukumar Mackey, DO   ED visits in past 90 days: 0        Note composed:3:15 PM 12/08/2023

## 2023-12-22 ENCOUNTER — OFFICE VISIT (OUTPATIENT)
Dept: URGENT CARE | Facility: CLINIC | Age: 53
End: 2023-12-22
Payer: COMMERCIAL

## 2023-12-22 VITALS
BODY MASS INDEX: 33.36 KG/M2 | DIASTOLIC BLOOD PRESSURE: 81 MMHG | OXYGEN SATURATION: 98 % | SYSTOLIC BLOOD PRESSURE: 134 MMHG | HEART RATE: 77 BPM | WEIGHT: 274 LBS | HEIGHT: 76 IN | TEMPERATURE: 98 F | RESPIRATION RATE: 18 BRPM

## 2023-12-22 DIAGNOSIS — Z79.4 TYPE 2 DIABETES MELLITUS WITH DIABETIC POLYNEUROPATHY, WITH LONG-TERM CURRENT USE OF INSULIN: ICD-10-CM

## 2023-12-22 DIAGNOSIS — E78.5 HYPERLIPIDEMIA, UNSPECIFIED HYPERLIPIDEMIA TYPE: ICD-10-CM

## 2023-12-22 DIAGNOSIS — E11.42 TYPE 2 DIABETES MELLITUS WITH DIABETIC POLYNEUROPATHY, WITH LONG-TERM CURRENT USE OF INSULIN: ICD-10-CM

## 2023-12-22 DIAGNOSIS — K21.9 GASTROESOPHAGEAL REFLUX DISEASE, UNSPECIFIED WHETHER ESOPHAGITIS PRESENT: ICD-10-CM

## 2023-12-22 DIAGNOSIS — I10 HYPERTENSION, UNSPECIFIED TYPE: Primary | ICD-10-CM

## 2023-12-22 PROCEDURE — 99214 PR OFFICE/OUTPT VISIT, EST, LEVL IV, 30-39 MIN: ICD-10-PCS | Mod: 25,S$GLB,, | Performed by: PHYSICIAN ASSISTANT

## 2023-12-22 PROCEDURE — 96372 PR INJECTION,THERAP/PROPH/DIAG2ST, IM OR SUBCUT: ICD-10-PCS | Mod: S$GLB,,, | Performed by: PHYSICIAN ASSISTANT

## 2023-12-22 PROCEDURE — 99214 OFFICE O/P EST MOD 30 MIN: CPT | Mod: 25,S$GLB,, | Performed by: PHYSICIAN ASSISTANT

## 2023-12-22 PROCEDURE — 96372 THER/PROPH/DIAG INJ SC/IM: CPT | Mod: S$GLB,,, | Performed by: PHYSICIAN ASSISTANT

## 2023-12-22 RX ORDER — KETOROLAC TROMETHAMINE 30 MG/ML
30 INJECTION, SOLUTION INTRAMUSCULAR; INTRAVENOUS
Status: COMPLETED | OUTPATIENT
Start: 2023-12-22 | End: 2023-12-22

## 2023-12-22 RX ORDER — HYDROCODONE BITARTRATE AND ACETAMINOPHEN 5; 325 MG/1; MG/1
1 TABLET ORAL EVERY 6 HOURS PRN
Qty: 10 TABLET | Refills: 0 | Status: SHIPPED | OUTPATIENT
Start: 2023-12-22

## 2023-12-22 RX ORDER — CYCLOBENZAPRINE HCL 5 MG
5 TABLET ORAL 3 TIMES DAILY PRN
Qty: 21 TABLET | Refills: 0 | Status: SHIPPED | OUTPATIENT
Start: 2023-12-22 | End: 2024-01-01

## 2023-12-22 RX ADMIN — KETOROLAC TROMETHAMINE 30 MG: 30 INJECTION, SOLUTION INTRAMUSCULAR; INTRAVENOUS at 12:12

## 2023-12-22 NOTE — PATIENT INSTRUCTIONS
Do not take the prescribed narcotic medication while driving or operating machinery.   This medication can be addictive-take with caution. Only use for break trhough pain.   Do not take Tylenol with this medication, as it contains Tylenol.  You may fill a lesser quantity of this narcotic that prescribed if desired. Let your pharmacist know and you can have a lesser amount filled at pickup.     If not allergic,take tylenol (acetominophen) for fever control, chills, or body aches every 4 hours. Do not exceed 4000 mg/ day.If not allergic, take Motrin (Ibuprofen) every 4 hours for fever, chills, pain or inflammation. Do not exceed 2400 mg/day. You can alternate taking tylenol and motrin.     You must understand that you've received an Urgent Care treatment only and that you may be released before all your medical problems are known or treated. You, the patient, will arrange for follow up care as instructed.      Follow up with your PCP or specialty clinic as instructed in the next 2-3 days if not improved or as needed. You can call [phone] to schedule an appointment with appropriate provider.      If you condition worsens, we recommend that you receive another evaluation at the emergency room immediately or contact your primary medical clinic's after hours call service to discuss your concerns.      Please return here or go to the Emergency Department for any concerns or worsening condition.      If you were prescribed a narcotic or controlled substance, do not drive or operate heavy equipment or machinery while taking these medications.

## 2023-12-22 NOTE — PROGRESS NOTES
"Subjective:      Patient ID: Lopez Hicks is a 53 y.o. male.    Vitals:  height is 6' 4" (1.93 m) and weight is 124.3 kg (274 lb). His oral temperature is 97.7 °F (36.5 °C). His blood pressure is 134/81 and his pulse is 77. His respiration is 18 and oxygen saturation is 98%.     Chief Complaint: Pain    Pt complains of right leg pain. Pt states there was no injury and thinks its sciatic pain. Pain started last night     Patient provider note starts here:  Patient presents to the clinic with complaints of right-sided leg pain which started yesterday evening.  Reports a sharp, stabbing like pain radiating from the right buttock into the right ankle.  Reports that he is unable to sleep last night due to the pain.  He feels like this may be related to sciatica but is unsure.  Denies any recent falls, traumas or injuries.  He took some Tylenol for his pain which did not give him much relief.    Injury  This is a new problem. The current episode started yesterday. The problem occurs constantly. The problem has been unchanged. Pertinent negatives include no abdominal pain, anorexia, arthralgias, change in bowel habit, chest pain, chills, congestion, coughing, diaphoresis, fatigue, fever, headaches, joint swelling, myalgias, nausea, neck pain, numbness, rash, sore throat, swollen glands, urinary symptoms, vertigo, visual change, vomiting or weakness. The symptoms are aggravated by walking. He has tried nothing for the symptoms. The treatment provided no relief.       Constitution: Negative for chills, sweating, fatigue and fever.   HENT:  Negative for congestion and sore throat.    Neck: Negative for neck pain and neck stiffness.   Cardiovascular:  Negative for chest pain.   Respiratory:  Negative for chest tightness, cough and wheezing.    Gastrointestinal:  Negative for abdominal pain, nausea, vomiting and diarrhea.   Genitourinary:  Negative for dysuria, frequency and urgency.   Musculoskeletal:  Negative for " pain, joint pain, joint swelling and muscle ache.   Skin:  Negative for rash, wound and erythema.   Allergic/Immunologic: Negative for itching.   Neurological:  Negative for history of vertigo, headaches, numbness and tingling.      Objective:     Physical Exam   Constitutional: He is oriented to person, place, and time. He appears well-developed. He is cooperative.  Non-toxic appearance. He does not appear ill. No distress.   HENT:   Head: Normocephalic and atraumatic.   Nose: Nose normal.   Mouth/Throat: Oropharynx is clear and moist and mucous membranes are normal.   Eyes: Conjunctivae and lids are normal.   Neck: Trachea normal and phonation normal. Neck supple.   Cardiovascular: Normal rate and normal pulses.   Pulmonary/Chest: Effort normal.   Abdominal: Normal appearance. Soft. There is no abdominal tenderness.   Musculoskeletal: Normal range of motion.         General: No swelling, tenderness or deformity. Normal range of motion.   Neurological: He is alert and oriented to person, place, and time. He has normal strength and normal reflexes. He displays normal reflexes. No sensory deficit.   Skin: Skin is warm, dry, intact and not diaphoretic. Capillary refill takes less than 2 seconds. No bruising and No erythema   Psychiatric: His speech is normal and behavior is normal. Judgment and thought content normal.   Nursing note and vitals reviewed.      Assessment:     1. Hypertension, unspecified type    2. Hyperlipidemia, unspecified hyperlipidemia type    3. Type 2 diabetes mellitus with diabetic polyneuropathy, with long-term current use of insulin    4. Gastroesophageal reflux disease, unspecified whether esophagitis present        Plan:       Hypertension, unspecified type    Hyperlipidemia, unspecified hyperlipidemia type    Type 2 diabetes mellitus with diabetic polyneuropathy, with long-term current use of insulin    Gastroesophageal reflux disease, unspecified whether esophagitis present    Other  orders  -     ketorolac injection 30 mg  -     HYDROcodone-acetaminophen (NORCO) 5-325 mg per tablet; Take 1 tablet by mouth every 6 (six) hours as needed for Pain.  Dispense: 10 tablet; Refill: 0  -     cyclobenzaprine (FLEXERIL) 5 MG tablet; Take 1 tablet (5 mg total) by mouth 3 (three) times daily as needed for Muscle spasms.  Dispense: 21 tablet; Refill: 0          Medical Decision Making:   History:   Old Medical Records: I decided to obtain old medical records.  Urgent Care Management:  A. Problem List:   -Acute: Right sided sciatica   -Chronic: DMII, HTN, HLD, GERD, BEVERLY  B. Differential diagnosis: fracture, dislocation, soft tissue injury, sciatica   C. Diagnostic Testing Ordered: None  D. Diagnostic Testing Considered: None  E. Independent Historians:  F. Urgent Care Midlevel Independent Results Interpretation:   G. Radiology:  H. Review of Previous Medical Records:  I. Home Medications Reviewed  J. Social Determinants of Health considered  K. Medical Decision Making and Disposition:  Patient presents to the clinic with complaints of right-sided leg pain which starts in the buttock and goes in the ankle.  On exam, he is afebrile and nontoxic in appearance.  Strong equal distal pulses.  Good strength.  Neurovascularly intact.  No evidence of cauda equina.  No calf tenderness with palpation.  I do not suspect DVT at this time.  He was administered Toradol in clinic and prescribed Flexeril with a short course of narcotic pain medication.  The risks and addictive nature of opioids was discussed with the patient.  ED precautions discussed.  He verbalized understanding and agreed with this plan.         Patient Instructions   Do not take the prescribed narcotic medication while driving or operating machinery.   This medication can be addictive-take with caution. Only use for break trhough pain.   Do not take Tylenol with this medication, as it contains Tylenol.  You may fill a lesser quantity of this narcotic that  prescribed if desired. Let your pharmacist know and you can have a lesser amount filled at pickup.     If not allergic,take tylenol (acetominophen) for fever control, chills, or body aches every 4 hours. Do not exceed 4000 mg/ day.If not allergic, take Motrin (Ibuprofen) every 4 hours for fever, chills, pain or inflammation. Do not exceed 2400 mg/day. You can alternate taking tylenol and motrin.     You must understand that you've received an Urgent Care treatment only and that you may be released before all your medical problems are known or treated. You, the patient, will arrange for follow up care as instructed.      Follow up with your PCP or specialty clinic as instructed in the next 2-3 days if not improved or as needed. You can call [phone] to schedule an appointment with appropriate provider.      If you condition worsens, we recommend that you receive another evaluation at the emergency room immediately or contact your primary medical clinic's after hours call service to discuss your concerns.      Please return here or go to the Emergency Department for any concerns or worsening condition.      If you were prescribed a narcotic or controlled substance, do not drive or operate heavy equipment or machinery while taking these medications.

## 2023-12-29 DIAGNOSIS — F41.9 ANXIETY: ICD-10-CM

## 2023-12-29 DIAGNOSIS — E11.69 HYPERLIPIDEMIA ASSOCIATED WITH TYPE 2 DIABETES MELLITUS: ICD-10-CM

## 2023-12-29 DIAGNOSIS — E78.1 HYPERTRIGLYCERIDEMIA: ICD-10-CM

## 2023-12-29 DIAGNOSIS — E78.5 HYPERLIPIDEMIA ASSOCIATED WITH TYPE 2 DIABETES MELLITUS: ICD-10-CM

## 2023-12-29 NOTE — TELEPHONE ENCOUNTER
No care due was identified.  [facility] Embedded Care Due Messages. Reference number: [id number].   12/29/2023 2:24:57 PM CST

## 2023-12-31 NOTE — TELEPHONE ENCOUNTER
Refill Routing Note   Medication(s) are not appropriate for processing by [facility] for the following reason(s):        Patient not seen by provider within 15 months  Required labs outdated    [facility] action(s):  Defer               Appointments  past 12m or future 3m with PCP    Date Provider   Last Visit   7/12/2021 Sukumar Mackey, DO   Next Visit   Visit date not found Sukumar Mackey, DO   ED visits in past 90 days: 0        Note composed:6:06 PM 12/30/2023

## 2024-01-01 RX ORDER — BUPROPION HYDROCHLORIDE 200 MG/1
TABLET, EXTENDED RELEASE ORAL
Qty: 180 TABLET | Refills: 3 | Status: SHIPPED | OUTPATIENT
Start: 2024-01-01

## 2024-01-01 RX ORDER — PRAVASTATIN SODIUM 40 MG/1
TABLET ORAL
Qty: 90 TABLET | Refills: 3 | Status: SHIPPED | OUTPATIENT
Start: 2024-01-01

## 2024-01-01 RX ORDER — OMEPRAZOLE 40 MG/1
CAPSULE, DELAYED RELEASE ORAL
Qty: 90 CAPSULE | Refills: 3 | Status: SHIPPED | OUTPATIENT
Start: 2024-01-01

## 2024-01-23 ENCOUNTER — PATIENT MESSAGE (OUTPATIENT)
Dept: ORTHOPEDICS | Facility: CLINIC | Age: 54
End: 2024-01-23
Payer: COMMERCIAL

## 2024-01-23 DIAGNOSIS — M25.561 ACUTE PAIN OF RIGHT KNEE: ICD-10-CM

## 2024-01-23 DIAGNOSIS — M17.11 PATELLOFEMORAL ARTHRITIS OF RIGHT KNEE: Primary | ICD-10-CM

## 2024-02-08 ENCOUNTER — OFFICE VISIT (OUTPATIENT)
Dept: ORTHOPEDICS | Facility: CLINIC | Age: 54
End: 2024-02-08
Payer: COMMERCIAL

## 2024-02-08 ENCOUNTER — HOSPITAL ENCOUNTER (OUTPATIENT)
Dept: RADIOLOGY | Facility: HOSPITAL | Age: 54
Discharge: HOME | End: 2024-02-08
Attending: ORTHOPAEDIC SURGERY
Payer: COMMERCIAL

## 2024-02-08 VITALS — BODY MASS INDEX: 33.49 KG/M2 | WEIGHT: 275 LBS | HEIGHT: 76 IN

## 2024-02-08 DIAGNOSIS — M17.11 PRIMARY OSTEOARTHRITIS OF RIGHT KNEE: Primary | ICD-10-CM

## 2024-02-08 DIAGNOSIS — M17.11 PATELLOFEMORAL ARTHRITIS OF RIGHT KNEE: ICD-10-CM

## 2024-02-08 DIAGNOSIS — M25.561 ACUTE PAIN OF RIGHT KNEE: ICD-10-CM

## 2024-02-08 PROCEDURE — 73564 X-RAY EXAM KNEE 4 OR MORE: CPT | Mod: 26,RT,, | Performed by: RADIOLOGY

## 2024-02-08 PROCEDURE — 99215 OFFICE O/P EST HI 40 MIN: CPT | Mod: S$GLB,,, | Performed by: ORTHOPAEDIC SURGERY

## 2024-02-08 PROCEDURE — 73562 X-RAY EXAM OF KNEE 3: CPT | Mod: TC,59,LT

## 2024-02-08 PROCEDURE — 99999 PR PBB SHADOW E&M-EST. PATIENT-LVL IV: CPT | Mod: PBBFAC,,, | Performed by: ORTHOPAEDIC SURGERY

## 2024-02-08 PROCEDURE — 73562 X-RAY EXAM OF KNEE 3: CPT | Mod: 26,59,LT, | Performed by: RADIOLOGY

## 2024-02-08 NOTE — PROGRESS NOTES
Subjective:     HPI:   Lopez Hicks is a 53 y.o. male who presents for repeat eval R knee    8/8/23: PF OA and IT band, better after CSI with dr castillo, f/u PRN    Persistent symptoms through the past few months, intermittent, lots of swelling,   No specific injuries  Much worse a month ago  Stairs and kneeling much worse    Went to urgent care 12/22/23: Patient presents to the clinic with complaints of right-sided leg pain which started yesterday evening.  Reports a sharp, stabbing like pain radiating from the right buttock into the right ankle.   Says whole leg was hurting and knee swole up too  Rx'd IM toradool, norco, flexeril  Pain going down leg better now    DM better    See note 8/8/23     Objective:   Body mass index is 33.48 kg/m².  Exam:  Slight limp and antalgic gait favoring the right knee negative Trendelenburg.  Right knee 0-120 degree knee range of motion 5° valgus alignment he is most tender at the lateral joint line and also tender around the patellofemoral joint he has a large effusion today knee is stable anterior-posterior varus and valgus stresses without flexion contracture or extensor lag.  No groin pain with active straight leg raise no pain with active/passive range of motion of his hip joint      Imaging:  Indication:  Right knee pain  Exam Ordered: Radiographs of the right knee include a standing anteroposterior view, a standing posterioanterior view, a lateral view in full flexion, and a sunrise view  Details of Examination: Exam shows evidence of joint space narrowing, osteophyte formation, and subchondral sclerosis, all consistent with degenerative arthritis of the knee.  No other significant findings are noted.  Impression:  Degenerative Arthritis, Right Knee    Klg4 R knee OA, severe bone on bone PF joint, progressive medial and lateral compartment degenerative changes      Assessment:       ICD-10-CM ICD-9-CM   1. Primary osteoarthritis of right knee  M17.11 715.16       DM 7.0 (down from 8.8)  R lung lower lobectomy after PNA  BEVERLY+ CPAP     Hx of L knee PCL tear     Plan:       The above findings were discussed with patient length. We discussed the risks of conservative versus surgical management knee arthritis. Conservative management consisting of anti-inflammatory medications, glucosamine/chondroitin sulfate, weight loss, physical therapy, activity modification, as well as injections (lubricant versus corticosteroid) was discussed at length. At this point considering the patient's level of activity, pain, and radiographic findings I recommend TKA  LJL pain  and progressive med and lat comp XR changes, poor PFJ candidate, rec TKA    This patient has significant symptoms in their knee that are affecting their quality of life and daily activities.  They have tried non-operative treatment including analgesics, an exercise program, and activity modification, but the symptoms have persisted. I believe they make a good candidate for knee arthroplasty.     The risks and benefits of knee arthroplasty have been discussed with the patient which include, but are not limited to infections (including severe sequelae), potential component failure, fracture, DVT, pulmonary embolus, nerve palsy, poor range of motion, the possibility of bone grafting, persistent pain, wound healing complications, transfusions, medical complications and death.     We discussed surgical options including implant type and why I believe the implant selected is a good match for the patient's needs. The patient expressed understanding and agrees to proceed with the planned surgery.     Pre-operative planning will include the following:  A pre-surgical evaluation by will be arranged.  Pre-op orders will be placed.  We will make arrangements with the operating room for proper time and staffing.  We will make Social Service arrangements for the patient.    Implants:   Company: [employer]  System: [email or web address] PF    DVT  prophylaxis: ASA 81mg BID x1 month    Dispo: outpatient PT    Admission status:  same day discharge     Location: [facility] TKA   TKA and velys info    [place] trip   F/u mid/late april      No orders of the defined types were placed in this encounter.            Past Medical History:   Diagnosis Date    Diabetes mellitus, type 2     GERD (gastroesophageal reflux disease)     Hyperlipidemia     Hyperthyroidism     Mild nonproliferative diabetic retinopathy        Past Surgical History:   Procedure Laterality Date    CHOLECYSTECTOMY      LUNG LOBECTOMY Right     after severe pneumonia    NASAL/SINUS ENDOSCOPY      ROTATOR CUFF REPAIR Bilateral     Dr. JERRY Cardona, and Dr. Jung.       Family History   Problem Relation Age of Onset    Hypertension Mother     Diabetes Father     Hyperlipidemia Father     Hypertension Father     Heart disease Father     Prostate cancer Father 60        radioactive seed    Diabetes Paternal Grandmother        Social History     Socioeconomic History    Marital status:     Number of children: 3   Occupational History    Occupation:    Tobacco Use    Smoking status: Never    Smokeless tobacco: Never   Substance and Sexual Activity    Alcohol use: Yes     Alcohol/week: 0.0 standard drinks of alcohol     Comment: beer once every 3 months    Drug use: No    Sexual activity: Yes     Partners: Female   Social History Narrative     and lives with wife and twin daughters.  Works at [employer].  Enjoys hunting and fishing.  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

## 2024-02-11 DIAGNOSIS — Z79.4 TYPE 2 DIABETES MELLITUS WITH DIABETIC POLYNEUROPATHY, WITH LONG-TERM CURRENT USE OF INSULIN: ICD-10-CM

## 2024-02-11 DIAGNOSIS — E11.42 TYPE 2 DIABETES MELLITUS WITH DIABETIC POLYNEUROPATHY, WITH LONG-TERM CURRENT USE OF INSULIN: ICD-10-CM

## 2024-02-12 DIAGNOSIS — M17.11 PRIMARY OSTEOARTHRITIS OF RIGHT KNEE: Primary | ICD-10-CM

## 2024-02-12 RX ORDER — INSULIN LISPRO 100 [IU]/ML
20 INJECTION, SOLUTION INTRAVENOUS; SUBCUTANEOUS
Qty: 5 EACH | Refills: 0 | Status: SHIPPED | OUTPATIENT
Start: 2024-02-12 | End: 2024-03-21

## 2024-02-14 ENCOUNTER — PATIENT MESSAGE (OUTPATIENT)
Dept: ORTHOPEDICS | Facility: CLINIC | Age: 54
End: 2024-02-14
Payer: COMMERCIAL

## 2024-02-14 RX ORDER — FENOFIBRATE 145 MG/1
TABLET, FILM COATED ORAL
Qty: 90 TABLET | Refills: 3 | Status: SHIPPED | OUTPATIENT
Start: 2024-02-14

## 2024-02-14 NOTE — TELEPHONE ENCOUNTER
----- Message from Jolly Dailey MD sent at 6/2/2020  1:50 PM EDT -----  Please let her know that we finally were able to get all of her labs back.  She has a low viral load of hepatitis C at this time.  This can fluctuate however.  All of her liver tests are within normal limits so it appears that her hepatitis C is not affecting her liver too much at this time.    We will plan to follow-up with her after she delivers, she currently has an October appointment in we will keep that is follow-up at this time.   Care Due:                  Date            Visit Type   Department     Provider  --------------------------------------------------------------------------------    Last Visit: None Found      None         None Found  Next Visit: None Scheduled  None         None Found                                                            Last  Test          Frequency    Reason                     Performed    Due Date  --------------------------------------------------------------------------------    Office Visit  15 months..  buPROPion, fenofibrate,    Not Found    Overdue                             hydrALAZINE,                             hydroCHLOROthiazide,                             losartan, omeprazole,                             pravastatin..............    CBC.........  12 months..  fenofibrate, hydrALAZINE.  11- 11-    CMP.........  12 months..  fenofibrate,               11- 11-                             hydroCHLOROthiazide,                             losartan, pravastatin....    [employer] Embedded Care Due Messages. Reference number: [id number].   2/14/2024 11:33:58 AM CST

## 2024-02-19 DIAGNOSIS — E11.42 TYPE 2 DIABETES MELLITUS WITH DIABETIC POLYNEUROPATHY, WITH LONG-TERM CURRENT USE OF INSULIN: ICD-10-CM

## 2024-02-19 DIAGNOSIS — Z79.4 TYPE 2 DIABETES MELLITUS WITH DIABETIC POLYNEUROPATHY, WITH LONG-TERM CURRENT USE OF INSULIN: ICD-10-CM

## 2024-02-19 RX ORDER — TIRZEPATIDE 7.5 MG/.5ML
INJECTION, SOLUTION SUBCUTANEOUS
Qty: 4 PEN | Refills: 1 | Status: SHIPPED | OUTPATIENT
Start: 2024-02-19 | End: 2024-02-22

## 2024-02-24 ENCOUNTER — PATIENT MESSAGE (OUTPATIENT)
Dept: ADMINISTRATIVE | Facility: OTHER | Age: 54
End: 2024-02-24
Payer: COMMERCIAL

## 2024-02-26 ENCOUNTER — PATIENT MESSAGE (OUTPATIENT)
Dept: ADMINISTRATIVE | Facility: OTHER | Age: 54
End: 2024-02-26
Payer: COMMERCIAL

## 2024-02-28 ENCOUNTER — PATIENT OUTREACH (OUTPATIENT)
Dept: ADMINISTRATIVE | Facility: HOSPITAL | Age: 54
End: 2024-02-28
Payer: COMMERCIAL

## 2024-02-28 NOTE — PROGRESS NOTES

## 2024-03-04 ENCOUNTER — PATIENT MESSAGE (OUTPATIENT)
Dept: ORTHOPEDICS | Facility: CLINIC | Age: 54
End: 2024-03-04
Payer: COMMERCIAL

## 2024-03-05 ENCOUNTER — TELEPHONE (OUTPATIENT)
Dept: ORTHOPEDICS | Facility: CLINIC | Age: 54
End: 2024-03-05
Payer: COMMERCIAL

## 2024-03-05 DIAGNOSIS — M17.11 PRIMARY OSTEOARTHRITIS OF RIGHT KNEE: Primary | ICD-10-CM

## 2024-03-05 NOTE — TELEPHONE ENCOUNTER
Spoke with susi reviewer  Susi requires PT once a week for 6 weeks before they will approve his TKA even for KL grade 4 arthritis and even though he had failed physician directed HEP in the past year    They were under the impression that we were requesting custom implants but I clarified that we were not and he said he would approve the VELYS TKA

## 2024-03-05 NOTE — TELEPHONE ENCOUNTER
I called the patient today regarding his surgery. The patient is aware that his insurance is require PT once a week for 6 weeks before they authorize surgery. Patient is going to get scheduled for PT so we can figure out when his surgery can happen . The patient verbalized understanding and has no further questions.

## 2024-03-06 ENCOUNTER — PATIENT MESSAGE (OUTPATIENT)
Dept: ORTHOPEDICS | Facility: CLINIC | Age: 54
End: 2024-03-06
Payer: COMMERCIAL

## 2024-03-08 ENCOUNTER — PATIENT MESSAGE (OUTPATIENT)
Dept: ADMINISTRATIVE | Facility: OTHER | Age: 54
End: 2024-03-08
Payer: COMMERCIAL

## 2024-03-10 DIAGNOSIS — E11.42 TYPE 2 DIABETES MELLITUS WITH DIABETIC POLYNEUROPATHY, WITH LONG-TERM CURRENT USE OF INSULIN: ICD-10-CM

## 2024-03-10 DIAGNOSIS — Z79.4 TYPE 2 DIABETES MELLITUS WITH DIABETIC POLYNEUROPATHY, WITH LONG-TERM CURRENT USE OF INSULIN: ICD-10-CM

## 2024-03-10 NOTE — TELEPHONE ENCOUNTER
No care due was identified.  [facility] Embedded Care Due Messages. Reference number: [id number].   3/10/2024 4:01:19 PM CDT

## 2024-03-11 ENCOUNTER — PATIENT MESSAGE (OUTPATIENT)
Dept: ADMINISTRATIVE | Facility: OTHER | Age: 54
End: 2024-03-11
Payer: COMMERCIAL

## 2024-03-11 ENCOUNTER — CLINICAL SUPPORT (OUTPATIENT)
Dept: ORTHOPEDICS | Facility: CLINIC | Age: 54
End: 2024-03-11
Payer: COMMERCIAL

## 2024-03-11 DIAGNOSIS — M17.11 PRIMARY OSTEOARTHRITIS OF RIGHT KNEE: Primary | ICD-10-CM

## 2024-03-11 PROCEDURE — 99499 UNLISTED E&M SERVICE: CPT | Mod: S$GLB,,, | Performed by: ORTHOPAEDIC SURGERY

## 2024-03-11 RX ORDER — METFORMIN HYDROCHLORIDE 500 MG/1
TABLET, EXTENDED RELEASE ORAL
Qty: 120 TABLET | Refills: 0 | Status: SHIPPED | OUTPATIENT
Start: 2024-03-11 | End: 2024-04-02 | Stop reason: ALTCHOICE

## 2024-03-11 NOTE — TELEPHONE ENCOUNTER
Refill Routing Note   Medication(s) are not appropriate for processing by [facility] for the following reason(s):        Required labs outdated  Patient not seen by provider within 15 months    [facility] action(s):  Defer               Appointments  past 12m or future 3m with PCP    Date Provider   Last Visit   7/12/2021 Sukumar Mackey, DO   Next Visit   Visit date not found Sukumar Mackey, DO   ED visits in past 90 days: 0        Note composed:1:54 PM 03/11/2024

## 2024-03-12 RX ORDER — HYDROCHLOROTHIAZIDE 25 MG/1
TABLET ORAL
Qty: 90 TABLET | Refills: 3 | Status: SHIPPED | OUTPATIENT
Start: 2024-03-12

## 2024-03-12 RX ORDER — AMLODIPINE BESYLATE 10 MG/1
10 TABLET ORAL
Qty: 90 TABLET | Refills: 3 | Status: SHIPPED | OUTPATIENT
Start: 2024-03-12

## 2024-03-12 RX ORDER — ATENOLOL 100 MG/1
TABLET ORAL
Qty: 90 TABLET | Refills: 3 | Status: SHIPPED | OUTPATIENT
Start: 2024-03-12

## 2024-03-12 RX ORDER — LOSARTAN POTASSIUM 100 MG/1
100 TABLET ORAL
Qty: 90 TABLET | Refills: 3 | Status: SHIPPED | OUTPATIENT
Start: 2024-03-12

## 2024-03-15 DIAGNOSIS — M17.11 PRIMARY OSTEOARTHRITIS OF RIGHT KNEE: Primary | ICD-10-CM

## 2024-03-18 ENCOUNTER — PATIENT MESSAGE (OUTPATIENT)
Dept: ADMINISTRATIVE | Facility: OTHER | Age: 54
End: 2024-03-18
Payer: COMMERCIAL

## 2024-03-19 DIAGNOSIS — M17.11 PRIMARY OSTEOARTHRITIS OF RIGHT KNEE: ICD-10-CM

## 2024-03-20 ENCOUNTER — TELEPHONE (OUTPATIENT)
Dept: PREADMISSION TESTING | Facility: HOSPITAL | Age: 54
End: 2024-03-20
Payer: COMMERCIAL

## 2024-03-20 DIAGNOSIS — M79.609 PAIN IN EXTREMITY, UNSPECIFIED EXTREMITY: ICD-10-CM

## 2024-03-20 DIAGNOSIS — Z01.818 PRE-OP TESTING: Primary | ICD-10-CM

## 2024-03-20 NOTE — ANESTHESIA PAT ROS NOTE
03/20/2024  Lopez Hicks is a 53 y.o., male.      Pre-op Assessment          Review of Systems           Anesthesia Assessment: Preoperative EQUATION    Planned Procedure: Procedure(s) (LRB):  ARTHROPLASTY, KNEE, TOTAL, USING COMPUTER-ASSISTED NAVIGATION: VELYS: RIGHT: DEPUY - ATTUNE: SAME DAY (Right)  Requested Anesthesia Type:Regional  Surgeon: Jt Carranza III, MD  Service: Orthopedics  Known or anticipated Date of Surgery:4/22/2024    Surgeon notes: reviewed    Electronic QUestionnaire Assessment completed via nurse interview with patient.        Triage considerations:     The patient has no apparent active cardiac condition (No unstable coronary Syndrome such as severe unstable angina or recent [<1 month] myocardial infarction, decompensated CHF, severe valvular   disease or significant arrhythmia)    Previous anesthesia records:Not available    Last PCP note: 3-6 months ago , within [facility]     Subspecialty notes: Endocrinology, Ortho    Other important co-morbidities: DM2, GERD, HLD, HTN, Obesity, BEVERLY, and R Lung Lobectomy (PNA), Mounjaro Use, PN, Anemia, Anxiety        Tests already available:  Available tests,  within [facility] .   2/21/24 A1C             Instructions given. (See in Nurse's note)    Optimization:  Anesthesia Preop Clinic Assessment  Indicated POC    Medical Opinion Indicated       Sub-specialist consult indicated:   [facility] Dr. Regan       Plan:    Testing:  CMP, EKG, Hematology Profile, and PT/INR   Pre-anesthesia  visit       Visit focus: possible regional anesthesia and/or nerve block      Consultation:IM Perioperative Hospitalist     Patient  has previously scheduled Medical Appointment: 3/22    Navigation: Tests Scheduled.              Consults scheduled.             Results will be tracked by Preop Clinic.     3/22/24 Patient is optimized     Patient/  care giver/ Family member was instructed to call and update me about any changes to health,  medication, office visits ,testing out side of the [facility] , hospitalizations between now and surgery       Blank Regan MD  Internal Medicine  [facility]   Cell Phone- [phone]

## 2024-03-21 ENCOUNTER — PATIENT MESSAGE (OUTPATIENT)
Dept: ENDOCRINOLOGY | Facility: CLINIC | Age: 54
End: 2024-03-21
Payer: COMMERCIAL

## 2024-03-21 DIAGNOSIS — E11.42 TYPE 2 DIABETES MELLITUS WITH DIABETIC POLYNEUROPATHY, WITH LONG-TERM CURRENT USE OF INSULIN: ICD-10-CM

## 2024-03-21 DIAGNOSIS — Z79.4 TYPE 2 DIABETES MELLITUS WITH DIABETIC POLYNEUROPATHY, WITH LONG-TERM CURRENT USE OF INSULIN: ICD-10-CM

## 2024-03-21 RX ORDER — INSULIN LISPRO 100 [IU]/ML
20 INJECTION, SOLUTION INTRAVENOUS; SUBCUTANEOUS
Qty: 5 EACH | Refills: 0 | Status: SHIPPED | OUTPATIENT
Start: 2024-03-21 | End: 2024-03-25

## 2024-03-22 ENCOUNTER — HOSPITAL ENCOUNTER (OUTPATIENT)
Dept: RADIOLOGY | Facility: HOSPITAL | Age: 54
Discharge: HOME | End: 2024-03-22
Attending: ORTHOPAEDIC SURGERY
Payer: COMMERCIAL

## 2024-03-22 ENCOUNTER — HOSPITAL ENCOUNTER (OUTPATIENT)
Dept: PREADMISSION TESTING | Facility: HOSPITAL | Age: 54
Discharge: HOME | End: 2024-03-22
Attending: ORTHOPAEDIC SURGERY
Payer: COMMERCIAL

## 2024-03-22 ENCOUNTER — OFFICE VISIT (OUTPATIENT)
Dept: ORTHOPEDICS | Facility: CLINIC | Age: 54
End: 2024-03-22
Payer: COMMERCIAL

## 2024-03-22 ENCOUNTER — TELEPHONE (OUTPATIENT)
Dept: INTERNAL MEDICINE | Facility: CLINIC | Age: 54
End: 2024-03-22
Payer: COMMERCIAL

## 2024-03-22 ENCOUNTER — OFFICE VISIT (OUTPATIENT)
Dept: INTERNAL MEDICINE | Facility: CLINIC | Age: 54
End: 2024-03-22
Payer: COMMERCIAL

## 2024-03-22 ENCOUNTER — HOSPITAL ENCOUNTER (OUTPATIENT)
Dept: CARDIOLOGY | Facility: CLINIC | Age: 54
Discharge: HOME | End: 2024-03-22
Payer: COMMERCIAL

## 2024-03-22 VITALS
DIASTOLIC BLOOD PRESSURE: 76 MMHG | OXYGEN SATURATION: 98 % | WEIGHT: 267.19 LBS | HEART RATE: 74 BPM | SYSTOLIC BLOOD PRESSURE: 121 MMHG | HEIGHT: 76 IN | HEIGHT: 75 IN | TEMPERATURE: 98 F | BODY MASS INDEX: 32.51 KG/M2 | WEIGHT: 267 LBS | RESPIRATION RATE: 16 BRPM | BODY MASS INDEX: 33.22 KG/M2

## 2024-03-22 VITALS — HEIGHT: 75 IN | BODY MASS INDEX: 33.24 KG/M2 | WEIGHT: 267.31 LBS

## 2024-03-22 DIAGNOSIS — E11.42 TYPE 2 DIABETES MELLITUS WITH DIABETIC POLYNEUROPATHY, WITH LONG-TERM CURRENT USE OF INSULIN: ICD-10-CM

## 2024-03-22 DIAGNOSIS — Z12.11 ENCOUNTER FOR SCREENING COLONOSCOPY: ICD-10-CM

## 2024-03-22 DIAGNOSIS — F41.9 ANXIETY: ICD-10-CM

## 2024-03-22 DIAGNOSIS — R41.840 DIFFICULTY CONCENTRATING: ICD-10-CM

## 2024-03-22 DIAGNOSIS — N52.9 ERECTILE DYSFUNCTION, UNSPECIFIED ERECTILE DYSFUNCTION TYPE: ICD-10-CM

## 2024-03-22 DIAGNOSIS — Z90.2 HISTORY OF LOBECTOMY OF LUNG: ICD-10-CM

## 2024-03-22 DIAGNOSIS — E11.69 HYPERLIPIDEMIA ASSOCIATED WITH TYPE 2 DIABETES MELLITUS: ICD-10-CM

## 2024-03-22 DIAGNOSIS — I10 HYPERTENSION, UNSPECIFIED TYPE: ICD-10-CM

## 2024-03-22 DIAGNOSIS — M17.11 PRIMARY OSTEOARTHRITIS OF RIGHT KNEE: Primary | ICD-10-CM

## 2024-03-22 DIAGNOSIS — Z79.4 TYPE 2 DIABETES MELLITUS WITH DIABETIC POLYNEUROPATHY, WITH LONG-TERM CURRENT USE OF INSULIN: ICD-10-CM

## 2024-03-22 DIAGNOSIS — T78.40XD ALLERGY, SUBSEQUENT ENCOUNTER: ICD-10-CM

## 2024-03-22 DIAGNOSIS — K21.9 GASTROESOPHAGEAL REFLUX DISEASE WITHOUT ESOPHAGITIS: Chronic | ICD-10-CM

## 2024-03-22 DIAGNOSIS — Z01.818 PRE-OP TESTING: ICD-10-CM

## 2024-03-22 DIAGNOSIS — M17.11 PRIMARY OSTEOARTHRITIS OF RIGHT KNEE: ICD-10-CM

## 2024-03-22 DIAGNOSIS — Z86.16 HISTORY OF COVID-19: ICD-10-CM

## 2024-03-22 DIAGNOSIS — Z87.19 HISTORY OF FATTY INFILTRATION OF LIVER: ICD-10-CM

## 2024-03-22 DIAGNOSIS — D64.9 ANEMIA, UNSPECIFIED TYPE: ICD-10-CM

## 2024-03-22 DIAGNOSIS — E78.5 HYPERLIPIDEMIA ASSOCIATED WITH TYPE 2 DIABETES MELLITUS: ICD-10-CM

## 2024-03-22 DIAGNOSIS — Z01.818 PREOP EXAMINATION: Primary | ICD-10-CM

## 2024-03-22 DIAGNOSIS — G47.30 SLEEP APNEA, UNSPECIFIED TYPE: ICD-10-CM

## 2024-03-22 PROBLEM — T78.40XA ALLERGIES: Status: ACTIVE | Noted: 2024-03-22

## 2024-03-22 PROBLEM — M25.562 ACUTE PAIN OF LEFT KNEE: Status: RESOLVED | Noted: 2022-07-26 | Resolved: 2024-03-22

## 2024-03-22 LAB
OHS QRS DURATION: 104 MS
OHS QTC CALCULATION: 412 MS

## 2024-03-22 PROCEDURE — 93005 ELECTROCARDIOGRAM TRACING: CPT | Mod: S$GLB,,, | Performed by: ANESTHESIOLOGY

## 2024-03-22 PROCEDURE — 73560 X-RAY EXAM OF KNEE 1 OR 2: CPT | Mod: TC,RT

## 2024-03-22 PROCEDURE — 99215 OFFICE O/P EST HI 40 MIN: CPT | Mod: S$GLB,,, | Performed by: HOSPITALIST

## 2024-03-22 PROCEDURE — 99499 UNLISTED E&M SERVICE: CPT | Mod: S$GLB,,, | Performed by: ORTHOPAEDIC SURGERY

## 2024-03-22 PROCEDURE — 99999 PR PBB SHADOW E&M-EST. PATIENT-LVL IV: CPT | Mod: PBBFAC,,, | Performed by: ORTHOPAEDIC SURGERY

## 2024-03-22 PROCEDURE — 99417 PROLNG OP E/M EACH 15 MIN: CPT | Mod: S$GLB,,, | Performed by: HOSPITALIST

## 2024-03-22 PROCEDURE — 99214 OFFICE O/P EST MOD 30 MIN: CPT | Mod: S$GLB,,, | Performed by: NURSE PRACTITIONER

## 2024-03-22 PROCEDURE — 73560 X-RAY EXAM OF KNEE 1 OR 2: CPT | Mod: 26,RT,, | Performed by: RADIOLOGY

## 2024-03-22 PROCEDURE — 99999 PR PBB SHADOW E&M-EST. PATIENT-LVL III: CPT | Mod: PBBFAC,,, | Performed by: NURSE PRACTITIONER

## 2024-03-22 PROCEDURE — 93010 ELECTROCARDIOGRAM REPORT: CPT | Mod: S$GLB,,, | Performed by: INTERNAL MEDICINE

## 2024-03-22 PROCEDURE — 99999 PR PBB SHADOW E&M-EST. PATIENT-LVL V: CPT | Mod: PBBFAC,,, | Performed by: HOSPITALIST

## 2024-03-22 NOTE — OUTPATIENT SUBJECTIVE & OBJECTIVE
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       Review of Systems   Constitutional:  Negative for chills and fever.        He nicely lost weight and his insulin requirement has come down which will continue to come down with increased physical activity and weight loss   HENT:          Sleep apnea    Eyes:         No unusual vision changes   Respiratory:          No cough , phlegm    No Hemoptysis   Cardiovascular:         As noted   Gastrointestinal:         Bowels- Regular   No overt GI/ blood losses   Endocrine:        Prednisone use > 20 mg daily for 3 weeks- none   Genitourinary:  Negative for dysuria.        No urinary hesitancy    Musculoskeletal:         As above      Skin:  Negative for rash.   Neurological:  Negative for syncope.        No unilateral weakness   Hematological:         Current use of Anticoagulants  None   Psychiatric/Behavioral:           Anxiety - Controlled      Stent in the blood vessel- none    No past medical history pertinent negatives.        No anesthesia, bleeding, cardiac problems, PONV with previous surgeries/procedures.  Medications and Allergies reviewed in epic.   FH- No anesthesia,bleeding / venous thrombosis ,in family      Physical Exam  Blood pressure 121/76, pulse 74, temperature 97.5 °F (36.4 °C), temperature source Oral, resp. rate 16, height 6' 4" " (1.93 m), weight 121.1 kg (267 lb), SpO2 98 %.      Physical Exam  Constitutional- Vitals - Body mass index is 32.5 kg/m².,   Vitals:    03/22/24 1052   BP: 121/76   Pulse: 74   Resp: 16   Temp: 97.5 °F (36.4 °C)     General appearance-Conscious,Coherent  Eyes- No conjunctival icterus,pupils  round  and reactive to light   ENT-Oral cavity- moist    , Hearing grossly normal   Neck- No thyromegaly ,Trachea -central, No jugular venous distension,   No Carotid Bruit   Cardiovascular -Heart Sounds- Normal  and  no murmur   , No gallop rhythm   Respiratory - Normal Respiratory Effort, Normal breath sounds,  no wheeze , and  no forced expiratory wheeze    Peripheral pitting pedal edema-- none , no calf pain   Gastrointestinal -Soft abdomen, No palpable masses, Non Tender,Liver,Spleen not palpable. No-- free fluid and shifting dullness  Musculoskeletal- No finger Clubbing. Strength grossly normal   Lymphatic-No Palpable cervical, axillary,Inguinal lymphadenopathy   Psychiatric - normal effect,Orientation  Rt Dorsalis pedis pulses-palpable    Lt Dorsalis pedis pulses- palpable   Rt Posterior tibial pulses -palpable   Left posterior tibial pulses -palpable   Miscellaneous -  no asterixis,  no dupuytren's contracture,  Surgical scarleft lower back ,  no renal bruit, and osteoarthritic nodes    Investigations  Lab and Imaging have been reviewed in epic.    Review of Medicine tests    EKG- I had independently reviewed the EKG from--  It was reported to be showing     Review of clinical lab tests:  Lab Results   Component Value Date    CREATININE 1.28 11/14/2022    HGB 11.7 (L) 03/22/2024     03/22/2024           Review of old records- Was done and information gathered regards to events leading to surgery and health conditions of significance in the perioperative period.    Outpatient Subjective & Objective

## 2024-03-22 NOTE — ASSESSMENT & PLAN NOTE
2022  He did not require intubation or hospitalization or supplemental oxygen use  He had the COVID vaccine    COVID screening     No fever   No cough   No SOB  No sore throat   No loss of taste or smell   No muscle aches   No nausea, vomiting , diarrhea

## 2024-03-22 NOTE — ASSESSMENT & PLAN NOTE
He takes Wellbutrin that helps with his temper    I suggest monitoring the sodium as SIADH from Wellbutrin  use and hypersecretion of ADH associated with surgery can reduce sodium in the perioperative period

## 2024-03-22 NOTE — PROGRESS NOTES
"  Pre-op R TKA 4/22/24  Surgery delayed due to peer to peer reviewer requirement to do PT 1x/week x6 weeks  Has been doing PT, pain and limitations persist "its killing me" lots of pain and sweling with PT    No interval changes    PE unchanged    This patient has significant symptoms in their knee that are affecting their quality of life and daily activities.  They have tried non-operative treatment including analgesics, an exercise program, and activity modification, but the symptoms have persisted. I believe they make a good candidate for knee arthroplasty.     The risks and benefits of knee arthroplasty have been discussed with the patient which include, but are not limited to infections (including severe sequelae), potential component failure, fracture, DVT, pulmonary embolus, nerve palsy, poor range of motion, the possibility of bone grafting, persistent pain, wound healing complications, transfusions, medical complications and death.     We discussed surgical options including implant type and why I believe the implant selected is a good match for the patient's needs. The patient expressed understanding and agrees to proceed with the planned surgery.     Pre-operative planning will include the following:  A pre-surgical evaluation by will be arranged.  Pre-op orders will be placed.  We will make arrangements with the operating room for proper time and staffing.  We will make Social Service arrangements for the patient.    Implants:   Company: [facility]  System: [email or web address]  Velys PF     DVT prophylaxis: ASA 81mg BID x1 month    Dispo: outpatient PT     Admission status:  same day discharge     Location: [place] [facility]   [place] trip April 5-12      "

## 2024-03-22 NOTE — ASSESSMENT & PLAN NOTE
He does good with the reflux as long as he is taking the medication for  Does not sound Cardiac       GERD-  I suggest continuation of the Proton pump inhibitor in the perioperative period . I suggest aspiration precautions

## 2024-03-22 NOTE — ASSESSMENT & PLAN NOTE
Mildly low, which is not new  I have discussed that with him for follow up reasons  Anemia:  I suggest monitoring his Hemoglobin perioperatively in view of his history of pre existing anemia.

## 2024-03-22 NOTE — ASSESSMENT & PLAN NOTE
He lost about 40 lb in the last 3-4 months taking Mounjaro   He wants to lose some more weight   Have encouraged him that with increased mobility after joint replacement surgery, he will likely to be able to lose weight which translates to improving his health conditions like diabetes, blood pressure, cholesterol, sleep apnea, acid reflux    Weight related conditions     Known to have     HTN  Type 2 Diabetes   Hyperlipidemia   Sleep apnea   Acid reflux   Osteoarthritis    Not troubled with / Not known to have       Fatty liver       Encouraged weight loss     He nicely lost weight and his insulin requirement has come down which will continue to come down with increased physical activity and weight loss

## 2024-03-22 NOTE — ASSESSMENT & PLAN NOTE
R Lung Lobectomy (PNA)  He had this about 15 years ago when he was about 38 when his diabetes was not quite control at that time and he had pneumonia that he manifested as pleuritic pain but otherwise was well but required a lobectomy  He has since been doing well with no trouble breathing  He has had no history of tuberculosis

## 2024-03-22 NOTE — ASSESSMENT & PLAN NOTE
Uses CPAP  .Suggested bringing for hospital use . Informed the risk of worsening sleep apnea in the perioperative period and suggest using CPAP/ use any time in 24 hrs ( day or night )for planned sleep    I suggest  caution with usage of medication that can cause respiratory suppression in the perioperative period       Avoidance of  supine sleep, weight gain , alcoholic beverages , care with , sedative , CNS depressant use indicated  since all of these can worsen BEVERLY

## 2024-03-22 NOTE — ASSESSMENT & PLAN NOTE
He is taking pravastatin and fenofibrate  HLD-I  suggest continuation of statin during the entire perioperative period.     For diabetes he is taking metformin, short-acting insulin 3 times a day and long-acting insulin as needed and Mounjaro every Sunday  I have suggested for him to skip 1 dose of Mounjaro prior to surgery which  is a day before surgery     Hemoglobin A1c- 7.1  He has a continuous glucose monitor with alarms set for high glucose as well as low glucose  He has hypoglycemia if he takes insulin and does not take food  I suggested not to space out short-acting insulin and food intake as it can cause low sugar  He has hypoglycemia awareness    Capillary glucose check-  Pre breakfast -140-160  Pre lunch -140  Pre [id number]      Diabetes Complications     Microvascular     Not known to have   Diabetes affecting the  Kidneys   As per the chart, mild retinopathy  Tingling numbness of  feet  No reported open areas on the feet   Feet care suggested     Macrovascular     No stroke/ TIA  Not known to have CAD  No suggestion of  lower extremity claudication      Diabetes Mellitus-I suggest monitoring the glucose in the perioperative period ( Before meals and bed time,if the patient is on oral feeds or every 6 hourly ,if the patient is NPO )  Blood glucose target in hospitalized patients is 140-180. Oral Hypoglycemic agents are generally avoided during the hospital stay . If glucose is consistently elevated ,I suggest using basal ,prandial Insulin regimen to control the glucose , as elevated glucose can be associated with adverse surgical out comes. Please consider involving Hospital Medicine or Endocrinology ,if any help is needed with Glucose control. Patient will be instructed based on the pre op clinic guidelines  about adjustment of diabetic treatment (If applicable )  considering the NPO status for Surgery      I had educated that uncontrolled DM can cause post op complications,risk of infection, wound  healing problem,increased length of stay in hospital and its associated complications.I suggest exercise as much as possible and follow diabetic diet     I have suggested for him to not to take the short-acting insulin on the morning of surgery as he would not be eating on the morning of surgery  I suggested for him to take 80% of the long-acting insulin if he was to need it night before surgery and I suggested for him to take 50% of the long-acting insulin, if he was to need it on the morning of surgery  He is not to take metformin the night before surgery on the morning of surgery    He takes aspirin for preventative reason and is not known to have any blockages    I have suggested to him that he does not need to stop aspirin for surgery

## 2024-03-22 NOTE — ASSESSMENT & PLAN NOTE
2008     No history  of cirrhosis of liver or suggestions of Liver  decompensation   No Jaundice , dark urine , pale stool   This was in the past and him losing weight would have helped his fatty liver  I have encouraged maintaining healthy weight

## 2024-03-22 NOTE — PROGRESS NOTES
[facility]  Progress Note    Patient Name: Lopez Hicks  MRN: [id number]  Date of Evaluation- 03/22/2024  PCP- Sukumar Mackey, DO    Future cases for Jose Hicks [[id number]]       Case ID Status Date Time Charles Procedure Provider Location    [id number] [facility] 4/22/2024  7:00  ARTHROPLASTY, KNEE, TOTAL, USING COMPUTER-ASSISTED NAVIGATION: VELYS: RIGHT: DEPUY - ATTUNE: SAME DAY Jt Carranza III, MD [[id number]] [facility] OR            HPI:  History of present illness- I had the pleasure of meeting this pleasant 53 y.o. gentleman in the pre op clinic prior to his elective Orthopedic surgery. The patient is new to me .  I have offered to have his wife on the phone during the consultation process  I have obtained the history by speaking to the patient and by reviewing the electronic health records.    Events leading up to surgery / History of presenting illness -    He has been troubled with severe  Rt knee  pain for about 1 year and he experiences the pain upon movement .   Pain increases with activity and decreases with resting.  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  He has help available after surgery             Subjective/ Objective:     Chief complaint-Preoperative evaluation, Perioperative Medical management, complication reduction plan     Active cardiac conditions-  "none    Revised cardiac risk index predictors- insulin requiring diabetes mellitus    Functional capacity -Examples of physical activity , he does a physical job, 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       Review of Systems   Constitutional:  Negative for chills and fever.        He nicely lost weight and his insulin requirement has come down which will continue to come down with increased physical activity and weight loss   HENT:          Sleep apnea    Eyes:         No unusual vision changes   Respiratory:          No cough , phlegm    No Hemoptysis   Cardiovascular:         As noted   Gastrointestinal:         Bowels- Regular   No overt GI/ blood losses   Endocrine:        Prednisone use > 20 mg daily for 3 weeks- none   Genitourinary:  Negative for dysuria.        No urinary hesitancy    Musculoskeletal:         As above      Skin:  Negative for rash.   Neurological:  Negative for syncope.        No unilateral weakness   Hematological:         Current use of Anticoagulants  None   Psychiatric/Behavioral:           Anxiety - Controlled      Stent in the blood vessel- none    No past medical history pertinent negatives.        No anesthesia, bleeding, cardiac problems, PONV with previous surgeries/procedures.  Medications and Allergies reviewed in epic.   FH- No anesthesia,bleeding / venous thrombosis ,in family      Physical Exam  Blood pressure 121/76, pulse 74, temperature 97.5 °F (36.4 °C), temperature source Oral, resp. rate 16, height 6' 4" (1.93 m), weight 121.1 kg (267 lb), SpO2 98 %.      Physical Exam  Constitutional- Vitals - Body mass index is 32.5 kg/m².,   Vitals:    03/22/24 1052   BP: 121/76   Pulse: " 74   Resp: 16   Temp: 97.5 °F (36.4 °C)     General appearance-Conscious,Coherent  Eyes- No conjunctival icterus,pupils  round  and reactive to light   ENT-Oral cavity- moist    , Hearing grossly normal   Neck- No thyromegaly ,Trachea -central, No jugular venous distension,   No Carotid Bruit   Cardiovascular -Heart Sounds- Normal  and  no murmur   , No gallop rhythm   Respiratory - Normal Respiratory Effort, Normal breath sounds,  no wheeze , and  no forced expiratory wheeze    Peripheral pitting pedal edema-- none , no calf pain   Gastrointestinal -Soft abdomen, No palpable masses, Non Tender,Liver,Spleen not palpable. No-- free fluid and shifting dullness  Musculoskeletal- No finger Clubbing. Strength grossly normal   Lymphatic-No Palpable cervical, axillary,Inguinal lymphadenopathy   Psychiatric - normal effect,Orientation  Rt Dorsalis pedis pulses-palpable    Lt Dorsalis pedis pulses- palpable   Rt Posterior tibial pulses -palpable   Left posterior tibial pulses -palpable   Miscellaneous -  no asterixis,  no dupuytren's contracture,  Surgical scarleft lower back ,  no renal bruit, and osteoarthritic nodes    Investigations  Lab and Imaging have been reviewed in epic.    Review of Medicine tests    EKG- I had independently reviewed the EKG from--  It was reported to be showing     Review of clinical lab tests:  Lab Results   Component Value Date    CREATININE 1.28 11/14/2022    HGB 11.7 (L) 03/22/2024     03/22/2024           Review of old records- Was done and information gathered regards to events leading to surgery and health conditions of significance in the perioperative period.        Preoperative cardiac risk assessment-  The patient does not have any active cardiac conditions . Revised cardiac risk index predictors-1 ---.Functional capacity is more than 4 Mets. He will be undergoing a Orthopedic procedure that carries a Moderate Risk risk     Risk of a major Cardiac event ( Defined as death,  myocardial infarction, or cardiac arrest at 30 days after noncardiac surgery), based on RCRI score     -3.9%       No further cardiac work up is indicated prior to proceeding with the surgery          American Society of Anesthesiologists Physical status classification ( ASA ) class: 3     Postoperative pulmonary complication risk assessment:      ARISCAT ( Canet) risk index- risk class -  Low, if duration of surgery is under 3 hours, intermediate, if duration of surgery is over 3 hours      Assessment/Plan:     Difficulty concentrating  He was diagnosed with ADD when he was younger  He is able to maintain his job currently    Anxiety  He takes Wellbutrin that helps with his temper    I suggest monitoring the sodium as SIADH from Wellbutrin  use and hypersecretion of ADH associated with surgery can reduce sodium in the perioperative period     Hyperlipidemia associated with type 2 diabetes mellitus  He is taking pravastatin and fenofibrate  HLD-I  suggest continuation of statin during the entire perioperative period.     For diabetes he is taking metformin, short-acting insulin 3 times a day and long-acting insulin as needed and Mounjaro every Sunday  I have suggested for him to skip 1 dose of Mounjaro prior to surgery which  is a day before surgery     Hemoglobin A1c- 7.1  He has a continuous glucose monitor with alarms set for high glucose as well as low glucose  He has hypoglycemia if he takes insulin and does not take food  I suggested not to space out short-acting insulin and food intake as it can cause low sugar  He has hypoglycemia awareness    Capillary glucose check-  Pre breakfast -140-160  Pre lunch -140  Pre bvinnk-[id number]      Diabetes Complications     Microvascular     Not known to have   Diabetes affecting the  Kidneys   As per the chart, mild retinopathy  Tingling numbness of  feet  No reported open areas on the feet   Feet care suggested     Macrovascular     No stroke/ TIA  Not known to have  CAD  No suggestion of  lower extremity claudication      Diabetes Mellitus-I suggest monitoring the glucose in the perioperative period ( Before meals and bed time,if the patient is on oral feeds or every 6 hourly ,if the patient is NPO )  Blood glucose target in hospitalized patients is 140-180. Oral Hypoglycemic agents are generally avoided during the hospital stay . If glucose is consistently elevated ,I suggest using basal ,prandial Insulin regimen to control the glucose , as elevated glucose can be associated with adverse surgical out comes. Please consider involving Hospital Medicine or Endocrinology ,if any help is needed with Glucose control. Patient will be instructed based on the pre op clinic guidelines  about adjustment of diabetic treatment (If applicable )  considering the NPO status for Surgery      I had educated that uncontrolled DM can cause post op complications,risk of infection, wound healing problem,increased length of stay in hospital and its associated complications.I suggest exercise as much as possible and follow diabetic diet     I have suggested for him to not to take the short-acting insulin on the morning of surgery as he would not be eating on the morning of surgery  I suggested for him to take 80% of the long-acting insulin if he was to need it night before surgery and I suggested for him to take 50% of the long-acting insulin, if he was to need it on the morning of surgery  He is not to take metformin the night before surgery on the morning of surgery    He takes aspirin for preventative reason and is not known to have any blockages    I have suggested to him that he does not need to stop aspirin for surgery            Hypertension  For hypertension, he is currently taking amlodipine, atenolol, hydralazine, hydrochlorothiazide   He is only able to take hydralazine 2 times a day as he does not carry medication to work    Hypertension-  Blood pressure is acceptable . I suggest  continuation of --amlodipine, atenolol, hydralazine------ during the entire perioperative period. I suggest holding -hydrochlorothiazide------- on the morning of the surgery and can continue that  post operatively under blood pressure, electrolyte and renal function monitoring as long as they are acceptable.I suggest addressing pain control as uncontrolled pain can increased blood pressure      Erectile dysfunction  He has not known to have pituitary or adrenal problems    Encounter for screening colonoscopy  He is due to have colonoscopy-routine  I suggested to work with the surgeon about the timing of colonoscopy    Gastroesophageal reflux disease without esophagitis  He does good with the reflux as long as he is taking the medication for  Does not sound Cardiac       GERD-  I suggest continuation of the Proton pump inhibitor in the perioperative period . I suggest aspiration precautions     Sleep apnea  Uses CPAP  .Suggested bringing for hospital use . Informed the risk of worsening sleep apnea in the perioperative period and suggest using CPAP/ use any time in 24 hrs ( day or night )for planned sleep    I suggest  caution with usage of medication that can cause respiratory suppression in the perioperative period       Avoidance of  supine sleep, weight gain , alcoholic beverages , care with , sedative , CNS depressant use indicated  since all of these can worsen BEVERLY         Allergies  He is doing good from an allergy standpoint    BMI 32.0-32.9,adult  He lost about 40 lb in the last 3-4 months taking Mounjaro   He wants to lose some more weight   Have encouraged him that with increased mobility after joint replacement surgery, he will likely to be able to lose weight which translates to improving his health conditions like diabetes, blood pressure, cholesterol, sleep apnea, acid reflux    Weight related conditions     Known to have     HTN  Type 2 Diabetes   Hyperlipidemia   Sleep apnea   Acid reflux    Osteoarthritis    Not troubled with / Not known to have       Fatty liver       Encouraged weight loss     He nicely lost weight and his insulin requirement has come down which will continue to come down with increased physical activity and weight loss    History of lobectomy of lung  R Lung Lobectomy (PNA)  He had this about 15 years ago when he was about 38 when his diabetes was not quite control at that time and he had pneumonia that he manifested as pleuritic pain but otherwise was well but required a lobectomy  He has since been doing well with no trouble breathing  He has had no history of tuberculosis    Anemia  Mildly low, which is not new  I have discussed that with him for follow up reasons  Anemia:  I suggest monitoring his Hemoglobin perioperatively in view of his history of pre existing anemia.     History of fatty infiltration of liver  2008     No history  of cirrhosis of liver or suggestions of Liver  decompensation   No Jaundice , dark urine , pale stool   This was in the past and him losing weight would have helped his fatty liver  I have encouraged maintaining healthy weight     History of COVID-19  2022  He did not require intubation or hospitalization or supplemental oxygen use  He had the COVID vaccine    COVID screening     No fever   No cough   No SOB  No sore throat   No loss of taste or smell   No muscle aches   No nausea, vomiting , diarrhea         Preventive perioperative care    Thromboembolic prophylaxis:  His risk factors for thrombosis include surgical procedure and age.I suggest  thromboembolic prophylaxis ( mechanical/pharmacological, weighing the risk benefits of pharmacological agent use considering [facility] procedural bleeding )  during the perioperative period.I suggested being active in the post operative period.   The patient is a candidate for extended DVT prophylaxis      Postoperative pulmonary complication prophylaxis-Risk factors for post operative pulmonary complications  include ASA class >2- I suggest incentive spirometry use, early ambulation, and end tidal carbon dioxide monitoring  , oral care , head end of bed elevation      Renal complication prophylaxis-Risk factors for renal complications include diabetes mellitus and hypertension . I suggest keeping him well hydrated  in the perioperative period.      Surgical site Infection Prophylaxis-I  suggest appropriate antibiotic for Prophylaxis against Surgical site infections  No reported Staph infection  Skin antibacterial discussed          In view of regional anesthesia the patient  is at risk of postoperative urinary retention.  I suggest avoidance / minimizing the of  Benzodiazepines,Anticholinergic medication,antihistamines ( Benadryl) , if possible in the perioperative period. I suggest using the minimum possible use of opioids for the minimum period of time in the perioperative period. Benadryl avoidance suggested      This visit was focused on Preoperative evaluation, Perioperative Medical management, complication reduction plans. I suggest that the patient follows up with primary care or relevant sub specialists for ongoing health care.    I appreciate the opportunity to be involved in this patients care. Please feel free to contact me if there were any questions about this consultation.    Patient is optimized    Patient/ care giver/ Family member was instructed to call and update me about any changes to health,  medication, office visits ,testing out side of the [facility] , hospitalizations between now and surgery      Blank Regan MD  Internal Medicine  [facility]   Cell Phone- [phone]    Checked for over-the-counter medication    I have spent --70---- minutes of time which includes, time spent to prepare to see the patient , obtaining history ,performing examination, counseling/Educating the patient , Documenting clinical information in the record    --  3/22/2024-  1847    Followed up on the chemistry   Alk-phos mildly low    EKG from today personally reviewed reportedly showed    Ectopic atrial rhythm   Nonspecific T wave abnormality   Abnormal ECG   No previous ECGs available     Clinically, he is doing good from a cardiac standpoint  --  3/26/2024- 1335     Corresponded with the cardiologist  about the ectopic atrial rhythm and it was felt that the ectopic atrial rhythm is a very stable rhythm and is not a problem for surgery    With regards to elevated alkaline phosphatase-TSH is normal    Called and spoke to him   Discussed EKG  No Jaundice dark urine pale stool or cirrhosis of liver  No overt hypo thyroid symptoms  Not known to have magnesium or zinc deficiency    No changes to medication or health  ----  4/19/2024- 1757    Called to follow up , spoke to him  to address any concerns with the up coming surgery or any questions on Medication instructions -  Doing well ,No changes to Medication, Health -

## 2024-03-22 NOTE — HPI
History of present illness- I had the pleasure of meeting this pleasant 53 y.o. gentleman in the pre op clinic prior to his elective Orthopedic surgery. The patient is new to me .  I have offered to have his wife on the phone during the consultation process  I have obtained the history by speaking to the patient and by reviewing the electronic health records.    Events leading up to surgery / History of presenting illness -    He has been troubled with severe  Rt knee  pain for about 1 year and he experiences the pain upon movement .   Pain increases with activity and decreases with resting.  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

## 2024-03-22 NOTE — ASSESSMENT & PLAN NOTE
For hypertension, he is currently taking amlodipine, atenolol, hydralazine, hydrochlorothiazide   He is only able to take hydralazine 2 times a day as he does not carry medication to work    Hypertension-  Blood pressure is acceptable . I suggest continuation of --amlodipine, atenolol, hydralazine------ during the entire perioperative period. I suggest holding -hydrochlorothiazide------- on the morning of the surgery and can continue that  post operatively under blood pressure, electrolyte and renal function monitoring as long as they are acceptable.I suggest addressing pain control as uncontrolled pain can increased blood pressure

## 2024-03-22 NOTE — ASSESSMENT & PLAN NOTE
He is due to have colonoscopy-routine  I suggested to work with the surgeon about the timing of colonoscopy

## 2024-03-23 ENCOUNTER — PATIENT MESSAGE (OUTPATIENT)
Dept: ADMINISTRATIVE | Facility: OTHER | Age: 54
End: 2024-03-23
Payer: COMMERCIAL

## 2024-03-25 ENCOUNTER — PATIENT MESSAGE (OUTPATIENT)
Dept: ADMINISTRATIVE | Facility: OTHER | Age: 54
End: 2024-03-25
Payer: COMMERCIAL

## 2024-03-25 RX ORDER — INSULIN LISPRO 100 [IU]/ML
20 INJECTION, SOLUTION INTRAVENOUS; SUBCUTANEOUS
Qty: 30 ML | Refills: 1 | Status: SHIPPED | OUTPATIENT
Start: 2024-03-25 | End: 2024-04-02 | Stop reason: SDUPTHER

## 2024-03-25 RX ORDER — INSULIN DEGLUDEC 200 U/ML
INJECTION, SOLUTION SUBCUTANEOUS
Qty: 10 PEN | Refills: 1 | Status: SHIPPED | OUTPATIENT
Start: 2024-03-25 | End: 2024-04-02 | Stop reason: SDUPTHER

## 2024-04-01 RX ORDER — CEFAZOLIN SODIUM 2 G/50ML
2 SOLUTION INTRAVENOUS
Status: CANCELLED | OUTPATIENT
Start: 2024-04-01 | End: 2024-04-02

## 2024-04-01 RX ORDER — CELECOXIB 200 MG/1
400 CAPSULE ORAL ONCE
Status: CANCELLED | OUTPATIENT
Start: 2024-04-01 | End: 2024-04-01

## 2024-04-01 RX ORDER — FENTANYL CITRATE 50 UG/ML
100 INJECTION, SOLUTION INTRAMUSCULAR; INTRAVENOUS
Status: CANCELLED | OUTPATIENT
Start: 2024-04-01 | End: 2024-04-02

## 2024-04-01 RX ORDER — MIDAZOLAM HYDROCHLORIDE 1 MG/ML
4 INJECTION, SOLUTION INTRAMUSCULAR; INTRAVENOUS
Status: CANCELLED | OUTPATIENT
Start: 2024-04-01 | End: 2024-04-02

## 2024-04-01 RX ORDER — ACETAMINOPHEN 500 MG
1000 TABLET ORAL EVERY 6 HOURS
Status: CANCELLED | OUTPATIENT
Start: 2024-04-01

## 2024-04-01 RX ORDER — MUPIROCIN 20 MG/G
1 OINTMENT TOPICAL
Status: CANCELLED | OUTPATIENT
Start: 2024-04-01

## 2024-04-01 RX ORDER — NALOXONE HCL 0.4 MG/ML
0.02 VIAL (ML) INJECTION
Status: CANCELLED | OUTPATIENT
Start: 2024-04-01

## 2024-04-01 RX ORDER — SODIUM CHLORIDE 9 MG/ML
INJECTION, SOLUTION INTRAVENOUS CONTINUOUS
Status: CANCELLED | OUTPATIENT
Start: 2024-04-01 | End: 2024-04-02

## 2024-04-01 RX ORDER — PROCHLORPERAZINE EDISYLATE 5 MG/ML
5 INJECTION INTRAMUSCULAR; INTRAVENOUS EVERY 6 HOURS PRN
Status: CANCELLED | OUTPATIENT
Start: 2024-04-01

## 2024-04-01 RX ORDER — FAMOTIDINE 20 MG/1
20 TABLET, FILM COATED ORAL 2 TIMES DAILY
Status: CANCELLED | OUTPATIENT
Start: 2024-04-01

## 2024-04-01 RX ORDER — AMOXICILLIN 250 MG
1 CAPSULE ORAL 2 TIMES DAILY
Status: CANCELLED | OUTPATIENT
Start: 2024-04-01

## 2024-04-01 RX ORDER — BISACODYL 10 MG/1
10 SUPPOSITORY RECTAL EVERY 12 HOURS PRN
Status: CANCELLED | OUTPATIENT
Start: 2024-04-01

## 2024-04-01 RX ORDER — OXYCODONE HYDROCHLORIDE 5 MG/1
5 TABLET ORAL
Status: CANCELLED | OUTPATIENT
Start: 2024-04-01

## 2024-04-01 RX ORDER — SODIUM CHLORIDE 9 MG/ML
INJECTION, SOLUTION INTRAVENOUS
Status: CANCELLED | OUTPATIENT
Start: 2024-04-01

## 2024-04-01 RX ORDER — METHOCARBAMOL 750 MG/1
750 TABLET, FILM COATED ORAL 3 TIMES DAILY
Status: CANCELLED | OUTPATIENT
Start: 2024-04-01

## 2024-04-01 RX ORDER — SODIUM CHLORIDE 0.9 % (FLUSH) 0.9 %
10 SYRINGE (ML) INJECTION
Status: CANCELLED | OUTPATIENT
Start: 2024-04-01

## 2024-04-01 RX ORDER — ONDANSETRON HYDROCHLORIDE 2 MG/ML
4 INJECTION, SOLUTION INTRAVENOUS EVERY 8 HOURS PRN
Status: CANCELLED | OUTPATIENT
Start: 2024-04-01

## 2024-04-01 RX ORDER — OXYCODONE HYDROCHLORIDE 5 MG/1
10 TABLET ORAL
Status: CANCELLED | OUTPATIENT
Start: 2024-04-01

## 2024-04-01 RX ORDER — PREGABALIN 75 MG/1
75 CAPSULE ORAL
Status: CANCELLED | OUTPATIENT
Start: 2024-04-01

## 2024-04-01 RX ORDER — PREGABALIN 75 MG/1
75 CAPSULE ORAL NIGHTLY
Status: CANCELLED | OUTPATIENT
Start: 2024-04-01

## 2024-04-01 RX ORDER — LIDOCAINE HYDROCHLORIDE 10 MG/ML
1 INJECTION, SOLUTION EPIDURAL; INFILTRATION; INTRACAUDAL; PERINEURAL
Status: CANCELLED | OUTPATIENT
Start: 2024-04-01

## 2024-04-01 RX ORDER — ACETAMINOPHEN 500 MG
1000 TABLET ORAL
Status: CANCELLED | OUTPATIENT
Start: 2024-04-01

## 2024-04-01 RX ORDER — ASPIRIN 81 MG/1
81 TABLET ORAL 2 TIMES DAILY
Status: CANCELLED | OUTPATIENT
Start: 2024-04-01

## 2024-04-01 RX ORDER — POLYETHYLENE GLYCOL 3350 17 G/17G
17 POWDER, FOR SOLUTION ORAL DAILY
Status: CANCELLED | OUTPATIENT
Start: 2024-04-01

## 2024-04-01 NOTE — H&P
CC:  Right knee pain    Lopez Hicks is a 53 y.o. male with history of Right knee pain. Pain is worse with activity and weight bearing.  Patient has experienced interference of activities of daily living due to decreased range of motion and an increase in joint pain and swelling.  Patient has failed non-operative treatment including NSAIDs, corticosteroid injections, viscosupplement injections, and activity modification.  Lopez Hicks currently ambulates independently.     Relevant medical conditions of significance in perioperative period:  DM type 2- on medication managed by pcp  HTN- on medication managed by pcp  HLD- on medication managed by pcp  Anxiety- on medication managed by pcp    Past Medical History:   Diagnosis Date    Anxiety     Arthritis     Diabetes mellitus, type 2     GERD (gastroesophageal reflux disease)     Hyperlipidemia     Hypertension     Mild nonproliferative diabetic retinopathy 2015       Past Surgical History:   Procedure Laterality Date    CHOLECYSTECTOMY      LUNG LOBECTOMY Right     after severe pneumonia    NASAL/SINUS ENDOSCOPY      ROTATOR CUFF REPAIR Bilateral     Dr. JERRY Cardona, and Dr. Jung.       Family History   Problem Relation Age of Onset    Hypertension Mother     Diabetes Father     Hyperlipidemia Father     Hypertension Father     Heart disease Father     Prostate cancer Father 60        radioactive seed    Diabetes Paternal Grandmother        Review of patient's allergies indicates:   Allergen Reactions    Lisinopril      Muscle aches and joint stiffness     Ozempic [semaglutide]      Severe heartburn          Current Outpatient Medications:     acetaminophen (TYLENOL) 650 MG TbSR, Take 1,300 mg by mouth daily as needed., Disp: , Rfl:     acetaZOLAMIDE (DIAMOX) 500 mg CpSR, 1 tab PO BID, start 24 hrs before ascent and take for at least 48 hrs at altitude, Disp: 30 capsule, Rfl: 0    amLODIPine (NORVASC) 10 MG tablet, TAKE 1 TABLET BY MOUTH EVERY DAY,  "Disp: 90 tablet, Rfl: 3    aspirin (ECOTRIN) 81 MG EC tablet, Take 81 mg by mouth once daily., Disp: , Rfl:     atenoloL (TENORMIN) 100 MG tablet, TAKE 1 TABLET BY MOUTH ONCE DAILY, Disp: 90 tablet, Rfl: 3    blood sugar diagnostic Strp, Check blood glucose 4 times a day - before meals and at bedtime., Disp: 400 strip, Rfl: 3    blood-glucose sensor (DEXCOM G7 SENSOR) Stephanie, Use as directed. Change every 10 days., Disp: 9 each, Rfl: 3    blood-glucose transmitter (DEXCOM G6 TRANSMITTER) Stephanie, Change every 3 months, Disp: 1 each, Rfl: 3    buPROPion (WELLBUTRIN SR) 200 MG SR12, TAKE ONE TABLET BY MOUTH 2 TIMES A DAY, Disp: 180 tablet, Rfl: 3    clindamycin (CLEOCIN) 300 MG capsule, TAKE ONE CAPSULE BY MOUTH EVERY EIGHT HOURS, Disp: 30 capsule, Rfl: 0    diabetic supplies, miscellan. Kit, Please change sensor every 14 days. [employer]Style Kayleigh Sensor 30-day supply (2 sensors/month), Disp: 2 kit, Rfl: 11    fenofibrate (TRICOR) 145 MG tablet, TAKE 1 TABLET BY MOUTH ONCE DAILY, Disp: 90 tablet, Rfl: 3    hydroCHLOROthiazide (HYDRODIURIL) 25 MG tablet, TAKE 1 TABLET BY MOUTH ONCE DAILY, Disp: 90 tablet, Rfl: 3    HYDROcodone-acetaminophen (NORCO) 5-325 mg per tablet, Take 1 tablet by mouth every 6 (six) hours as needed for Pain., Disp: 10 tablet, Rfl: 0    losartan (COZAAR) 100 MG tablet, TAKE 1 TABLET BY MOUTH ONCE DAILY, Disp: 90 tablet, Rfl: 3    metFORMIN (GLUCOPHAGE-XR) 500 MG ER 24hr tablet, TAKE TWO TABLETS BY MOUTH TWICE DAILY WITH MEALS, Disp: 120 tablet, Rfl: 0    multivitamin (THERAGRAN) per tablet, Take 1 tablet by mouth once daily., Disp: , Rfl:     omeprazole (PRILOSEC) 40 MG capsule, TAKE 1 CAPSULE BY MOUTH ONCE DAILY, Disp: 90 capsule, Rfl: 3    ONETOUCH DELICA LANCETS 33 gauge Misc, , Disp: , Rfl:     pen needle, diabetic (BD TENA 2ND GEN PEN NEEDLE) 32 gauge x 5/32" Ndle, USE ONE NEEDLE FOR INJECTION FOUR TIMES DAILY, Disp: 400 each, Rfl: 3    pravastatin (PRAVACHOL) 40 MG tablet, TAKE 1 TABLET BY MOUTH ONCE " "DAILY, Disp: 90 tablet, Rfl: 3    sildenafiL (VIAGRA) 100 MG tablet, Take 1 tablet (100 mg total) by mouth daily as needed for Erectile Dysfunction., Disp: 18 tablet, Rfl: 0    tirzepatide 10 mg/0.5 mL PnIj, Inject 10 mg into the skin every 7 days., Disp: 4 pen , Rfl: 0    vitamin D (VITAMIN D3) 1000 units Tab, Take 1,000 Units by mouth once daily., Disp: , Rfl:     hydrALAZINE (APRESOLINE) 25 MG tablet, Take 1 tablet (25 mg total) by mouth 3 (three) times daily. (Patient taking differently: Take 25 mg by mouth every 12 (twelve) hours.), Disp: 90 tablet, Rfl: 11    insulin degludec (TRESIBA FLEXTOUCH U-200) 200 unit/mL (3 mL) insulin pen, INJECT 70 UNITS SUBCUTANEOUSLY 2 TIMES A DAY, Disp: 10 pen , Rfl: 1    insulin lispro (HUMALOG KWIKPEN INSULIN) 100 unit/mL pen, INJECT 20 UNITS INTO THE SKIN 3 (THREE) TIMES DAILY WITH MEALS., Disp: 30 mL, Rfl: 1    loratadine (CLARITIN ORAL), Take by mouth as needed. Allergies, Disp: , Rfl:     Review of Systems:  Constitutional: no fever or chills  Eyes: no visual changes  ENT: no nasal congestion or sore throat  Respiratory: no cough or shortness of breath  Cardiovascular: no chest pain or palpitations  Gastrointestinal: no nausea or vomiting, tolerating diet  Genitourinary: no hematuria or dysuria  Integument/Breast: no rash or pruritis  Hematologic/Lymphatic: no easy bruising or lymphadenopathy  Musculoskeletal: positive for knee pain  Neurological: no seizures or tremors  Behavioral/Psych: no auditory or visual hallucinations  Endocrine: no heat or cold intolerance    PE:  Ht 6' 3.39" (1.915 m)   Wt 121.2 kg (267 lb 4.9 oz)   BMI 33.06 kg/m²   General: Pleasant, cooperative, NAD   Gait: antalgic  HEENT: NCAT, sclera nonicteric   Lungs: Respirations clear bilaterally; equal and unlabored.   CV: S1S2; 2+ bilateral upper and lower extremity pulses.   Skin: Intact throughout with no rashes, erythema, or lesions  Extremities: No LE edema,  no erythema or warmth of the skin in " either lower extremity.    Right knee exam:  Knee Range of Motion:normal, pain with passive range of motion  Effusion:none  Condition of skin:intact  Location of tenderness:Medial joint line   Strength:normal  Stability: stable to testing    Hip Examination: painless PROM of hip     Radiographs: Radiographs reveal advanced degenerative changes including subchondral cyst formation, subchondral sclerosis, osteophyte formation, joint space narrowing.     Knee Alignment: normal    Diagnosis: Primary osteoarthritis Right knee    Plan: Right total knee arthroplasty    Due to the serious nature of total joint infection and the high prevalence of community acquired MRSA, vancomycin will be used perioperatively.

## 2024-04-01 NOTE — PROGRESS NOTES
Lopez Hicks is a 53 y.o. year old here today for pre surgery optimization visit  in preparation for a Right total knee arthroplasty to be performed by Dr. Carranza  on 4/22/24.  he was last seen and treated in the clinic on 3/22/2024. he will be medically optimized by the pre op center. There has been no significant change in medical status since last visit. No fever, chills, malaise, or unexplained weight change.      Allergies, Medications, past medical and surgical history reviewed.    Focused examination performed.    Patient saw surgeon in clinic today. All questions answered. Patient encouraged to call with questions. Contact information given.     Pre, ivett, and post operative procedures and expectations discussed. Goals of successful surgery reviewed and include manageable pain levels, surgical site free of infection, medication management, and ambulation with PT/OT assistance. Healthy weight management discussed with patient and caregiver who were receptive to eduction of healthy diet and activity. No other necessary lifestyle changes identified. Educated patient about signs and symptoms of infection, medication management, anticoagulation therapy, risk of tobacco and alcohol use, and self-care to promote healing. Surgical guide given for future reference. Hibiclens given to patient with instructions. All questions were answered.     Lopez Hicks verbalized an understanding to the education and goals. Patient has displayed readiness to engage in care and is ready to proceed with surgery.  Patient reports his wife is able and ready to provide assistance at home after discharge.    Surgical and blood consents signed.    DME will be arranged. The mobility limitation cannot be sufficiently resolved by the use of a cane. Patient's functional mobility deficit can be sufficiently resolved with the use of a (Rolling Walker or Walker). Patient's mobility limitation significantly impairs their ability  "to participate in one of more activities of daily living. The use of a (Rolling Walker or Walker) will significantly improve the patient's ability to participate in MRADLS and the patient will use it on regular basis in the home."     Lopez Hicks will contact us if there are any questions, concerns, or changes in medical status prior to surgery.       Joint class: completed    Patient has discussed discharge planning with surgeon. Patient will be discharged to home following surgery.   patient will be scheduled with non-[facility] PT. PT will be done at the [facility] location on [address].    30 minutes of time was spent on patient education, review of records, templating, H&P, , appointment scheduling and optimizing patient for surgery.      "

## 2024-04-02 ENCOUNTER — OFFICE VISIT (OUTPATIENT)
Dept: ENDOCRINOLOGY | Facility: CLINIC | Age: 54
End: 2024-04-02
Payer: COMMERCIAL

## 2024-04-02 VITALS
DIASTOLIC BLOOD PRESSURE: 70 MMHG | SYSTOLIC BLOOD PRESSURE: 144 MMHG | TEMPERATURE: 99 F | WEIGHT: 269 LBS | BODY MASS INDEX: 32.74 KG/M2 | HEART RATE: 75 BPM

## 2024-04-02 DIAGNOSIS — E78.1 HYPERTRIGLYCERIDEMIA: ICD-10-CM

## 2024-04-02 DIAGNOSIS — E11.42 TYPE 2 DIABETES MELLITUS WITH DIABETIC POLYNEUROPATHY, WITH LONG-TERM CURRENT USE OF INSULIN: Primary | ICD-10-CM

## 2024-04-02 DIAGNOSIS — Z79.4 TYPE 2 DIABETES MELLITUS WITH DIABETIC POLYNEUROPATHY, WITH LONG-TERM CURRENT USE OF INSULIN: Primary | ICD-10-CM

## 2024-04-02 PROCEDURE — 99999 PR PBB SHADOW E&M-EST. PATIENT-LVL V: CPT | Mod: PBBFAC,,, | Performed by: NURSE PRACTITIONER

## 2024-04-02 PROCEDURE — 99215 OFFICE O/P EST HI 40 MIN: CPT | Mod: S$GLB,,, | Performed by: NURSE PRACTITIONER

## 2024-04-02 PROCEDURE — 95251 CONT GLUC MNTR ANALYSIS I&R: CPT | Mod: S$GLB,,, | Performed by: NURSE PRACTITIONER

## 2024-04-02 RX ORDER — EMPAGLIFLOZIN, METFORMIN HYDROCHLORIDE 12.5; 1 MG/1; MG/1
TABLET, EXTENDED RELEASE ORAL
Qty: 60 TABLET | Refills: 5 | Status: SHIPPED | OUTPATIENT
Start: 2024-04-02

## 2024-04-02 RX ORDER — INSULIN LISPRO 100 [IU]/ML
20 INJECTION, SOLUTION INTRAVENOUS; SUBCUTANEOUS
Qty: 30 ML | Refills: 5 | Status: SHIPPED | OUTPATIENT
Start: 2024-04-02 | End: 2024-04-02

## 2024-04-02 RX ORDER — BLOOD-GLUCOSE SENSOR
EACH MISCELLANEOUS
Qty: 9 EACH | Refills: 3 | Status: SHIPPED | OUTPATIENT
Start: 2024-04-02

## 2024-04-02 RX ORDER — INSULIN DEGLUDEC 200 U/ML
30 INJECTION, SOLUTION SUBCUTANEOUS DAILY
Qty: 3 PEN | Refills: 5 | Status: SHIPPED | OUTPATIENT
Start: 2024-04-02

## 2024-04-02 RX ORDER — INSULIN LISPRO 100 [IU]/ML
20 INJECTION, SOLUTION INTRAVENOUS; SUBCUTANEOUS
Qty: 30 ML | Refills: 5 | Status: SHIPPED | OUTPATIENT
Start: 2024-04-02

## 2024-04-02 NOTE — PATIENT INSTRUCTIONS
Start Synjardy (combination Jardiance and metformin) 1 tablet twice daily (equals same dose of metformin).     If unable to get Synjardy approved, contact office so that Jardiance prescription can be sent.     Finish out Mounjaro 7.5 mg then increase Mounjaro back to 10 mg once weekly.     Reduce Tresiba to 30 units once daily - starting tomorrow morning.   Continue Humalog at 20 units before each meal.     Prescription for Humalog sent to [facility]. If unable to get Humalog filled, then you can use Novolin R over the counter insulin from [employer], without a prescription.     If glucoses are consistently less than 80 overnight, then reduce Tresiba from 30 to 24 units once daily.     Continue pioglitazone.     Return to clinic in 3 months with labs prior. Virtual visit.

## 2024-04-02 NOTE — PROGRESS NOTES
CC: This 53 y.o. White male  is here for evaluation of  T2DM along with comorbidities indicated in the Visit Diagnosis section of this encounter.    HPI: Lopez Hicks was diagnosed with T2DM in early 20s, diagnosed upon routine labs.         Prior visit 8/2023  Pt has not been seen for several months. Has h/o poor f/u.   A1c is down from 8.8 to 6.7%. He has lost 24 lb since lov.   He cut down Tresiba dose from 70 units bid to now 20 units bid.   Skips Humalog often ac especially when more physically active. Averages Humalog 20 units BID in the morning without eating and again before supper. Usually skips before lunch. Previously was taking Humalog 46 units.   Plan A1c much lower than expected compared to Dexcom readings. Expect true A1c would be closer to mid 7's.   Increase Mounjaro to 10 mg once weekly.   Continue Humalog at 20 units but make sure to take before lunch and before supper.   Do not recommend taking Humalog in the morning unless eating a meal.   If glucoses start dropping a few hours after Humalog consistently, then reduce from 20 to 16 units.   Continue Tresiba at same dose of 40 units per day -ok to inject 40 units ONCE  daily; start tomorrow night.   Continue metformin and pioglitazone.   Maintain healthy eating habits.   Return to clinic in 3 months with labs prior. - will discuss resuming Jardiance at next visit.       Interval hx  Last seen several months ago. Has long h/o poor f/u. Pt cites difficulty with keeping his appts d/t heavy workload.   A1c is up from 6.7% in July to 7.1% in February.   90 day CGM average is 185.     Pt increased Mounjaro to 10 mg after lov. It's helped cut his appetite. He stopped taking Tresiba because he felt his BGs were controlled. However, review of CGM data earlier this year showed CGM average around 180.     Pt was  been given Mounjaro 7.5 mg the last 2 times from pharmacy. Has 2 pens left.   Pt increased Humalog dose from 20 to 40 units ac when  Mounjaro was lowered.  Then he ran out of Humalog last week and now cannot get it refilled till April 25th.  Since he has no Humalog, he just resumed Tresiba.     He took Tresiba 40 units in the AM and later 20 units. Yesterday he took 40  units bid.       LAST DIABETES EDUCATION:     HOSPITALIZED FOR DIABETES -  No.     DIABETES MEDICATIONS:   Tresiba  40 units   Humalog 20 units ac   Pioglitazone 15 mg once daily   metformin XR 1000 mg bid  Mounjaro 10 mg weekly     Misses medication doses - Humalog as above.     Changes needle once a day.   Injecting to abdomen.     DM COMPLICATIONS: peripheral neuropathy    SIGNIFICANT DIABETES MED HISTORY:   Trulicity 0.75 mg - nausea and bloating   Ozempic - acid reflux   Pioglitaonze 15 mg - edema to BLE     SELF MONITORING BLOOD GLUCOSE: monitors with Dexcom G6 CGM. See Media for CGM report.     CGM interpretation: uncontrolled glucoses d/t lack of prandial insulin in the last week. No hypoglycemia. During the 1st half of the report, when he was taking Humalog 40 units ac but no Tresiba, fasting BGs were in the high 100s indicating pt would benefit from basal insulin.                  HYPOGLYCEMIC EPISODES: rare      CURRENT DIET: drinks unsweet tea and lemonade with AS. No soft drinks. Eats 2-3 meals/day, sometimes skips breakfast.  Snacking less, smaller portions.         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 Daughter is vegan.       BP (!) 144/70   Pulse 75   Temp 98.9 °F (37.2 °C)   Wt 122 kg (269 lb)   BMI 32.74 kg/m²       ROS:   CONSTITUTIONAL: Appetite good, denies fatigue  Denies constipation, or diarrhea. + mild infrequent nausea, tolerable         PHYSICAL EXAM:  GENERAL: Well developed, well nourished. No acute distress.   PSYCH: AAOx3, appropriate mood and affect, conversant, well-groomed. Judgement and  insight good.   NEURO: Cranial nerves grossly intact. Speech clear, no tremor.   CHEST: Respirations even and unlabored.         Hemoglobin A1C   Date Value Ref Range Status   02/21/2024 7.1 (H) 4.0 - 5.6 % Final     Comment:     ADA Screening Guidelines:  5.7-6.4%  Consistent with prediabetes  >or=6.5%  Consistent with diabetes    High levels of fetal hemoglobin interfere with the HbA1C  assay. Heterozygous hemoglobin variants (HbS, HgC, etc)do  not significantly interfere with this assay.   However, presence of multiple variants may affect accuracy.     11/22/2023 7.0 (H) 4.0 - 5.6 % Final     Comment:     ADA Screening Guidelines:  5.7-6.4%  Consistent with prediabetes  >or=6.5%  Consistent with diabetes    High levels of fetal hemoglobin interfere with the HbA1C  assay. Heterozygous hemoglobin variants (HbS, HgC, etc)do  not significantly interfere with this assay.   However, presence of multiple variants may affect accuracy.     07/29/2023 6.7 (H) 4.0 - 5.6 % Final     Comment:     ADA Screening Guidelines:  5.7-6.4%  Consistent with prediabetes  >or=6.5%  Consistent with diabetes    High levels of fetal hemoglobin interfere with the HbA1C  assay. Heterozygous hemoglobin variants (HbS, HgC, etc)do  not significantly interfere with this assay.   However, presence of multiple variants may affect accuracy.           Component Value Date/Time     03/22/2024 1130    K 3.9 03/22/2024 1130     03/22/2024 1130    CO2 28 03/22/2024 1130    BUN 16 03/22/2024 1130    CREATININE 1.2 03/22/2024 1130     (H) 03/22/2024 1130    CALCIUM 10.3 03/22/2024 1130    ALKPHOS 54 (L) 03/22/2024 1130    AST 20 03/22/2024 1130    ALT 25 03/22/2024 1130    BILITOT 0.2 03/22/2024 1130    EGFRNORACEVR >60.0 03/22/2024 1130    ESTGFRAFRICA >60.0 04/08/2022 0723           Lab Results   Component Value Date    CHOL 148 11/22/2023    CHOL 141 11/14/2022    CHOL 157 07/06/2022     Lab Results   Component Value Date    HDL 35  (L) 11/22/2023    HDL 34 (L) 11/14/2022    HDL 32 (L) 07/06/2022     Lab Results   Component Value Date    LDLCALC 75.0 11/22/2023    LDLCALC 42.6 (L) 11/14/2022    LDLCALC Invalid, Trig>400.0 07/06/2022     Lab Results   Component Value Date    TRIG 190 (H) 11/22/2023    TRIG 322 (H) 11/14/2022    TRIG 420 (H) 07/06/2022       Lab Results   Component Value Date    CHOLHDL 23.6 11/22/2023    CHOLHDL 24.1 11/14/2022    CHOLHDL 20.4 07/06/2022         Lab Results   Component Value Date    MICALBCREAT 29.4 11/22/2023         ASSESSMENT and PLAN:    A1C GOAL: < 7 %     1. Type 2 diabetes mellitus with diabetic polyneuropathy, with long-term current use of insulin  A1c much lower than expected compared to Dexcom readings. Expect true A1c would be closer to mid 8's.       Start Synjardy (combination Jardiance and metformin) 1 tablet twice daily (equals same dose of metformin).     If unable to get Synjardy approved, contact office so that Jardiance prescription can be sent.     Finish out Mounjaro 7.5 mg then increase Mounjaro back to 10 mg once weekly.     Reduce Tresiba to 30 units once daily - starting tomorrow morning.   Continue Humalog at 20 units before each meal.     Prescription for Humalog sent to [facility]. If unable to get Humalog filled, then you can use Novolin R over the counter insulin from [employer], without a prescription.     If glucoses are consistently less than 80 overnight, then reduce Tresiba from 30 to 24 units once daily.     Continue pioglitazone.     Return to clinic in 3 months with labs prior. Virtual visit.        insulin lispro (HUMALOG KWIKPEN INSULIN) 100 unit/mL pen    Hemoglobin A1C    insulin degludec (TRESIBA FLEXTOUCH U-200) 200 unit/mL (3 mL) insulin pen    tirzepatide 10 mg/0.5 mL PnIj    DISCONTINUED: insulin lispro (HUMALOG KWIKPEN INSULIN) 100 unit/mL pen    DISCONTINUED: tirzepatide 10 mg/0.5 mL PnIj      2. Hypertriglyceridemia  Improved           Orders Placed  This Encounter   Procedures    Hemoglobin A1C     Standing Status:   Future     Standing Expiration Date:   6/1/2025        Follow up in about 3 months (around 7/2/2024).

## 2024-04-04 ENCOUNTER — PATIENT MESSAGE (OUTPATIENT)
Dept: ORTHOPEDICS | Facility: CLINIC | Age: 54
End: 2024-04-04
Payer: COMMERCIAL

## 2024-04-11 ENCOUNTER — PATIENT MESSAGE (OUTPATIENT)
Dept: ADMINISTRATIVE | Facility: OTHER | Age: 54
End: 2024-04-11
Payer: COMMERCIAL

## 2024-04-13 ENCOUNTER — PATIENT MESSAGE (OUTPATIENT)
Dept: ORTHOPEDICS | Facility: CLINIC | Age: 54
End: 2024-04-13
Payer: COMMERCIAL

## 2024-04-16 ENCOUNTER — PATIENT MESSAGE (OUTPATIENT)
Dept: ADMINISTRATIVE | Facility: OTHER | Age: 54
End: 2024-04-16
Payer: COMMERCIAL

## 2024-04-17 ENCOUNTER — PATIENT MESSAGE (OUTPATIENT)
Dept: ORTHOPEDICS | Facility: CLINIC | Age: 54
End: 2024-04-17
Payer: COMMERCIAL

## 2024-04-19 ENCOUNTER — TELEPHONE (OUTPATIENT)
Dept: ORTHOPEDICS | Facility: CLINIC | Age: 54
End: 2024-04-19
Payer: COMMERCIAL

## 2024-04-19 ENCOUNTER — ANESTHESIA EVENT (OUTPATIENT)
Dept: SURGERY | Facility: HOSPITAL | Age: 54
End: 2024-04-19
Payer: COMMERCIAL

## 2024-04-19 DIAGNOSIS — M17.11 PRIMARY OSTEOARTHRITIS OF RIGHT KNEE: Primary | ICD-10-CM

## 2024-04-19 DIAGNOSIS — Z96.651 STATUS POST RIGHT KNEE REPLACEMENT: ICD-10-CM

## 2024-04-19 RX ORDER — CELECOXIB 200 MG/1
200 CAPSULE ORAL DAILY
Qty: 30 CAPSULE | Refills: 0 | Status: SHIPPED | OUTPATIENT
Start: 2024-04-19 | End: 2024-05-13 | Stop reason: SDUPTHER

## 2024-04-19 RX ORDER — PANTOPRAZOLE SODIUM 40 MG/1
40 TABLET, DELAYED RELEASE ORAL DAILY
Qty: 30 TABLET | Refills: 0 | Status: SHIPPED | OUTPATIENT
Start: 2024-04-19 | End: 2024-05-13 | Stop reason: SDUPTHER

## 2024-04-19 RX ORDER — ASPIRIN 81 MG/1
81 TABLET ORAL 2 TIMES DAILY
Qty: 60 TABLET | Refills: 0 | Status: SHIPPED | OUTPATIENT
Start: 2024-04-19 | End: 2024-05-13 | Stop reason: SDUPTHER

## 2024-04-19 RX ORDER — METHOCARBAMOL 750 MG/1
750 TABLET, FILM COATED ORAL 4 TIMES DAILY PRN
Qty: 40 TABLET | Refills: 0 | Status: SHIPPED | OUTPATIENT
Start: 2024-04-19 | End: 2024-05-02

## 2024-04-19 RX ORDER — CEFADROXIL 500 MG/1
500 CAPSULE ORAL EVERY 12 HOURS
Qty: 14 CAPSULE | Refills: 0 | Status: SHIPPED | OUTPATIENT
Start: 2024-04-19

## 2024-04-19 RX ORDER — OXYCODONE HYDROCHLORIDE 5 MG/1
TABLET ORAL
Qty: 50 TABLET | Refills: 0 | Status: SHIPPED | OUTPATIENT
Start: 2024-04-19 | End: 2024-04-27 | Stop reason: SDUPTHER

## 2024-04-19 RX ORDER — DEXTROMETHORPHAN HYDROBROMIDE, GUAIFENESIN 5; 100 MG/5ML; MG/5ML
650 LIQUID ORAL EVERY 8 HOURS
Qty: 120 TABLET | Refills: 0 | Status: SHIPPED | OUTPATIENT
Start: 2024-04-19

## 2024-04-19 RX ORDER — AMOXICILLIN 250 MG
1 CAPSULE ORAL DAILY
Qty: 30 TABLET | Refills: 0 | Status: SHIPPED | OUTPATIENT
Start: 2024-04-19

## 2024-04-19 NOTE — TELEPHONE ENCOUNTER
----- Message from Anya Lozano sent at 4/19/2024  1:58 PM CDT -----  Regarding: post op therapy referral needed  Contact: Beatriz @ [phone]  Beatriz is calling from [employer] advising that a post op therapy referral is needed on pt's behalf. Please call to advise further. Thank you for all you are doing. Fax [phone]

## 2024-04-19 NOTE — TELEPHONE ENCOUNTER
I called the patient today regarding surgery on 4/22/2024 with Dr. Jt Carranza. I informed the patient that his surgery will be at  [facility] ([facility] [place] [address]). I informed the patient they must arrive at 6:00am  and their surgery will start at 8:00am.     Per the [facility] COVID-19 Pre-Procedural Testing Policy (administered 10/26/2022), patients do not need tested for COVID-19 regardless of vaccination status.    I reminded the patient that they cannot eat or drink after midnight, the night before surgery.      I reminded the patient to be careful of their skin over the next few days to make sure they do not get any cuts, scratches or scrapes.    The patient has not yet received their walker, bedside commode or shower chair from [facility]. I told the patient that she needs to call [facility] today at [phone].    The patient verbalized understanding and has no further questions.

## 2024-04-22 ENCOUNTER — HOSPITAL ENCOUNTER (OUTPATIENT)
Facility: HOSPITAL | Age: 54
Discharge: HOME | End: 2024-04-22
Attending: ORTHOPAEDIC SURGERY | Admitting: ORTHOPAEDIC SURGERY
Payer: COMMERCIAL

## 2024-04-22 ENCOUNTER — ANESTHESIA (OUTPATIENT)
Dept: SURGERY | Facility: HOSPITAL | Age: 54
End: 2024-04-22
Payer: COMMERCIAL

## 2024-04-22 VITALS
RESPIRATION RATE: 20 BRPM | TEMPERATURE: 98 F | SYSTOLIC BLOOD PRESSURE: 126 MMHG | BODY MASS INDEX: 32.76 KG/M2 | HEART RATE: 68 BPM | DIASTOLIC BLOOD PRESSURE: 63 MMHG | WEIGHT: 269 LBS | OXYGEN SATURATION: 92 % | HEIGHT: 76 IN

## 2024-04-22 DIAGNOSIS — M17.11 PRIMARY OSTEOARTHRITIS OF RIGHT KNEE: ICD-10-CM

## 2024-04-22 DIAGNOSIS — Z96.651 STATUS POST RIGHT KNEE REPLACEMENT: ICD-10-CM

## 2024-04-22 DIAGNOSIS — M17.11 PRIMARY OSTEOARTHRITIS OF RIGHT KNEE: Primary | ICD-10-CM

## 2024-04-22 LAB
POCT GLUCOSE: 157 MG/DL (ref 70–110)
POCT GLUCOSE: 250 MG/DL (ref 70–110)

## 2024-04-22 PROCEDURE — C1776 JOINT DEVICE (IMPLANTABLE): HCPCS | Performed by: ORTHOPAEDIC SURGERY

## 2024-04-22 PROCEDURE — 27447 TOTAL KNEE ARTHROPLASTY: CPT | Mod: RT,,, | Performed by: ORTHOPAEDIC SURGERY

## 2024-04-22 PROCEDURE — 97165 OT EVAL LOW COMPLEX 30 MIN: CPT

## 2024-04-22 PROCEDURE — 25000003 PHARM REV CODE 250: Performed by: NURSE ANESTHETIST, CERTIFIED REGISTERED

## 2024-04-22 PROCEDURE — 71000039 HC RECOVERY, EACH ADD'L HOUR: Performed by: ORTHOPAEDIC SURGERY

## 2024-04-22 PROCEDURE — 63600175 PHARM REV CODE 636 W HCPCS: Performed by: NURSE ANESTHETIST, CERTIFIED REGISTERED

## 2024-04-22 PROCEDURE — 82962 GLUCOSE BLOOD TEST: CPT | Performed by: ORTHOPAEDIC SURGERY

## 2024-04-22 PROCEDURE — 63600175 PHARM REV CODE 636 W HCPCS: Performed by: ORTHOPAEDIC SURGERY

## 2024-04-22 PROCEDURE — 20985 CPTR-ASST DIR MS PX: CPT | Mod: ,,, | Performed by: ORTHOPAEDIC SURGERY

## 2024-04-22 PROCEDURE — 36000713 HC OR TIME LEV V EA ADD 15 MIN: Performed by: ORTHOPAEDIC SURGERY

## 2024-04-22 PROCEDURE — D9220A PRA ANESTHESIA: Mod: ANES,,, | Performed by: ANESTHESIOLOGY

## 2024-04-22 PROCEDURE — 71000033 HC RECOVERY, INTIAL HOUR: Performed by: ORTHOPAEDIC SURGERY

## 2024-04-22 PROCEDURE — 37000009 HC ANESTHESIA EA ADD 15 MINS: Performed by: ORTHOPAEDIC SURGERY

## 2024-04-22 PROCEDURE — 97116 GAIT TRAINING THERAPY: CPT

## 2024-04-22 PROCEDURE — 71000016 HC POSTOP RECOV ADDL HR: Performed by: ORTHOPAEDIC SURGERY

## 2024-04-22 PROCEDURE — 25000003 PHARM REV CODE 250: Performed by: ANESTHESIOLOGY

## 2024-04-22 PROCEDURE — 25000003 PHARM REV CODE 250: Performed by: NURSE PRACTITIONER

## 2024-04-22 PROCEDURE — 63600175 PHARM REV CODE 636 W HCPCS: Performed by: ANESTHESIOLOGY

## 2024-04-22 PROCEDURE — 63600175 PHARM REV CODE 636 W HCPCS: Performed by: NURSE PRACTITIONER

## 2024-04-22 PROCEDURE — 25000003 PHARM REV CODE 250: Performed by: ORTHOPAEDIC SURGERY

## 2024-04-22 PROCEDURE — 71000015 HC POSTOP RECOV 1ST HR: Performed by: ORTHOPAEDIC SURGERY

## 2024-04-22 PROCEDURE — C1713 ANCHOR/SCREW BN/BN,TIS/BN: HCPCS | Performed by: ORTHOPAEDIC SURGERY

## 2024-04-22 PROCEDURE — 27100025 HC TUBING, SET FLUID WARMER: Performed by: ANESTHESIOLOGY

## 2024-04-22 PROCEDURE — 97161 PT EVAL LOW COMPLEX 20 MIN: CPT

## 2024-04-22 PROCEDURE — 27201423 OPTIME MED/SURG SUP & DEVICES STERILE SUPPLY: Performed by: ORTHOPAEDIC SURGERY

## 2024-04-22 PROCEDURE — 97530 THERAPEUTIC ACTIVITIES: CPT

## 2024-04-22 PROCEDURE — 94761 N-INVAS EAR/PLS OXIMETRY MLT: CPT

## 2024-04-22 PROCEDURE — D9220A PRA ANESTHESIA: Mod: CRNA,,, | Performed by: NURSE ANESTHETIST, CERTIFIED REGISTERED

## 2024-04-22 PROCEDURE — 36000712 HC OR TIME LEV V 1ST 15 MIN: Performed by: ORTHOPAEDIC SURGERY

## 2024-04-22 PROCEDURE — 97535 SELF CARE MNGMENT TRAINING: CPT

## 2024-04-22 PROCEDURE — 99900035 HC TECH TIME PER 15 MIN (STAT)

## 2024-04-22 PROCEDURE — 37000008 HC ANESTHESIA 1ST 15 MINUTES: Performed by: ORTHOPAEDIC SURGERY

## 2024-04-22 DEVICE — COMP FEM POS ATTUNE SZ7 R: Type: IMPLANTABLE DEVICE | Site: KNEE | Status: FUNCTIONAL

## 2024-04-22 DEVICE — CEMENT BONE SURG SMPLX P RADPQ: Type: IMPLANTABLE DEVICE | Site: KNEE | Status: FUNCTIONAL

## 2024-04-22 DEVICE — PATELLA ATTUNE MEDLZD DOME 41: Type: IMPLANTABLE DEVICE | Site: KNEE | Status: FUNCTIONAL

## 2024-04-22 RX ORDER — SODIUM CHLORIDE 0.9 % (FLUSH) 0.9 %
10 SYRINGE (ML) INJECTION
Status: DISCONTINUED | OUTPATIENT
Start: 2024-04-22 | End: 2024-04-22 | Stop reason: HOSPADM

## 2024-04-22 RX ORDER — ACETAMINOPHEN 500 MG
1000 TABLET ORAL EVERY 6 HOURS
Status: DISCONTINUED | OUTPATIENT
Start: 2024-04-22 | End: 2024-04-22 | Stop reason: HOSPADM

## 2024-04-22 RX ORDER — SODIUM CHLORIDE 9 MG/ML
INJECTION, SOLUTION INTRAVENOUS CONTINUOUS
Status: DISCONTINUED | OUTPATIENT
Start: 2024-04-22 | End: 2024-04-22 | Stop reason: HOSPADM

## 2024-04-22 RX ORDER — OXYCODONE HYDROCHLORIDE 5 MG/1
5 TABLET ORAL
Status: DISCONTINUED | OUTPATIENT
Start: 2024-04-22 | End: 2024-04-22 | Stop reason: HOSPADM

## 2024-04-22 RX ORDER — NICARDIPINE HYDROCHLORIDE 2.5 MG/ML
INJECTION INTRAVENOUS
Status: DISCONTINUED | OUTPATIENT
Start: 2024-04-22 | End: 2024-04-22

## 2024-04-22 RX ORDER — LIDOCAINE HYDROCHLORIDE 20 MG/ML
INJECTION INTRAVENOUS
Status: DISCONTINUED | OUTPATIENT
Start: 2024-04-22 | End: 2024-04-22

## 2024-04-22 RX ORDER — MIDAZOLAM HYDROCHLORIDE 1 MG/ML
4 INJECTION, SOLUTION INTRAMUSCULAR; INTRAVENOUS
Status: DISCONTINUED | OUTPATIENT
Start: 2024-04-22 | End: 2024-04-22 | Stop reason: HOSPADM

## 2024-04-22 RX ORDER — VANCOMYCIN HYDROCHLORIDE 1 G/20ML
INJECTION, POWDER, LYOPHILIZED, FOR SOLUTION INTRAVENOUS
Status: DISCONTINUED | OUTPATIENT
Start: 2024-04-22 | End: 2024-04-22 | Stop reason: HOSPADM

## 2024-04-22 RX ORDER — ONDANSETRON HYDROCHLORIDE 2 MG/ML
INJECTION, SOLUTION INTRAVENOUS
Status: DISCONTINUED | OUTPATIENT
Start: 2024-04-22 | End: 2024-04-22

## 2024-04-22 RX ORDER — INSULIN ASPART 100 [IU]/ML
4 INJECTION, SOLUTION INTRAVENOUS; SUBCUTANEOUS ONCE
Status: COMPLETED | OUTPATIENT
Start: 2024-04-22 | End: 2024-04-22

## 2024-04-22 RX ORDER — PROCHLORPERAZINE EDISYLATE 5 MG/ML
5 INJECTION INTRAMUSCULAR; INTRAVENOUS EVERY 6 HOURS PRN
Status: DISCONTINUED | OUTPATIENT
Start: 2024-04-22 | End: 2024-04-22 | Stop reason: HOSPADM

## 2024-04-22 RX ORDER — METHOCARBAMOL 500 MG/1
1000 TABLET, FILM COATED ORAL ONCE
Status: COMPLETED | OUTPATIENT
Start: 2024-04-22 | End: 2024-04-22

## 2024-04-22 RX ORDER — ASPIRIN 81 MG/1
81 TABLET ORAL 2 TIMES DAILY
Status: DISCONTINUED | OUTPATIENT
Start: 2024-04-22 | End: 2024-04-22

## 2024-04-22 RX ORDER — PROPOFOL 10 MG/ML
VIAL (ML) INTRAVENOUS
Status: DISCONTINUED | OUTPATIENT
Start: 2024-04-22 | End: 2024-04-22

## 2024-04-22 RX ORDER — SODIUM CHLORIDE 9 MG/ML
INJECTION, SOLUTION INTRAVENOUS
Status: COMPLETED | OUTPATIENT
Start: 2024-04-22 | End: 2024-04-22

## 2024-04-22 RX ORDER — AMOXICILLIN 250 MG
1 CAPSULE ORAL 2 TIMES DAILY
Status: DISCONTINUED | OUTPATIENT
Start: 2024-04-22 | End: 2024-04-22 | Stop reason: HOSPADM

## 2024-04-22 RX ORDER — DEXAMETHASONE SODIUM PHOSPHATE 4 MG/ML
INJECTION, SOLUTION INTRA-ARTICULAR; INTRALESIONAL; INTRAMUSCULAR; INTRAVENOUS; SOFT TISSUE
Status: DISCONTINUED | OUTPATIENT
Start: 2024-04-22 | End: 2024-04-22

## 2024-04-22 RX ORDER — LABETALOL HYDROCHLORIDE 5 MG/ML
INJECTION, SOLUTION INTRAVENOUS
Status: DISCONTINUED | OUTPATIENT
Start: 2024-04-22 | End: 2024-04-22

## 2024-04-22 RX ORDER — ROPIVACAINE/EPI/CLONIDINE/KET 2.46-0.005
SYRINGE (ML) INJECTION
Status: DISCONTINUED | OUTPATIENT
Start: 2024-04-22 | End: 2024-04-22 | Stop reason: HOSPADM

## 2024-04-22 RX ORDER — ASPIRIN 81 MG/1
81 TABLET ORAL 2 TIMES DAILY
Status: DISCONTINUED | OUTPATIENT
Start: 2024-04-22 | End: 2024-04-22 | Stop reason: HOSPADM

## 2024-04-22 RX ORDER — METHOCARBAMOL 750 MG/1
750 TABLET, FILM COATED ORAL 3 TIMES DAILY
Status: DISCONTINUED | OUTPATIENT
Start: 2024-04-22 | End: 2024-04-22 | Stop reason: HOSPADM

## 2024-04-22 RX ORDER — BISACODYL 10 MG/1
10 SUPPOSITORY RECTAL EVERY 12 HOURS PRN
Status: DISCONTINUED | OUTPATIENT
Start: 2024-04-22 | End: 2024-04-22 | Stop reason: HOSPADM

## 2024-04-22 RX ORDER — SODIUM CHLORIDE 9 MG/ML
INJECTION, SOLUTION INTRAVENOUS CONTINUOUS PRN
Status: DISCONTINUED | OUTPATIENT
Start: 2024-04-22 | End: 2024-04-22

## 2024-04-22 RX ORDER — NALOXONE HCL 0.4 MG/ML
0.02 VIAL (ML) INJECTION
Status: DISCONTINUED | OUTPATIENT
Start: 2024-04-22 | End: 2024-04-22 | Stop reason: HOSPADM

## 2024-04-22 RX ORDER — HALOPERIDOL 5 MG/ML
0.5 INJECTION INTRAMUSCULAR EVERY 10 MIN PRN
Status: DISCONTINUED | OUTPATIENT
Start: 2024-04-22 | End: 2024-04-22 | Stop reason: HOSPADM

## 2024-04-22 RX ORDER — INSULIN ASPART 100 [IU]/ML
2 INJECTION, SOLUTION INTRAVENOUS; SUBCUTANEOUS ONCE
Status: DISCONTINUED | OUTPATIENT
Start: 2024-04-22 | End: 2024-04-22

## 2024-04-22 RX ORDER — PREGABALIN 75 MG/1
75 CAPSULE ORAL NIGHTLY
Status: DISCONTINUED | OUTPATIENT
Start: 2024-04-22 | End: 2024-04-22 | Stop reason: HOSPADM

## 2024-04-22 RX ORDER — MUPIROCIN 20 MG/G
1 OINTMENT TOPICAL
Status: COMPLETED | OUTPATIENT
Start: 2024-04-22 | End: 2024-04-22

## 2024-04-22 RX ORDER — FAMOTIDINE 10 MG/ML
INJECTION INTRAVENOUS
Status: DISCONTINUED | OUTPATIENT
Start: 2024-04-22 | End: 2024-04-22

## 2024-04-22 RX ORDER — PREGABALIN 75 MG/1
75 CAPSULE ORAL
Status: COMPLETED | OUTPATIENT
Start: 2024-04-22 | End: 2024-04-22

## 2024-04-22 RX ORDER — FENTANYL CITRATE 50 UG/ML
INJECTION, SOLUTION INTRAMUSCULAR; INTRAVENOUS
Status: DISCONTINUED | OUTPATIENT
Start: 2024-04-22 | End: 2024-04-22

## 2024-04-22 RX ORDER — FENTANYL CITRATE 50 UG/ML
25 INJECTION, SOLUTION INTRAMUSCULAR; INTRAVENOUS EVERY 5 MIN PRN
Status: COMPLETED | OUTPATIENT
Start: 2024-04-22 | End: 2024-04-22

## 2024-04-22 RX ORDER — ACETAMINOPHEN 500 MG
1000 TABLET ORAL
Status: COMPLETED | OUTPATIENT
Start: 2024-04-22 | End: 2024-04-22

## 2024-04-22 RX ORDER — OXYCODONE HYDROCHLORIDE 5 MG/1
10 TABLET ORAL
Status: DISCONTINUED | OUTPATIENT
Start: 2024-04-22 | End: 2024-04-22 | Stop reason: HOSPADM

## 2024-04-22 RX ORDER — KETAMINE HCL IN 0.9 % NACL 50 MG/5 ML
SYRINGE (ML) INTRAVENOUS
Status: DISCONTINUED | OUTPATIENT
Start: 2024-04-22 | End: 2024-04-22

## 2024-04-22 RX ORDER — SODIUM CHLORIDE 0.9 % (FLUSH) 0.9 %
3 SYRINGE (ML) INJECTION
Status: DISCONTINUED | OUTPATIENT
Start: 2024-04-22 | End: 2024-04-22 | Stop reason: HOSPADM

## 2024-04-22 RX ORDER — ONDANSETRON HYDROCHLORIDE 2 MG/ML
4 INJECTION, SOLUTION INTRAVENOUS EVERY 8 HOURS PRN
Status: DISCONTINUED | OUTPATIENT
Start: 2024-04-22 | End: 2024-04-22 | Stop reason: HOSPADM

## 2024-04-22 RX ORDER — BETHANECHOL CHLORIDE 10 MG/1
10 TABLET ORAL EVERY 30 MIN PRN
Status: DISCONTINUED | OUTPATIENT
Start: 2024-04-22 | End: 2024-04-22 | Stop reason: HOSPADM

## 2024-04-22 RX ORDER — POLYETHYLENE GLYCOL 3350 17 G/17G
17 POWDER, FOR SOLUTION ORAL DAILY
Status: DISCONTINUED | OUTPATIENT
Start: 2024-04-22 | End: 2024-04-22 | Stop reason: HOSPADM

## 2024-04-22 RX ORDER — FAMOTIDINE 20 MG/1
20 TABLET, FILM COATED ORAL 2 TIMES DAILY
Status: DISCONTINUED | OUTPATIENT
Start: 2024-04-22 | End: 2024-04-22 | Stop reason: HOSPADM

## 2024-04-22 RX ORDER — FENTANYL CITRATE 50 UG/ML
100 INJECTION, SOLUTION INTRAMUSCULAR; INTRAVENOUS
Status: DISCONTINUED | OUTPATIENT
Start: 2024-04-22 | End: 2024-04-22 | Stop reason: HOSPADM

## 2024-04-22 RX ORDER — LIDOCAINE HYDROCHLORIDE AND EPINEPHRINE 15; 5 MG/ML; UG/ML
INJECTION, SOLUTION EPIDURAL
Status: DISCONTINUED | OUTPATIENT
Start: 2024-04-22 | End: 2024-04-22

## 2024-04-22 RX ORDER — MIDAZOLAM HYDROCHLORIDE 1 MG/ML
INJECTION INTRAMUSCULAR; INTRAVENOUS
Status: DISCONTINUED | OUTPATIENT
Start: 2024-04-22 | End: 2024-04-22

## 2024-04-22 RX ORDER — METHOCARBAMOL 750 MG/1
750 TABLET, FILM COATED ORAL 3 TIMES DAILY
Status: DISCONTINUED | OUTPATIENT
Start: 2024-04-22 | End: 2024-04-22

## 2024-04-22 RX ORDER — CELECOXIB 200 MG/1
400 CAPSULE ORAL ONCE
Status: COMPLETED | OUTPATIENT
Start: 2024-04-22 | End: 2024-04-22

## 2024-04-22 RX ORDER — PROPOFOL 10 MG/ML
INJECTION, EMULSION INTRAVENOUS CONTINUOUS PRN
Status: DISCONTINUED | OUTPATIENT
Start: 2024-04-22 | End: 2024-04-22

## 2024-04-22 RX ORDER — LIDOCAINE HYDROCHLORIDE 10 MG/ML
1 INJECTION, SOLUTION EPIDURAL; INFILTRATION; INTRACAUDAL; PERINEURAL
Status: DISCONTINUED | OUTPATIENT
Start: 2024-04-22 | End: 2024-04-22 | Stop reason: HOSPADM

## 2024-04-22 RX ADMIN — PROPOFOL 30 MG: 10 INJECTION, EMULSION INTRAVENOUS at 08:04

## 2024-04-22 RX ADMIN — FAMOTIDINE 20 MG: 10 INJECTION, SOLUTION INTRAVENOUS at 08:04

## 2024-04-22 RX ADMIN — PROPOFOL 100 MCG/KG/MIN: 10 INJECTION, EMULSION INTRAVENOUS at 08:04

## 2024-04-22 RX ADMIN — LABETALOL HYDROCHLORIDE 10 MG: 5 INJECTION, SOLUTION INTRAVENOUS at 09:04

## 2024-04-22 RX ADMIN — CEFAZOLIN SODIUM 3 ML: 2 SOLUTION INTRAVENOUS at 08:04

## 2024-04-22 RX ADMIN — SODIUM CHLORIDE: 0.9 INJECTION, SOLUTION INTRAVENOUS at 06:04

## 2024-04-22 RX ADMIN — MIDAZOLAM HYDROCHLORIDE 2 MG: 1 INJECTION, SOLUTION INTRAMUSCULAR; INTRAVENOUS at 08:04

## 2024-04-22 RX ADMIN — METHOCARBAMOL 1000 MG: 500 TABLET ORAL at 12:04

## 2024-04-22 RX ADMIN — SODIUM CHLORIDE, SODIUM GLUCONATE, SODIUM ACETATE, POTASSIUM CHLORIDE, MAGNESIUM CHLORIDE, SODIUM PHOSPHATE, DIBASIC, AND POTASSIUM PHOSPHATE: .53; .5; .37; .037; .03; .012; .00082 INJECTION, SOLUTION INTRAVENOUS at 11:04

## 2024-04-22 RX ADMIN — Medication 30 MG: at 08:04

## 2024-04-22 RX ADMIN — CELECOXIB 400 MG: 200 CAPSULE ORAL at 06:04

## 2024-04-22 RX ADMIN — Medication 20 MG: at 09:04

## 2024-04-22 RX ADMIN — FENTANYL CITRATE 25 MCG: 50 INJECTION INTRAMUSCULAR; INTRAVENOUS at 02:04

## 2024-04-22 RX ADMIN — SODIUM CHLORIDE: 9 INJECTION, SOLUTION INTRAVENOUS at 06:04

## 2024-04-22 RX ADMIN — TRANEXAMIC ACID 1000 MG: 100 INJECTION INTRAVENOUS at 08:04

## 2024-04-22 RX ADMIN — FENTANYL CITRATE 25 MCG: 50 INJECTION INTRAMUSCULAR; INTRAVENOUS at 01:04

## 2024-04-22 RX ADMIN — OXYCODONE 10 MG: 5 TABLET ORAL at 02:04

## 2024-04-22 RX ADMIN — LABETALOL HYDROCHLORIDE 5 MG: 5 INJECTION, SOLUTION INTRAVENOUS at 09:04

## 2024-04-22 RX ADMIN — LIDOCAINE HYDROCHLORIDE,EPINEPHRINE BITARTRATE 5 ML: 15; .005 INJECTION, SOLUTION EPIDURAL; INFILTRATION; INTRACAUDAL; PERINEURAL at 10:04

## 2024-04-22 RX ADMIN — SODIUM CHLORIDE, SODIUM GLUCONATE, SODIUM ACETATE, POTASSIUM CHLORIDE, MAGNESIUM CHLORIDE, SODIUM PHOSPHATE, DIBASIC, AND POTASSIUM PHOSPHATE: .53; .5; .37; .037; .03; .012; .00082 INJECTION, SOLUTION INTRAVENOUS at 10:04

## 2024-04-22 RX ADMIN — DEXAMETHASONE SODIUM PHOSPHATE 8 MG: 4 INJECTION, SOLUTION INTRAMUSCULAR; INTRAVENOUS at 08:04

## 2024-04-22 RX ADMIN — NICARDIPINE HYDROCHLORIDE 0.2 MG: 25 INJECTION, SOLUTION INTRAVENOUS at 09:04

## 2024-04-22 RX ADMIN — VANCOMYCIN HYDROCHLORIDE 1500 MG: 1 INJECTION, POWDER, LYOPHILIZED, FOR SOLUTION INTRAVENOUS at 06:04

## 2024-04-22 RX ADMIN — OXYCODONE 5 MG: 5 TABLET ORAL at 12:04

## 2024-04-22 RX ADMIN — MUPIROCIN 1 G: 20 OINTMENT TOPICAL at 06:04

## 2024-04-22 RX ADMIN — VANCOMYCIN HYDROCHLORIDE 1500 MG: 1.5 INJECTION, POWDER, LYOPHILIZED, FOR SOLUTION INTRAVENOUS at 06:04

## 2024-04-22 RX ADMIN — ONDANSETRON 4 MG: 2 INJECTION INTRAMUSCULAR; INTRAVENOUS at 08:04

## 2024-04-22 RX ADMIN — ACETAMINOPHEN 1000 MG: 500 TABLET ORAL at 06:04

## 2024-04-22 RX ADMIN — PREGABALIN 75 MG: 75 CAPSULE ORAL at 06:04

## 2024-04-22 RX ADMIN — ACETAMINOPHEN 1000 MG: 500 TABLET ORAL at 12:04

## 2024-04-22 RX ADMIN — INSULIN ASPART 4 UNITS: 100 INJECTION, SOLUTION INTRAVENOUS; SUBCUTANEOUS at 12:04

## 2024-04-22 RX ADMIN — LIDOCAINE HYDROCHLORIDE 60 MG: 20 INJECTION INTRAVENOUS at 08:04

## 2024-04-22 RX ADMIN — FENTANYL CITRATE 25 MCG: 50 INJECTION, SOLUTION INTRAMUSCULAR; INTRAVENOUS at 10:04

## 2024-04-22 NOTE — PT/OT/SLP EVAL
"Occupational Therapy   Evaluation and Discharge Note    Name: Lopez Hicks  MRN: [id number]  Admitting Diagnosis: Primary osteoarthritis of right knee  Recent Surgery: Procedure(s) (LRB):  ARTHROPLASTY, KNEE, TOTAL, USING COMPUTER-ASSISTED NAVIGATION: VELYS: RIGHT: DEPUY - ATTUNE: SAME DAY (Right) Day of Surgery    Recommendations:     Discharge Recommendations: Low Intensity Therapy  Discharge Equipment Recommendations:  bath bench  Barriers to discharge:  None    Assessment:     Lopez Hicks is a 53 y.o. male with a medical diagnosis of Primary osteoarthritis of right knee.  He presents with R TKA. Performance deficits affecting function: impaired self care skills, impaired functional mobility, orthopedic precautions.  Pt was able to perform supine/sit T/F c S and  Sit <> Stand Transfer with modified independence with rolling walker Bed <> Chair Transfer using Stand Pivot technique with modified independence with rolling walker Toilet Transfer Stand Pivot technique with modified independence with rolling walker and bedside commode.  Able to perform UB/LB dressing c modified independence  Educated pt on bathing, car transfers, and cryotherapy.       Rehab Prognosis: Good; patient would benefit from acute skilled OT services to address these deficits and reach maximum level of function.       Plan:     Patient to be seen daily to address the above listed problems via self-care/home management, therapeutic activities, therapeutic exercises  Plan of Care Expires: 04/22/24  Plan of Care Reviewed with: patient, spouse    Subjective     Chief Complaint: R TKA  Patient/Family Comments/goals: "I feel good".    Occupational Profile:  Living Environment: Pt lives in a one story house c 3 [address] and has a raised toilet seat.  Has a walk-in shower.  Previous level of function: I PTA  Roles and Routines: Works for [employer] company.  Equipment Used at Home: none  Assistance upon Discharge: Pt lives c his " wife.    Pain/Comfort:  Pain Rating 1: 7/10    Patients cultural, spiritual, Mu-ism conflicts given the current situation: no    Objective:     Communicated with: RN prior to session.  Patient found supine with cryotherapy, telemetry, peripheral IV, blood pressure cuff upon OT entry to room.    General Precautions: Standard, fall  Orthopedic Precautions: RLE weight bearing as tolerated  Braces: N/A  Respiratory Status: Room air    Occupational Performance:    Bed Mobility:    Patient completed Supine to Sit with modified independence    Functional Mobility/Transfers:  Patient completed Sit <> Stand Transfer with modified independence  with  rolling walker   Patient completed Bed <> Chair Transfer using Stand Pivot technique with modified independence with rolling walker  Patient completed Toilet Transfer Stand Pivot technique with modified independence with  rolling walker and bedside commode  Functional Mobility: Pt was able to walk to bathroom c CGA and RW.    Activities of Daily Living:  Grooming: modified independence to wash hands while standing at sink c RW.  Upper Body Dressing: modified independence to don shirt.  Lower Body Dressing: modified independence to don underwear, shorts, and shoes.  Toileting: modified independence for toilet hygiene.      Physical Exam:  Upper Extremity Range of Motion:     -       Right Upper Extremity: WFL  -       Left Upper Extremity: WFL  Upper Extremity Strength:    -       Right Upper Extremity: WFL  -       Left Upper Extremity: WFL    AMPAC 6 Click ADL:  AMPAC Total Score: 24    Patient left up in chair with all lines intact, call button in reach, RN notified, and wife present    GOALS:   Multidisciplinary Problems       Occupational Therapy Goals          Problem: Occupational Therapy    Goal Priority Disciplines Outcome Interventions   Occupational Therapy Goal     OT, PT/OT Ongoing, Progressing    Description: Goals to be met by: 4/22/24     Patient will increase  functional independence with ADLs by performing:    UE Dressing with [place].  LE Dressing with [place].  Grooming while standing at sink with Modified [place].  Toileting from toilet with Modified [place] for hygiene and clothing management.   Bathing from  shower chair/bench with Modified [place].  Toilet transfer to bedside commode with Modified [place].                         History:     Past Medical History:   Diagnosis Date    Anxiety     Arthritis     Diabetes mellitus, type 2     GERD (gastroesophageal reflux disease)     Hyperlipidemia     Hypertension     Mild nonproliferative diabetic retinopathy 2015         Past Surgical History:   Procedure Laterality Date    CHOLECYSTECTOMY      LUNG LOBECTOMY Right     after severe pneumonia    NASAL/SINUS ENDOSCOPY      ROTATOR CUFF REPAIR Bilateral     Dr. JERRY Cardona, and Dr. Jung.       Time Tracking:     OT Date of Treatment: 04/22/24  OT Start Time: 1347  OT Stop Time: 1423  OT Total Time (min): 36 min    Billable Minutes:Evaluation 12  Self Care/Home Management 12  Therapeutic Activity 12    4/22/2024

## 2024-04-22 NOTE — H&P
CC:  Right knee pain     Lopez Hicks is a 53 y.o. male with history of Right knee pain. Pain is worse with activity and weight bearing.  Patient has experienced interference of activities of daily living due to decreased range of motion and an increase in joint pain and swelling.  Patient has failed non-operative treatment including NSAIDs, corticosteroid injections, viscosupplement injections, and activity modification.  Lopez Hicks currently ambulates independently.      Relevant medical conditions of significance in perioperative period:  DM type 2- on medication managed by pcp  HTN- on medication managed by pcp  HLD- on medication managed by pcp  Anxiety- on medication managed by pcp          Past Medical History:   Diagnosis Date    Anxiety      Arthritis      Diabetes mellitus, type 2      GERD (gastroesophageal reflux disease)      Hyperlipidemia      Hypertension      Mild nonproliferative diabetic retinopathy 2015               Past Surgical History:   Procedure Laterality Date    CHOLECYSTECTOMY        LUNG LOBECTOMY Right       after severe pneumonia    NASAL/SINUS ENDOSCOPY        ROTATOR CUFF REPAIR Bilateral       Dr. JERRY Cardona, and Dr. Jung.               Family History   Problem Relation Age of Onset    Hypertension Mother      Diabetes Father      Hyperlipidemia Father      Hypertension Father      Heart disease Father      Prostate cancer Father 60         radioactive seed    Diabetes Paternal Grandmother                 Review of patient's allergies indicates:   Allergen Reactions    Lisinopril         Muscle aches and joint stiffness     Ozempic [semaglutide]         Severe heartburn            Current Medications      Current Outpatient Medications:     acetaminophen (TYLENOL) 650 MG TbSR, Take 1,300 mg by mouth daily as needed., Disp: , Rfl:     acetaZOLAMIDE (DIAMOX) 500 mg CpSR, 1 tab PO BID, start 24 hrs before ascent and take for at least 48 hrs at altitude, Disp: 30  "capsule, Rfl: 0    amLODIPine (NORVASC) 10 MG tablet, TAKE 1 TABLET BY MOUTH EVERY DAY, Disp: 90 tablet, Rfl: 3    aspirin (ECOTRIN) 81 MG EC tablet, Take 81 mg by mouth once daily., Disp: , Rfl:     atenoloL (TENORMIN) 100 MG tablet, TAKE 1 TABLET BY MOUTH ONCE DAILY, Disp: 90 tablet, Rfl: 3    blood sugar diagnostic Strp, Check blood glucose 4 times a day - before meals and at bedtime., Disp: 400 strip, Rfl: 3    blood-glucose sensor (DEXCOM G7 SENSOR) Stephanie, Use as directed. Change every 10 days., Disp: 9 each, Rfl: 3    blood-glucose transmitter (DEXCOM G6 TRANSMITTER) Stephanie, Change every 3 months, Disp: 1 each, Rfl: 3    buPROPion (WELLBUTRIN SR) 200 MG SR12, TAKE ONE TABLET BY MOUTH 2 TIMES A DAY, Disp: 180 tablet, Rfl: 3    clindamycin (CLEOCIN) 300 MG capsule, TAKE ONE CAPSULE BY MOUTH EVERY EIGHT HOURS, Disp: 30 capsule, Rfl: 0    diabetic supplies, miscellan. Kit, Please change sensor every 14 days. FreeStyle Kayleigh Sensor 30-day supply (2 sensors/month), Disp: 2 kit, Rfl: 11    fenofibrate (TRICOR) 145 MG tablet, TAKE 1 TABLET BY MOUTH ONCE DAILY, Disp: 90 tablet, Rfl: 3    hydroCHLOROthiazide (HYDRODIURIL) 25 MG tablet, TAKE 1 TABLET BY MOUTH ONCE DAILY, Disp: 90 tablet, Rfl: 3    HYDROcodone-acetaminophen (NORCO) 5-325 mg per tablet, Take 1 tablet by mouth every 6 (six) hours as needed for Pain., Disp: 10 tablet, Rfl: 0    losartan (COZAAR) 100 MG tablet, TAKE 1 TABLET BY MOUTH ONCE DAILY, Disp: 90 tablet, Rfl: 3    metFORMIN (GLUCOPHAGE-XR) 500 MG ER 24hr tablet, TAKE TWO TABLETS BY MOUTH TWICE DAILY WITH MEALS, Disp: 120 tablet, Rfl: 0    multivitamin (THERAGRAN) per tablet, Take 1 tablet by mouth once daily., Disp: , Rfl:     omeprazole (PRILOSEC) 40 MG capsule, TAKE 1 CAPSULE BY MOUTH ONCE DAILY, Disp: 90 capsule, Rfl: 3    ONETOUCH DELICA LANCETS 33 gauge Misc, , Disp: , Rfl:     pen needle, diabetic (BD TENA 2ND GEN PEN NEEDLE) 32 gauge x 5/32" Ndle, USE ONE NEEDLE FOR INJECTION FOUR TIMES DAILY, " "Disp: 400 each, Rfl: 3    pravastatin (PRAVACHOL) 40 MG tablet, TAKE 1 TABLET BY MOUTH ONCE DAILY, Disp: 90 tablet, Rfl: 3    sildenafiL (VIAGRA) 100 MG tablet, Take 1 tablet (100 mg total) by mouth daily as needed for Erectile Dysfunction., Disp: 18 tablet, Rfl: 0    tirzepatide 10 mg/0.5 mL PnIj, Inject 10 mg into the skin every 7 days., Disp: 4 pen , Rfl: 0    vitamin D (VITAMIN D3) 1000 units Tab, Take 1,000 Units by mouth once daily., Disp: , Rfl:     hydrALAZINE (APRESOLINE) 25 MG tablet, Take 1 tablet (25 mg total) by mouth 3 (three) times daily. (Patient taking differently: Take 25 mg by mouth every 12 (twelve) hours.), Disp: 90 tablet, Rfl: 11    insulin degludec (TRESIBA FLEXTOUCH U-200) 200 unit/mL (3 mL) insulin pen, INJECT 70 UNITS SUBCUTANEOUSLY 2 TIMES A DAY, Disp: 10 pen , Rfl: 1    insulin lispro (HUMALOG KWIKPEN INSULIN) 100 unit/mL pen, INJECT 20 UNITS INTO THE SKIN 3 (THREE) TIMES DAILY WITH MEALS., Disp: 30 mL, Rfl: 1    loratadine (CLARITIN ORAL), Take by mouth as needed. Allergies, Disp: , Rfl:         Review of Systems:  Constitutional: no fever or chills  Eyes: no visual changes  ENT: no nasal congestion or sore throat  Respiratory: no cough or shortness of breath  Cardiovascular: no chest pain or palpitations  Gastrointestinal: no nausea or vomiting, tolerating diet  Genitourinary: no hematuria or dysuria  Integument/Breast: no rash or pruritis  Hematologic/Lymphatic: no easy bruising or lymphadenopathy  Musculoskeletal: positive for knee pain  Neurological: no seizures or tremors  Behavioral/Psych: no auditory or visual hallucinations  Endocrine: no heat or cold intolerance     PE:  Ht 6' 3.39" (1.915 m)   Wt 121.2 kg (267 lb 4.9 oz)   BMI 33.06 kg/m²   General: Pleasant, cooperative, NAD   Gait: antalgic  HEENT: NCAT, sclera nonicteric   Lungs: Respirations clear bilaterally; equal and unlabored.   CV: S1S2; 2+ bilateral upper and lower extremity pulses.   Skin: Intact throughout with " no rashes, erythema, or lesions  Extremities: No LE edema,  no erythema or warmth of the skin in either lower extremity.     Right knee exam:  Knee Range of Motion:normal, pain with passive range of motion  Effusion:none  Condition of skin:intact  Location of tenderness:Medial joint line   Strength:normal  Stability: stable to testing     Hip Examination: painless PROM of hip      Radiographs: Radiographs reveal advanced degenerative changes including subchondral cyst formation, subchondral sclerosis, osteophyte formation, joint space narrowing.      Knee Alignment: normal     Diagnosis: Primary osteoarthritis Right knee     Plan: Right total knee arthroplasty     Due to the serious nature of total joint infection and the high prevalence of community acquired MRSA, vancomycin will be used perioperatively.

## 2024-04-22 NOTE — PLAN OF CARE
"Patient tolerated PT session well. Patient ambulated 80ft with RW and contact guard assistance. No LOB or SOB noted. Patient ascended/descended 4" step forward and backward (to simulate stepping up onto running board of [employer]) with RW and contact guard assistance. Patient ready to discharge home from PT standpoint.      Problem: Physical Therapy  Goal: Physical Therapy Goal  Outcome: Met     "

## 2024-04-22 NOTE — PLAN OF CARE
Problem: Occupational Therapy  Goal: Occupational Therapy Goal  Description: Goals to be met by: 4/22/24     Patient will increase functional independence with ADLs by performing:    UE Dressing with [place].  LE Dressing with [place].  Grooming while standing at sink with Modified [place].  Toileting from toilet with Modified [place] for hygiene and clothing management.   Bathing from  shower chair/bench with Modified [place].  Toilet transfer to bedside commode with Modified [place].    Outcome: Ongoing, Progressing

## 2024-04-22 NOTE — OP NOTE
[facility] Right TKA  OPERATIVE NOTE    Date of Operation:   4/22/2024    Providers Performing:   Surgeon(s):  Jt Carranza III, MD  Assistant: Radha Gillette MD    Operative Procedure:   Right Total Knee Arthroplasty, using Depuy [employer] Muse[employer] robotic assisted solution, CPT 94666  Computer-assisted surgical navigational procedure for musculoskeletal procedures, image-less, CPT 26146  Surgical techniques requiring use of robotic surgical system, CPT     Preoperative Diagnosis:   Right Knee Osteoarthritis, ICD-10 M17.11    Postoperative Diagnosis:   SAME    Components Used:   Depuy hybrid fixatin  Femur: Attune PS Size 7 cemented  Tibia: Attune tibial base affixium fixed bearing Size 8  Patella: Attune medialized dome 41 mm, AOX  Tibial Insert: Attune fixed bearing PS size 6 mm, AOX    2 bags simplex P    Implant Name Type Inv. Item Serial No.  Lot No. LRB No. Used Action   CEMENT BONE SURG SMPLX P RADPQ - [id number]  CEMENT BONE SURG SMPLX P RADPQ  [employer]. [id number] Right 2 Implanted   Attune Tibial Base Size 8    [employer] [id number] Right 1 Implanted   PATELLA ATTUNE MEDLZD DOME 41 - [id number]  PATELLA ATTUNE MEDLZD DOME 41  [employer] [id number] Right 1 Implanted   COMP FEM POS ATTUNE SZ7 R - [id number]  COMP FEM POS ATTUNE SZ7 R  [employer] [id number] Right 1 Implanted   Attune Tibial Insert Size 7    [employer] [id number] Right 1 Implanted       Anesthesia:   Spinal+catheter    SERGEI cocktail: [id number]    Estimated Blood Loss:   350 cc    Specimens:   None    Complications:   None    Indications:   The patient has failed non-operative measures including medications, injections and activity modifications for their knee.  After an explanation of risks and benefits of knee arthroplasty surgery, the patient wishes to proceed with surgery.    Operative Notes:   The patient was greeted in the pre-op holding area.  The patients knee was marked with a surgical marker by the surgeon.  Anesthesia per above  technique was administered by the anesthesia team.  The patient was placed in the supine position on the OR table with all bony prominences padded.  A tourniquet was not used.  IV antibiotics were administered.  The leg was prepped and draped in the usual sterile fashion.  A timeout was performed and the correct patient, site and procedure were identified.    A midline incision was made with a standard medial parapatellar arthrotomy.  The standard medial capsular release was performed along with the lateral gutter.  The patella was mobilized from the lateral parapatellar ligament and bony osteophytes were removed.  The intercondylar notch was cleared of the ACL and PCL.    The femoral and tibial guide pins where placed and the arrays were positioned and tightened to the pins. The hip was then brought through a range of motion and the hip center was identified, medial and lateral malleolus were identified and the tibia and femur were registered.     Using a tibia first with tensioner technique the joint was first tensioned manually in extension and flexion and through the full range of motion to define our gaps. Provisional cuts/implants were positioned virtually for appropriate size gaps and implant placement.    The VELYS robot was then brought into position and checkpoints were confirmed. Care was taken during the burring process to protect the soft tissue. We cut the tibia. The tensioner was then placed in the joint and the knee was again extension and flexion and through the full range of motion to define our gaps. The cuts/implants were positioned virtually for appropriate size gaps and implant placement and the femoral cuts were then made.     The PS box was cut. Meniscal remnants were removed and the knee was brought into flexion and posterior osteophytes were removed.      At this time the trial implants were placed and knee assessed for stability, and flexion arc. We noted that there was over-resection for  "a size 8 so we changed to size 7 which fit well. Recheck of the femoral checkpoint was 2-3mm distal than original so clamp may have shifted between the ant/ant c. cuts and the distal/distal c/post cuts as the last 3 checked out to the new "ok" checkpoint which was 2-3mm distal to the original pin site checkpoint. The size 7 fit well A to P but slight gap at ant c cut so changed from PF to cemented. We also recut the DF 1 more mm to better balance the extension gap and decrease the effect of the ant C over resection.     The patella was prepared using free-hand technique and the three-holed lug drill guide was sized and appropriately assessed. Patella tracking was found to be acceptable. The tibial component rotation was marked. The trials and guide pins were removed. The tibial component was positioned and the keel was drilled and punched.    Cement was mixed on the back table and applied to the patella.  Implantation of the femoral, tibial components was performed sequentially with excellent primary stability, then the patella was cemented with removal of all excess cement. A trial insert was placed while the cement dried and a 0.35% betadine solution was left to soak in the surgical field.     After the cement was dry, the trial poly insert was removed. Hemostasis was obtained with bovie electrocautery.  The knee was copiously irrigated with pulsatile lavage. The final polyethylene insert was placed and the knee reduced.      1 gram of vancomycin powder was placed in the knee. The arthrotomy was closed with interrupted #1 vicryl suture and #2 quill, the subcuticular layer was closed with 2-0 vicryl suture and a running 4-0 monocryl was used to closed the skin surface.  Pin sites were closed with 4-0 monocryl and skin glue. Surgicel sealant was placed over the top of the incision and sterile dressing placed over the wound. Appropriately sized TEDs hose were placed for edema control.     Sponge and needle counts " were correct.    The first-assistant was critical to all steps of the operation, including retraction and leg stabilization during exposure and bone preparation, as well as the deep and superficial wound closure.  Dr. Carranza performed and/or supervised the key portions of this surgical procedure, including evaluation of the bone cuts, reshaping of the bony elements, and insertion of the provisional and final components.    Postoperative Plan:  The patient will be anticoagulated with 81mg aspirin twice daily for one month.   They will receive 24 hours of post-operative antibiotics.    Activity will be weight bearing as tolerated.    Same day  Non-[facility] PT      Due patient and or surgical factors the patient will receive an Rx for cefadroxil 500mg BID x7 days after discharge per [place] data:   Mari MM, Stefano JE, Noelle M, Jamie LEA. [facility] [place] Clinical Research Award: Extended Oral Antibiotics Prevent Periprosthetic Joint Infection in High-Risk Cases: 3855 Patients With 1-Year Follow-Up. J Arthroplasty. 2021 Jul;36(7S):S18-S25. doi: [email or web address]. Epub 2021 Jan 23. PMID: [id number].      Signed by: Jt Carranza III, MD

## 2024-04-22 NOTE — ANESTHESIA PROCEDURE NOTES
CSE    Patient location during procedure: OR  Start time: 4/22/2024 8:40 AM  Timeout: 4/22/2024 8:38 AM  End time: 4/22/2024 8:43 AM      Staffing  Authorizing Provider: Moriah Carmen MD  Performing Provider: Moriah Carmen MD    Staffing  Performed by: Moriah Carmen MD  Authorized by: Moriah Carmen MD    Preanesthetic Checklist  Completed: patient identified, IV checked, site marked, risks and benefits discussed, surgical consent, monitors and equipment checked, pre-op evaluation and timeout performed  CSE  Patient position: sitting  Prep: ChloraPrep  Patient monitoring: heart rate and frequent blood pressure checks  Spinal Needle  Needle type: Leanne   Needle gauge: 25 G  Needle length: 5 in  Epidural Needle  Injection technique: JAUN air  Needle type: Tuohy   Needle gauge: 17 G  Needle length: 3.5 in  Needle insertion depth: 7 cm  Location: L3-4  Needle localization: anatomical landmarks   Catheter  Catheter type: springwound  Catheter size: 19 G  Catheter at skin depth: 11 cm  Assessment  Sensory level: T8   Dermatomal levels determined by pinch or prick  Intrathecal Medications:   administered: primary anesthetic mcg of    Medications:    Medications: Intrathecal - mepivacaine 20 mg/mL (2 %) injection - Intrathecal   3 mL - 4/22/2024 8:38:00 AM

## 2024-04-22 NOTE — PT/OT/SLP EVAL
"Physical Therapy Evaluation, Treatment, and Discharge Note    Patient Name:  Lopez Hicks   MRN:  [id number]    Recommendations:     Discharge Recommendations: Low Intensity Therapy  Discharge Equipment Recommendations: walker, rolling   Barriers to discharge: None    Assessment:     Lopez Hicks is a 53 y.o. male admitted with a medical diagnosis of Primary osteoarthritis of right knee. Patient tolerated PT session well. Patient ambulated 80ft with RW and contact guard assistance. No LOB or SOB noted. Patient ascended/descended 4" step forward and backward (to simulate stepping up onto running board of SUV) with RW and contact guard assistance. Patient ready to discharge home from PT standpoint.      Recent Surgery: Procedure(s) (LRB):  ARTHROPLASTY, KNEE, TOTAL, USING COMPUTER-ASSISTED NAVIGATION: VELYS: RIGHT: DEPUY - ATTUNE: SAME DAY (Right) Day of Surgery    Plan:     During this hospitalization, patient does not require further acute PT services.  Please re-consult if situation changes.      Subjective     Chief Complaint: Right knee pain.   Patient/Family Comments/goals: Get back to everything.   Pain/Comfort:  Pain Rating 1: 8/10  Location - Side 1: Right  Location - Orientation 1: anterior  Location 1: knee  Pain Addressed 1: Pre-medicate for activity, Reposition, Distraction    Patients cultural, spiritual, Anabaptist conflicts given the current situation:  n/a    Living Environment:  Patient lives with his wife in a H with 1 step to enter thru the garage. He typically uses the front steps to get into the house, but there are 3 steps with no handrails at that entrance. Prior to admission, patients level of function was independent for functional mobility. Equipment used at home: none. Upon discharge, patient will have assistance from wife.    Objective:     Communicated with RN prior to session.  Patient found  seated in wheelchair with  (no active lines) upon PT entry to room. Wife present " "during PT session.     General Precautions: Standard, fall    Orthopedic Precautions:RLE weight bearing as tolerated   Braces: N/A  Respiratory Status: Room air    Exams:  Cognitive Exam:  Patient is oriented to Person, Place, Time, and Situation  Gross Motor Coordination:  WFL  RLE ROM: WFL except limited at knee due to pain  RLE Strength: appears WFL but did not formally assess due to pain and recent surgery  LLE ROM: WFL  LLE Strength: WFL    Functional Mobility:  Transfers:     Sit to Stand:  contact guard assistance with rolling walker x1 from wheelchair   Gait: Patient ambulated 80ft with Rolling Walker and contact guard assistance using 3-point gait. Patient demonstrated decreased al and decreased step length during gait due to pain, decreased ROM, and decreased strength.  Stairs:  Patient ascended/descended 4" step forward and backward (to simulate stepping up onto running board of SUV) with RW and contact guard assistance.     Treatment and Education:  Patient and wife educated in:  -PT role and POC  -safety with transfers including hand placement  -gait sequencing and RW management  -OOB activity to maximize recovery including ambulating at home to prevent DVT   -SUV transfer  -curb training  -HEP for therex at home with handout provided     AM-PAC 6 CLICK MOBILITY  Total Score:23     Patient left  seated in transport chair with all lines intact, call button in reach, RN notified, and wife present.    GOALS:   Multidisciplinary Problems       Physical Therapy Goals       Not on file              Multidisciplinary Problems (Resolved)          Problem: Physical Therapy    Goal Priority Disciplines Outcome Goal Variances Interventions   Physical Therapy Goal   (Resolved)     PT, PT/OT Met                         History:     Past Medical History:   Diagnosis Date    Anxiety     Arthritis     Diabetes mellitus, type 2     GERD (gastroesophageal reflux disease)     Hyperlipidemia     Hypertension     Mild " nonproliferative diabetic retinopathy 2015       Past Surgical History:   Procedure Laterality Date    CHOLECYSTECTOMY      LUNG LOBECTOMY Right     after severe pneumonia    NASAL/SINUS ENDOSCOPY      ROTATOR CUFF REPAIR Bilateral     Dr. JERRY Cardona, and Dr. Jung.       Time Tracking:     PT Received On: 04/22/24  PT Start Time: 1421     PT Stop Time: 1441  PT Total Time (min): 20 min     Billable Minutes: Evaluation 8 and Gait Training 12    04/22/2024

## 2024-04-22 NOTE — DISCHARGE SUMMARY
"[place] - Surgery (Hospital)  Discharge Note  Short Stay    Procedure(s) (LRB):  ARTHROPLASTY, KNEE, TOTAL, USING COMPUTER-ASSISTED NAVIGATION: VELYS: RIGHT: DEPUY - ATTUNE: SAME DAY (Right)      OUTCOME: Patient tolerated treatment/procedure well without complication and is now ready for discharge.    Lopez Hicks underwent R TKA 4/22/24 with Dr. Carranza at [facility]. Surgery was uncomplicated. Their weight bearing status was WBAT and ROMAT. Post operatively they recovered in PACU without complication. On POD0 they were meeting all measures for discharge including pain well controlled on multimodal regimen, tolerating PO, voiding, mobilizing well with PT/OT. They were discharged home in good condition. Post operative medications were provided including a multimodal pain regimen and ASA 81 bid for DVT ppx x 30d. They will follow up in clinic as scheduled 5/7/24.       DISPOSITION: Home or Self Care    FINAL DIAGNOSIS:  Primary osteoarthritis of right knee    FOLLOWUP: In clinic 5/7/24    DISCHARGE INSTRUCTIONS:    Discharge Procedure Orders   WALKER FOR HOME USE     Order Specific Question Answer Comments   Type of Walker: Adult (5'4"-6'6")    With wheels? Yes    Height: 6' 4" (1.93 m)    Weight: 122 kg (269 lb)    Length of need (1-99 months): 99    Please check all that apply: Patient is unable to safely ambulate without equipment.      Activity as tolerated     Sponge bath only until clinic visit     Keep surgical extremity elevated when not ambulating     Lifting restrictions   Order Comments: No strenuous exercise or lifting of > 10 lbs     Weight bearing as tolerated     No driving, operating heavy equipment or signing legal documents while taking pain medication     Leave dressing on - Keep it clean, dry, and intact until clinic visit   Order Comments: Do not remove surgical dressing for 2 weeks post-op. This will be done only by MD at initial post-op visit.    If dressing saturated or there are " signs of infection, please call Ortho Clinic [phone] for further instructions and to make appt to be seen.     On POD1 remove ace wrap and cotton padding. Leave Aquacel bandage on until 2  week post op visit in clinic     Call MD for:  temperature >100.4     Call MD for:  persistent nausea and vomiting     Call MD for:  severe uncontrolled pain     Call MD for:  difficulty breathing, headache or visual disturbances     Call MD for:  redness, tenderness, or signs of infection (pain, swelling, redness, odor or green/yellow discharge around incision site)     Call MD for:  hives     Call MD for:  persistent dizziness or light-headedness     Call MD for:  extreme fatigue        TIME SPENT ON DISCHARGE: 45 minutes

## 2024-04-22 NOTE — ANESTHESIA PREPROCEDURE EVALUATION
04/22/2024  Lopez Hicks is a 53 y.o., male.    Pre-operative evaluation for R TKA.        Patient Active Problem List   Diagnosis    Type 2 diabetes mellitus with diabetic polyneuropathy    Hypertriglyceridemia    Gastroesophageal reflux disease without esophagitis    Encounter for screening colonoscopy    Hypertension    Hyperlipidemia associated with type 2 diabetes mellitus    Difficulty concentrating    Anxiety    Erectile dysfunction    Sleep apnea    Need for vaccination    Allergies    BMI 32.0-32.9,adult    History of lobectomy of lung    Anemia    History of fatty infiltration of liver    History of COVID-19       Review of patient's allergies indicates:   Allergen Reactions    Lisinopril      Muscle aches and joint stiffness     Ozempic [semaglutide]      Severe heartburn        No current facility-administered medications on file prior to encounter.     Current Outpatient Medications on File Prior to Encounter   Medication Sig Dispense Refill    acetaminophen (TYLENOL) 650 MG TbSR Take 1,300 mg by mouth daily as needed.      buPROPion (WELLBUTRIN SR) 200 MG SR12 TAKE ONE TABLET BY MOUTH 2 TIMES A  tablet 3    hydrALAZINE (APRESOLINE) 25 MG tablet Take 1 tablet (25 mg total) by mouth 3 (three) times daily. (Patient taking differently: Take 25 mg by mouth every 12 (twelve) hours.) 90 tablet 11    HYDROcodone-acetaminophen (NORCO) 5-325 mg per tablet Take 1 tablet by mouth every 6 (six) hours as needed for Pain. 10 tablet 0    omeprazole (PRILOSEC) 40 MG capsule TAKE 1 CAPSULE BY MOUTH ONCE DAILY 90 capsule 3    pravastatin (PRAVACHOL) 40 MG tablet TAKE 1 TABLET BY MOUTH ONCE DAILY 90 tablet 3    acetaZOLAMIDE (DIAMOX) 500 mg CpSR 1 tab PO BID, start 24 hrs before ascent and take for at least 48 hrs at altitude 30 capsule 0    aspirin (ECOTRIN) 81 MG EC tablet Take 81 mg by mouth once  "daily.      blood sugar diagnostic Strp Check blood glucose 4 times a day - before meals and at bedtime. 400 strip 3    clindamycin (CLEOCIN) 300 MG capsule TAKE ONE CAPSULE BY MOUTH EVERY EIGHT HOURS 30 capsule 0    multivitamin (THERAGRAN) per tablet Take 1 tablet by mouth once daily.      ONETOUCH DELICA LANCETS 33 gauge Misc       pen needle, diabetic (BD TENA 2ND GEN PEN NEEDLE) 32 gauge x 5/32" Ndle USE ONE NEEDLE FOR INJECTION FOUR TIMES DAILY 400 each 3    sildenafiL (VIAGRA) 100 MG tablet Take 1 tablet (100 mg total) by mouth daily as needed for Erectile Dysfunction. 18 tablet 0    vitamin D (VITAMIN D3) 1000 units Tab Take 1,000 Units by mouth once daily.         Past Surgical History:   Procedure Laterality Date    CHOLECYSTECTOMY      LUNG LOBECTOMY Right     after severe pneumonia    NASAL/SINUS ENDOSCOPY      ROTATOR CUFF REPAIR Bilateral     Dr. JERRY Cardona, and Dr. Jung.       Social History     Socioeconomic History    Marital status:     Number of children: 3   Occupational History    Occupation:    Tobacco Use    Smoking status: Never    Smokeless tobacco: Never   Substance and Sexual Activity    Alcohol use: Yes     Alcohol/week: 0.0 standard drinks of alcohol     Comment: beer once every 3 months    Drug use: No    Sexual activity: Yes     Partners: Female   Social History Narrative     and lives with wife .  Works at [employer].  Enjoys hunting and fishing.  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      Social Determinants of Health     Financial Resource Strain: Low Risk  (3/10/2024)    Overall Financial Resource Strain (CARDIA)     Difficulty of Paying Living Expenses: Not hard at all   Food Insecurity: No Food Insecurity (3/10/2024)    Hunger Vital Sign     Worried About Running Out of Food in the Last Year: Never true     Ran Out of Food in the Last Year: Never true   Transportation Needs: No Transportation Needs (3/10/2024)    PRAPARE - " "Transportation     Lack of Transportation (Medical): No     Lack of Transportation (Non-Medical): No   Physical Activity: Sufficiently Active (3/10/2024)    Exercise Vital Sign     Days of Exercise per Week: 5 days     Minutes of Exercise per Session: 90 min   Stress: No Stress Concern Present (3/10/2024)    [employer] - Occupational Stress Questionnaire     Feeling of Stress : Only a little   Social Connections: Unknown (3/10/2024)    Social Connection and Isolation Panel [NHANES]     Frequency of Communication with Friends and Family: More than three times a week     Frequency of Social Gatherings with Friends and Family: Twice a week     Active Member of Clubs or Organizations: Yes     Attends Club or Organization Meetings: More than 4 times per year     Marital Status:    Housing Stability: Low Risk  (3/10/2024)    Housing Stability Vital Sign     Unable to Pay for Housing in the Last Year: No     Number of Places Lived in the Last Year: 1     Unstable Housing in the Last Year: No         CBC: No results for input(s): "WBC", "RBC", "HGB", "HCT", "PLT", "MCV", "MCH", "MCHC" in the last 72 hours.    CMP: No results for input(s): "NA", "K", "CL", "CO2", "BUN", "CREATININE", "GLU", "MG", "PHOS", "CALCIUM", "ALBUMIN", "PROT", "ALKPHOS", "ALT", "AST", "BILITOT" in the last 72 hours.    INR  No results for input(s): "PT", "INR", "PROTIME", "APTT" in the last 72 hours.        Diagnostic Studies:      EKD Echo:  No results found for this or any previous visit.        Pre-op Assessment    I have reviewed the Patient Summary Reports.     I have reviewed the Nursing Notes. I have reviewed the NPO Status.   I have reviewed the Medications.     Review of Systems  Anesthesia Hx:  No problems with previous Anesthesia                Social:  Social Alcohol Use, Non-Smoker       Cardiovascular:  Exercise tolerance: good   Hypertension           hyperlipidemia                           "   Pulmonary:        Sleep Apnea, CPAP                Hepatic/GI:     GERD, well controlled             Neurological:        Peripheral Neuropathy                          Endocrine:  Diabetes, type 2  Hyperthyroidism On mounjaro      Obesity / BMI > 30      Physical Exam  General: Well nourished, Cooperative and Alert    Airway:  Mallampati: I   Mouth Opening: Normal  TM Distance: Normal  Tongue: Normal  Neck ROM: Normal ROM    Dental:  Intact    Chest/Lungs:  Normal Respiratory Rate    Heart:  Rate: Normal  Rhythm: Regular Rhythm  Sounds: Normal        Anesthesia Plan  Type of Anesthesia, risks & benefits discussed:    Anesthesia Type: Spinal, CSE, Gen ETT, Regional  Intra-op Monitoring Plan: Standard ASA Monitors  Post Op Pain Control Plan: multimodal analgesia and IV/PO Opioids PRN  Induction:  IV  Airway Plan: Direct, Post-Induction  Informed Consent: Informed consent signed with the Patient and all parties understand the risks and agree with anesthesia plan.  All questions answered.   ASA Score: 3  Day of Surgery Review of History & Physical: H&P Update referred to the surgeon/provider.  Anesthesia Plan Notes: Discussed neuraxial as preferred technique, nerve block, GA, ETT . All questions were answered. Patient is agreeable to neuraxial and nerve block. E-consent obtained in preop clinic.    Mounjaro held for 2 weeks.  He has held his oral Janumet for 24 hours.      Ready For Surgery From Anesthesia Perspective.     .

## 2024-04-22 NOTE — TRANSFER OF CARE
"Anesthesia Transfer of Care Note    Patient: Lopez Hicks    Procedure(s) Performed: Procedure(s) (LRB):  ARTHROPLASTY, KNEE, TOTAL, USING COMPUTER-ASSISTED NAVIGATION: VELYS: RIGHT: DEPUY - ATTUNE: SAME DAY (Right)    Patient location: PACU    Anesthesia Type: CSE    Transport from OR: Transported from OR on 6-10 L/min O2 by face mask with adequate spontaneous ventilation    Post pain: adequate analgesia    Post assessment: no apparent anesthetic complications    Post vital signs: stable    Level of consciousness: awake and alert    Nausea/Vomiting: no nausea/vomiting    Complications: none    Transfer of care protocol was followed      Last vitals: Visit Vitals  BP (!) 152/81 (BP Location: Left arm, Patient Position: Lying)   Pulse 69   Temp 37.1 °C (98.7 °F) (Oral)   Resp 16   Ht 6' 4" (1.93 m)   Wt 122 kg (269 lb)   SpO2 97%   BMI 32.74 kg/m²     "

## 2024-04-23 ENCOUNTER — PATIENT MESSAGE (OUTPATIENT)
Dept: ADMINISTRATIVE | Facility: OTHER | Age: 54
End: 2024-04-23
Payer: COMMERCIAL

## 2024-04-23 NOTE — ANESTHESIA POSTPROCEDURE EVALUATION
Anesthesia Post Evaluation    Patient: Lopez Hicks    Procedure(s) Performed: Procedure(s) (LRB):  ARTHROPLASTY, KNEE, TOTAL, USING COMPUTER-ASSISTED NAVIGATION: VELYS: RIGHT: DEPUY - ATTUNE: SAME DAY (Right)    Final Anesthesia Type: CSE      Patient location during evaluation: PACU  Patient participation: Yes- Able to Participate  Level of consciousness: awake and alert  Post-procedure vital signs: reviewed and stable  Pain management: adequate  Airway patency: patent  BEVERLY mitigation strategies: Multimodal analgesia  PONV status at discharge: No PONV  Anesthetic complications: no      Cardiovascular status: blood pressure returned to baseline  Respiratory status: unassisted  Hydration status: euvolemic  Follow-up not needed.              Vitals Value Taken Time   /63 04/22/24 1332   Temp 36.8 °C (98.3 °F) 04/22/24 1500   Pulse 68 04/22/24 1345   Resp 20 04/22/24 1444   SpO2 91 % 04/22/24 1345   Vitals shown include unfiled device data.      Event Time   Out of Recovery 04/22/2024 14:00:00         Pain/Alex Score: Pain Rating Prior to Med Admin: 7 (4/22/2024  2:44 PM)  Alex Score: 10 (4/22/2024 12:05 PM)

## 2024-04-24 ENCOUNTER — CLINICAL SUPPORT (OUTPATIENT)
Dept: ORTHOPEDICS | Facility: CLINIC | Age: 54
End: 2024-04-24
Payer: COMMERCIAL

## 2024-04-24 DIAGNOSIS — Z96.651 STATUS POST RIGHT KNEE REPLACEMENT: Primary | ICD-10-CM

## 2024-04-24 PROCEDURE — 99499 UNLISTED E&M SERVICE: CPT | Mod: 95,,, | Performed by: ORTHOPAEDIC SURGERY

## 2024-04-24 NOTE — PROGRESS NOTES
I called the patient today regarding his surgery with Dr. Jt Carranza III. The patient had a right TKA on 4/22.     Pain Scale: 4 / 10    Any issues with Fever: No.    Any issues with medications (specifically DVT prophylaxis): No. ASA 81 BID  Celebrex : yes  Protonix : yes  Resume home meds : Yes    Any issues with bowel movements:  Passing florin: No.                                                                 Urination: No.                                                                 Constipation: No.    Completing at home exercises: Yes:     Any concerns regarding their dressing/bandage:  No.    Patient confirmed [facility] at pm.     Any other concerns: No., nausea this morning, but has since resolved    Blue Bracelet : yes    The patient was informed that if they have any urgent issues with their bandage, medications or any other health concerns regarding their surgery to call the 24/7 Orthopedic Post-op Hot Line at [phone]. The patient was reminded that if they have any chest pain or shortness of breath to call 911 or go to the ER.    The patient verbalized understanding and does not have any other questions

## 2024-04-25 ENCOUNTER — PATIENT MESSAGE (OUTPATIENT)
Dept: ADMINISTRATIVE | Facility: OTHER | Age: 54
End: 2024-04-25
Payer: COMMERCIAL

## 2024-04-27 ENCOUNTER — PATIENT MESSAGE (OUTPATIENT)
Dept: ADMINISTRATIVE | Facility: OTHER | Age: 54
End: 2024-04-27
Payer: COMMERCIAL

## 2024-04-27 ENCOUNTER — PATIENT MESSAGE (OUTPATIENT)
Dept: ORTHOPEDICS | Facility: CLINIC | Age: 54
End: 2024-04-27
Payer: COMMERCIAL

## 2024-04-27 RX ORDER — OXYCODONE HYDROCHLORIDE 5 MG/1
TABLET ORAL
Qty: 40 TABLET | Refills: 0 | Status: SHIPPED | OUTPATIENT
Start: 2024-04-27 | End: 2024-05-13 | Stop reason: SDUPTHER

## 2024-04-28 ENCOUNTER — PATIENT MESSAGE (OUTPATIENT)
Dept: ADMINISTRATIVE | Facility: OTHER | Age: 54
End: 2024-04-28
Payer: COMMERCIAL

## 2024-04-29 ENCOUNTER — PATIENT MESSAGE (OUTPATIENT)
Dept: ADMINISTRATIVE | Facility: OTHER | Age: 54
End: 2024-04-29
Payer: COMMERCIAL

## 2024-04-30 ENCOUNTER — PATIENT MESSAGE (OUTPATIENT)
Dept: ADMINISTRATIVE | Facility: OTHER | Age: 54
End: 2024-04-30
Payer: COMMERCIAL

## 2024-05-01 ENCOUNTER — PATIENT MESSAGE (OUTPATIENT)
Dept: ADMINISTRATIVE | Facility: OTHER | Age: 54
End: 2024-05-01
Payer: COMMERCIAL

## 2024-05-02 ENCOUNTER — PATIENT MESSAGE (OUTPATIENT)
Dept: ADMINISTRATIVE | Facility: OTHER | Age: 54
End: 2024-05-02
Payer: COMMERCIAL

## 2024-05-03 ENCOUNTER — PATIENT MESSAGE (OUTPATIENT)
Dept: ADMINISTRATIVE | Facility: OTHER | Age: 54
End: 2024-05-03
Payer: COMMERCIAL

## 2024-05-04 ENCOUNTER — PATIENT MESSAGE (OUTPATIENT)
Dept: ADMINISTRATIVE | Facility: OTHER | Age: 54
End: 2024-05-04
Payer: COMMERCIAL

## 2024-05-05 ENCOUNTER — PATIENT MESSAGE (OUTPATIENT)
Dept: ADMINISTRATIVE | Facility: OTHER | Age: 54
End: 2024-05-05
Payer: COMMERCIAL

## 2024-05-06 ENCOUNTER — PATIENT MESSAGE (OUTPATIENT)
Dept: ADMINISTRATIVE | Facility: OTHER | Age: 54
End: 2024-05-06
Payer: COMMERCIAL

## 2024-05-08 ENCOUNTER — OFFICE VISIT (OUTPATIENT)
Dept: ORTHOPEDICS | Facility: CLINIC | Age: 54
End: 2024-05-08
Payer: COMMERCIAL

## 2024-05-08 VITALS — BODY MASS INDEX: 32.75 KG/M2 | WEIGHT: 268.94 LBS | HEIGHT: 76 IN

## 2024-05-08 DIAGNOSIS — Z96.651 STATUS POST RIGHT KNEE REPLACEMENT: Primary | ICD-10-CM

## 2024-05-08 PROCEDURE — 99999 PR PBB SHADOW E&M-EST. PATIENT-LVL III: CPT | Mod: PBBFAC,,, | Performed by: NURSE PRACTITIONER

## 2024-05-08 PROCEDURE — 99024 POSTOP FOLLOW-UP VISIT: CPT | Mod: S$GLB,,, | Performed by: NURSE PRACTITIONER

## 2024-05-08 NOTE — PROGRESS NOTES
"Lopez Hicks presents for initial post-operative visit following a right total knee arthroplasty performed by Dr. Carranza on 4/22/2024    Exam:   Height 6' 4" (1.93 m), weight 122 kg (268 lb 15.4 oz).   Ambulating well with assistive device- using a cane for long distance  Incision is clean and dry without drainage or erythema.   ROM:0-110    Initial post-operative radiographs reviewed today revealing a well fixed and aligned prosthesis.    A/P:  2 weeks s/p right total knee replacement    - The patient was advised to keep the incision clean and dry for the next 24 hours after which he may wash the area with antibacterial soap in the shower. Will not submerge incision until one month post op.  - Outpatient PT: ongoing at [facility]  - Continue aspirin for 1 month post op.  - Pain medication refill not needed  -  - Follow up in 4 weeks with surgeon. Pt will call clinic with problems/concerns.        "

## 2024-05-13 DIAGNOSIS — E11.42 TYPE 2 DIABETES MELLITUS WITH DIABETIC POLYNEUROPATHY, WITH LONG-TERM CURRENT USE OF INSULIN: ICD-10-CM

## 2024-05-13 DIAGNOSIS — Z79.4 TYPE 2 DIABETES MELLITUS WITH DIABETIC POLYNEUROPATHY, WITH LONG-TERM CURRENT USE OF INSULIN: ICD-10-CM

## 2024-05-14 RX ORDER — ASPIRIN 81 MG/1
81 TABLET ORAL 2 TIMES DAILY
Qty: 60 TABLET | Refills: 0 | Status: SHIPPED | OUTPATIENT
Start: 2024-05-14 | End: 2024-06-13

## 2024-05-14 RX ORDER — PEN NEEDLE, DIABETIC 30 GX3/16"
NEEDLE, DISPOSABLE MISCELLANEOUS
Qty: 400 EACH | Refills: 3 | Status: SHIPPED | OUTPATIENT
Start: 2024-05-14

## 2024-05-14 RX ORDER — OXYCODONE HYDROCHLORIDE 5 MG/1
TABLET ORAL
Qty: 40 TABLET | Refills: 0 | Status: SHIPPED | OUTPATIENT
Start: 2024-05-14

## 2024-05-14 RX ORDER — CELECOXIB 200 MG/1
200 CAPSULE ORAL DAILY
Qty: 30 CAPSULE | Refills: 0 | Status: SHIPPED | OUTPATIENT
Start: 2024-05-14

## 2024-05-14 RX ORDER — PANTOPRAZOLE SODIUM 40 MG/1
40 TABLET, DELAYED RELEASE ORAL DAILY
Qty: 30 TABLET | Refills: 0 | Status: SHIPPED | OUTPATIENT
Start: 2024-05-14 | End: 2024-06-18 | Stop reason: SDUPTHER

## 2024-05-17 ENCOUNTER — PATIENT MESSAGE (OUTPATIENT)
Dept: ORTHOPEDICS | Facility: CLINIC | Age: 54
End: 2024-05-17
Payer: COMMERCIAL

## 2024-05-19 ENCOUNTER — PATIENT MESSAGE (OUTPATIENT)
Dept: ADMINISTRATIVE | Facility: OTHER | Age: 54
End: 2024-05-19
Payer: COMMERCIAL

## 2024-05-20 ENCOUNTER — PATIENT MESSAGE (OUTPATIENT)
Dept: ORTHOPEDICS | Facility: CLINIC | Age: 54
End: 2024-05-20
Payer: COMMERCIAL

## 2024-05-20 DIAGNOSIS — M51.36 DDD (DEGENERATIVE DISC DISEASE), LUMBAR: Primary | ICD-10-CM

## 2024-05-20 RX ORDER — HYDRALAZINE HYDROCHLORIDE 25 MG/1
25 TABLET, FILM COATED ORAL 3 TIMES DAILY
Qty: 270 TABLET | Refills: 3 | Status: SHIPPED | OUTPATIENT
Start: 2024-05-20

## 2024-05-20 NOTE — TELEPHONE ENCOUNTER
Refill Routing Note   Medication(s) are not appropriate for processing by [facility] for the following reason(s):        Outside of protocol    [facility] action(s):  Route             Appointments  past 12m or future 3m with PCP    Date Provider   Last Visit   7/12/2021 Sukumar Mackey, DO   Next Visit   Visit date not found Sukumar Mackey, DO   ED visits in past 90 days: 0        Note composed:9:21 AM 05/20/2024           Palliative care encounter

## 2024-05-20 NOTE — PROGRESS NOTES
DATE: 5/21/2024  PATIENT: Lopez Hicks    Supervising Physician: Joey Del Rio M.D.    CHIEF COMPLAINT: right leg pain    HISTORY:  Lopez Hicks is a 54 y.o. male with a pmhx of DM II sp right knee replacement (4/22/24) here for initial evaluation of low back and right leg pain (Back - 0, Leg - 6).  The pain in the back of the right leg is what bothers him most.  The pain has been present intermittently for years, flaring up recently. He attributes the flare up to recovering from knee replacement. The patient describes the pain as burning.  The pain is worse with walking and improved by nothing. There is negative associated numbness and tingling. There is positive subjective weakness. Prior treatments have included PT and home exercises for 8 weeks in the last 3 months, and remote hx of ESIs, but no surgery. It is the [facility] spine conditioning program. Exercises include head rolls, kneeling back extension, sitting rotation stretch, modified seated side straddle, knee to chest, bird dog, plank, modified seated plank, hip bridges, abdominal bracing, and abdominal crunch. Pt completes each exercise daily for 1 hour with worsening of pain.      The patient denies myelopathic symptoms such as handwriting changes or difficulty with buttons/coins/keys. Denies perineal paresthesias, bowel/bladder dysfunction.    PAST MEDICAL/SURGICAL HISTORY:  Past Medical History:   Diagnosis Date    Anxiety     Arthritis     Diabetes mellitus, type 2     GERD (gastroesophageal reflux disease)     Hyperlipidemia     Hypertension     Mild nonproliferative diabetic retinopathy 2015     Past Surgical History:   Procedure Laterality Date    ARTHROPLASTY, KNEE, TOTAL, USING COMPUTER-ASSISTED NAVIGATION Right 4/22/2024    Procedure: ARTHROPLASTY, KNEE, TOTAL, USING COMPUTER-ASSISTED NAVIGATION: VELYS: RIGHT: DEPUY - ATTUNE: SAME DAY;  Surgeon: Jt Carranza III, MD;  Location: [facility];  Service: Orthopedics;  Laterality: Right;     CHOLECYSTECTOMY      LUNG LOBECTOMY Right     after severe pneumonia    NASAL/SINUS ENDOSCOPY      ROTATOR CUFF REPAIR Bilateral     Dr. JERRY Cardona, and Dr. Jung.       Medications:   Current Outpatient Medications on File Prior to Visit   Medication Sig Dispense Refill    acetaminophen (TYLENOL) 650 MG TbSR Take 1 tablet (650 mg total) by mouth every 8 (eight) hours. 120 tablet 0    acetaZOLAMIDE (DIAMOX) 500 mg CpSR 1 tab PO BID, start 24 hrs before ascent and take for at least 48 hrs at altitude 30 capsule 0    amLODIPine (NORVASC) 10 MG tablet TAKE 1 TABLET BY MOUTH EVERY DAY 90 tablet 3    aspirin (ECOTRIN) 81 MG EC tablet Take 1 tablet (81 mg total) by mouth 2 (two) times a day. 60 tablet 0    atenoloL (TENORMIN) 100 MG tablet TAKE 1 TABLET BY MOUTH ONCE DAILY 90 tablet 3    blood sugar diagnostic Strp Check blood glucose 4 times a day - before meals and at bedtime. 400 strip 3    blood-glucose sensor (DEXCOM G7 SENSOR) Stephanie Use as directed. Change every 10 days. 9 each 3    buPROPion (WELLBUTRIN SR) 200 MG SR12 TAKE ONE TABLET BY MOUTH 2 TIMES A  tablet 3    cefadroxil (DURICEF) 500 MG Cap Take 1 capsule (500 mg total) by mouth every 12 (twelve) hours. 14 capsule 0    celecoxib (CELEBREX) 200 MG capsule Take 1 capsule (200 mg total) by mouth once daily. 30 capsule 0    empagliflozin-metformin (SYNJARDY XR) 12.5-1,000 mg TBph Take 1 tablet by mouth twice daily 60 tablet 5    fenofibrate (TRICOR) 145 MG tablet TAKE 1 TABLET BY MOUTH ONCE DAILY 90 tablet 3    hydrALAZINE (APRESOLINE) 25 MG tablet Take 1 tablet (25 mg total) by mouth 3 (three) times daily. 270 tablet 3    hydroCHLOROthiazide (HYDRODIURIL) 25 MG tablet TAKE 1 TABLET BY MOUTH ONCE DAILY 90 tablet 3    insulin degludec (TRESIBA FLEXTOUCH U-200) 200 unit/mL (3 mL) insulin pen Inject 30 Units into the skin once daily. 3 pen 5    insulin lispro (HUMALOG KWIKPEN INSULIN) 100 unit/mL pen Inject 20 Units into the skin 3 (three) times daily  "before meals. With sliding scale; max daily dose 90 units 30 mL 5    loratadine (CLARITIN ORAL) Take by mouth as needed. Allergies      losartan (COZAAR) 100 MG tablet TAKE 1 TABLET BY MOUTH ONCE DAILY 90 tablet 3    multivitamin (THERAGRAN) per tablet Take 1 tablet by mouth once daily.      omeprazole (PRILOSEC) 40 MG capsule TAKE 1 CAPSULE BY MOUTH ONCE DAILY 90 capsule 3    ONETOUCH DELICA LANCETS 33 gauge Misc       oxyCODONE (ROXICODONE) 5 MG immediate release tablet Take 1-2 tablets by mouth every 4-6 hours as needed for pain 40 tablet 0    pantoprazole (PROTONIX) 40 MG tablet Take 1 tablet (40 mg total) by mouth once daily. 30 tablet 0    pen needle, diabetic (BD TENA 2ND GEN PEN NEEDLE) 32 gauge x 5/32" Ndle USE ONE NEEDLE FOR INJECTION FOUR TIMES DAILY 400 each 3    pravastatin (PRAVACHOL) 40 MG tablet TAKE 1 TABLET BY MOUTH ONCE DAILY 90 tablet 3    senna-docusate 8.6-50 mg (SENNA WITH DOCUSATE SODIUM) 8.6-50 mg per tablet Take 1 tablet by mouth once daily. 30 tablet 0    sildenafiL (VIAGRA) 100 MG tablet Take 1 tablet (100 mg total) by mouth daily as needed for Erectile Dysfunction. 18 tablet 0    tirzepatide 10 mg/0.5 mL PnIj Inject 10 mg into the skin every 7 days. 4 Pen 5    vitamin D (VITAMIN D3) 1000 units Tab Take 1,000 Units by mouth once daily.       No current facility-administered medications on file prior to visit.       Social History:   Social History     Socioeconomic History    Marital status:     Number of children: 3   Occupational History    Occupation:    Tobacco Use    Smoking status: Never    Smokeless tobacco: Never   Substance and Sexual Activity    Alcohol use: Yes     Alcohol/week: 0.0 standard drinks of alcohol     Comment: beer once every 3 months    Drug use: No    Sexual activity: Yes     Partners: Female   Social History Narrative     and lives with wife .  Works at [employer].  Enjoys hunting and fishing.  Has lawn service as side job. Father  from " cancer in 2017.      Social Determinants of Health     Financial Resource Strain: Low Risk  (3/10/2024)    Overall Financial Resource Strain (CARDIA)     Difficulty of Paying Living Expenses: Not hard at all   Food Insecurity: No Food Insecurity (3/10/2024)    Hunger Vital Sign     Worried About Running Out of Food in the Last Year: Never true     Ran Out of Food in the Last Year: Never true   Transportation Needs: No Transportation Needs (3/10/2024)    PRAPARE - Transportation     Lack of Transportation (Medical): No     Lack of Transportation (Non-Medical): No   Physical Activity: Sufficiently Active (3/10/2024)    Exercise Vital Sign     Days of Exercise per Week: 5 days     Minutes of Exercise per Session: 90 min   Stress: No Stress Concern Present (3/10/2024)    [employer] [place] [employer] - Occupational Stress Questionnaire     Feeling of Stress : Only a little   Housing Stability: Low Risk  (3/10/2024)    Housing Stability Vital Sign     Unable to Pay for Housing in the Last Year: No     Number of Places Lived in the Last Year: 1     Unstable Housing in the Last Year: No       REVIEW OF SYSTEMS:  Constitution: Negative. Negative for chills, fever and night sweats.   Cardiovascular: Negative for chest pain and syncope.   Respiratory: Negative for cough and shortness of breath.   Gastrointestinal: See HPI. Negative for nausea/vomiting. Negative for abdominal pain.  Genitourinary: See HPI. Negative for discoloration or dysuria.  Skin: Negative for dry skin, itching and rash.   Hematologic/Lymphatic: Negative for bleeding problem. Does not bruise/bleed easily.   Musculoskeletal: Negative for falls and muscle weakness.   Neurological: See HPI. No seizures.   Endocrine: Negative for polydipsia, polyphagia and polyuria.   Allergic/Immunologic: Negative for hives and persistent infections.     EXAM:  There were no vitals taken for this visit.    General: The patient is a very pleasant 54 y.o. male in no  apparent distress, the patient is oriented to person, place and time.  Psych: Normal mood and affect  HEENT: Vision grossly intact, hearing intact to the spoken word.  Lungs: Respirations unlabored.  Gait: Antalgic limping station and gait, no difficulty with toe or heel walk.   Skin: Dorsal lumbar skin negative for rashes, lesions, hairy patches and surgical scars. There is negative lumbar tenderness to palpation.  Range of motion: Lumbar range of motion is acceptable.  Spinal Balance: Global saggital and coronal spinal balance acceptable, not significant for scoliosis and kyphosis.  Musculoskeletal: No pain with the range of motion of the bilateral hips. No trochanteric tenderness to palpation.  Vascular: Bilateral lower extremities warm and well perfused, dorsalis pedis pulses 2+ bilaterally.  Neurological: Normal strength and tone in all major motor groups in the bilateral lower extremities. Normal sensation to light touch in the L2-S1 dermatomes bilaterally.  Deep tendon reflexes symmetric 2+ in the bilateral lower extremities.  Negative Babinski bilaterally. Straight leg raise positive on right    IMAGING:      Today I personally reviewed AP, Lat and Flex/Ex  upright L-spine films that demonstrate trace anterolisthesis of L4 on L5.      There is no height or weight on file to calculate BMI.    Hemoglobin A1C   Date Value Ref Range Status   02/21/2024 7.1 (H) 4.0 - 5.6 % Final     Comment:     ADA Screening Guidelines:  5.7-6.4%  Consistent with prediabetes  >or=6.5%  Consistent with diabetes    High levels of fetal hemoglobin interfere with the HbA1C  assay. Heterozygous hemoglobin variants (HbS, HgC, etc)do  not significantly interfere with this assay.   However, presence of multiple variants may affect accuracy.     11/22/2023 7.0 (H) 4.0 - 5.6 % Final     Comment:     ADA Screening Guidelines:  5.7-6.4%  Consistent with prediabetes  >or=6.5%  Consistent with diabetes    High levels of fetal hemoglobin  interfere with the HbA1C  assay. Heterozygous hemoglobin variants (HbS, HgC, etc)do  not significantly interfere with this assay.   However, presence of multiple variants may affect accuracy.     07/29/2023 6.7 (H) 4.0 - 5.6 % Final     Comment:     ADA Screening Guidelines:  5.7-6.4%  Consistent with prediabetes  >or=6.5%  Consistent with diabetes    High levels of fetal hemoglobin interfere with the HbA1C  assay. Heterozygous hemoglobin variants (HbS, HgC, etc)do  not significantly interfere with this assay.   However, presence of multiple variants may affect accuracy.             ASSESSMENT/PLAN:    There are no diagnoses linked to this encounter.    Today we discussed at length all of the different treatment options including anti-inflammatories, acetaminophen, rest, ice, heat, physical therapy including strengthening and stretching exercises, home exercises, ROM, aerobic conditioning, aqua therapy, other modalities including ultrasound, massage, and dry needling, epidural steroid injections and finally surgical intervention.      Pt presents with acute on chronic lumbar radiculopathy. Failure of conservative rx. Spoke with Suyapa Peters NP who ok'ed medrol dose pack given recent knee replacement. Will also send gabapentin to pharmacy. Will obtain MRI to further evaluate and call with results.

## 2024-05-21 ENCOUNTER — OFFICE VISIT (OUTPATIENT)
Dept: ORTHOPEDICS | Facility: CLINIC | Age: 54
End: 2024-05-21
Payer: COMMERCIAL

## 2024-05-21 ENCOUNTER — HOSPITAL ENCOUNTER (OUTPATIENT)
Dept: RADIOLOGY | Facility: HOSPITAL | Age: 54
Discharge: HOME | End: 2024-05-21
Attending: ORTHOPAEDIC SURGERY
Payer: COMMERCIAL

## 2024-05-21 ENCOUNTER — PATIENT MESSAGE (OUTPATIENT)
Dept: ORTHOPEDICS | Facility: CLINIC | Age: 54
End: 2024-05-21

## 2024-05-21 VITALS — BODY MASS INDEX: 32.13 KG/M2 | HEIGHT: 76 IN | WEIGHT: 263.88 LBS

## 2024-05-21 DIAGNOSIS — M51.36 DDD (DEGENERATIVE DISC DISEASE), LUMBAR: ICD-10-CM

## 2024-05-21 DIAGNOSIS — M54.16 LUMBAR RADICULOPATHY, CHRONIC: Primary | ICD-10-CM

## 2024-05-21 PROCEDURE — 99204 OFFICE O/P NEW MOD 45 MIN: CPT | Mod: 24,S$GLB,, | Performed by: ORTHOPAEDIC SURGERY

## 2024-05-21 PROCEDURE — 99999 PR PBB SHADOW E&M-EST. PATIENT-LVL III: CPT | Mod: PBBFAC,,, | Performed by: ORTHOPAEDIC SURGERY

## 2024-05-21 PROCEDURE — 72110 X-RAY EXAM L-2 SPINE 4/>VWS: CPT | Mod: TC

## 2024-05-21 PROCEDURE — 72110 X-RAY EXAM L-2 SPINE 4/>VWS: CPT | Mod: 26,,, | Performed by: RADIOLOGY

## 2024-05-21 RX ORDER — METHYLPREDNISOLONE 4 MG/1
TABLET ORAL
Qty: 1 EACH | Refills: 0 | Status: SHIPPED | OUTPATIENT
Start: 2024-05-21 | End: 2024-06-11

## 2024-05-21 RX ORDER — GABAPENTIN 100 MG/1
100 CAPSULE ORAL 3 TIMES DAILY
Qty: 90 CAPSULE | Refills: 11 | Status: SHIPPED | OUTPATIENT
Start: 2024-05-21 | End: 2025-05-21

## 2024-05-24 ENCOUNTER — PATIENT MESSAGE (OUTPATIENT)
Dept: ADMINISTRATIVE | Facility: OTHER | Age: 54
End: 2024-05-24
Payer: COMMERCIAL

## 2024-05-30 ENCOUNTER — PATIENT MESSAGE (OUTPATIENT)
Dept: ENDOCRINOLOGY | Facility: CLINIC | Age: 54
End: 2024-05-30
Payer: COMMERCIAL

## 2024-05-30 RX ORDER — TIRZEPATIDE 5 MG/.5ML
5 INJECTION, SOLUTION SUBCUTANEOUS
Qty: 4 PEN | Refills: 2 | Status: SHIPPED | OUTPATIENT
Start: 2024-05-30

## 2024-06-04 ENCOUNTER — HOSPITAL ENCOUNTER (OUTPATIENT)
Dept: RADIOLOGY | Facility: HOSPITAL | Age: 54
Discharge: HOME | End: 2024-06-04
Attending: ORTHOPAEDIC SURGERY
Payer: COMMERCIAL

## 2024-06-04 ENCOUNTER — OFFICE VISIT (OUTPATIENT)
Dept: ORTHOPEDICS | Facility: CLINIC | Age: 54
End: 2024-06-04
Payer: COMMERCIAL

## 2024-06-04 VITALS — WEIGHT: 261.69 LBS | BODY MASS INDEX: 31.85 KG/M2

## 2024-06-04 DIAGNOSIS — Z96.651 STATUS POST RIGHT KNEE REPLACEMENT: Primary | ICD-10-CM

## 2024-06-04 DIAGNOSIS — Z96.651 STATUS POST RIGHT KNEE REPLACEMENT: ICD-10-CM

## 2024-06-04 PROCEDURE — 73562 X-RAY EXAM OF KNEE 3: CPT | Mod: 26,RT,, | Performed by: RADIOLOGY

## 2024-06-04 PROCEDURE — 99024 POSTOP FOLLOW-UP VISIT: CPT | Mod: S$GLB,,, | Performed by: ORTHOPAEDIC SURGERY

## 2024-06-04 PROCEDURE — 99999 PR PBB SHADOW E&M-EST. PATIENT-LVL IV: CPT | Mod: PBBFAC,,, | Performed by: ORTHOPAEDIC SURGERY

## 2024-06-04 PROCEDURE — 73562 X-RAY EXAM OF KNEE 3: CPT | Mod: TC,RT

## 2024-06-04 NOTE — PROGRESS NOTES
Subjective:     HPI:   Lopez Hicks is a 54 y.o. male who presents 6 weeks out from right TKA    Date of surgery: R velys TKA 4/22/24, PF tibia, cemented F+P    History of Present Illness  The patient presents for evaluation status post right knee surgery.    The patient is currently not on any medication for his knee, however, he reports discomfort in his hip, buttock, and leg, which has been ongoing for approximately 2 weeks. He is scheduled for an MRI this evening. He has been under the care of Dr. Gaviria for this condition. Post-therapy, he experiences knee soreness the following day, but currently, he does not require any pain medication and is inquiring about the possibility of swimming. He does not utilize a cane, walker, or crutches for mobility. He attends physical therapy thrice weekly at [email or web address], with approximately 6 sessions scheduled. His return to work is delayed until he can resume running, as he is employed in a chemical plant. His primary concern pertains to his sciatica, as he experiences pain when lying on his left side, particularly when his knee turns or twists. Approximately 1 to 2 weeks ago, he consulted Dr. Nikki Christiansen, who prescribed a Medrol Dosepak and gabapentin. He has completed the Medrol Dosepak and continues to take gabapentin. An epidural injection has been scheduled for him, which he reports as beneficial. He notes that certain exercises, driving, and sitting in vehicular accidents exacerbate his sciatica, a condition he has experienced previously. He underwent x-rays earlier today and has reached 130 degrees of flexion, full extension, and approximately 3 degrees off in flattening. He reports that the nerve block was beneficial. He recalls one suture that dislodged and he believes another may be present. He reports a slight noise at the top of his kneecap when bending, although he does not currently feel it. His therapist has suggested a 4 to 6 week period before  he can resume running.    Medications: none for knee, taking meds for sciatica    Assistive Devices: none    PT: ongoing 3 days a week, 6 more scheduled    Limitations: a little stiff/sore first gets moving    2 weeks ago R pre-existing sciatica flared up, ref to Jayne Gaviria JOHANA, given medrol dose pack and chepe, scheduled JOHANA    Wants to keep going to therapy says he has to be able to run to go back to work     Objective:   Body mass index is 31.85 kg/m².  Exam:    Gait: limp/antalgic none    Incision: healed    Stability:  Knee stable anterior-posterior varus and valgus stresses, no extensor lag    Extension: 3    Flexion: 130    Valgus angle: 4    Pre-op 0-120  PT 3-130    One vicryl tail removed prox incision, std care    Physical Exam        Imaging:  Indication:  Exam status post right total knee arthroplasty  Exam Ordered: Radiographs of the right knee include a standing anteroposterior view, a lateral view in full flexion, and a sunrise view  Details of Examination: Todays exam show a well fixed, well positioned total knee arthroplasty with no evidence of wear, osteolysis, or loosening.  Impression:  Status post right total knee arthroplasty, implant in good position with no abnormality      Assessment:       ICD-10-CM ICD-9-CM   1. Status post right knee replacement  Z96.651 V43.65      Doing well     Plan:       Patient is doing very well with their total knee arthroplasty.  They will continue with their routine care of the knee replacement and see me back for their follow-up at the routine interval.  If there are problems in the interim they will see me back sooner.    HS stretches  Running requirement: PT 2x/week 4 more weeks MAX, be careful not to overdo it  Assessment & Plan  1. Postoperative status following right knee surgery.  The patient is demonstrating satisfactory progress. The patient is advised to apply Neosporin or antibiotic ointment to the suture area and cover with a Band-Aid. Once the  Band-Aid is removed, the patient is advised to shower and wash the area with normal soap and water and apply the Neosporin and a Band-Aid for several days. The patient is instructed to perform hamstring stretches thrice daily, once lying on his stomach, allowing his leg to hang out for 10 to 15 minutes. The patient is encouraged to continue physical therapy twice weekly for an additional 4 weeks. Should the patient experience any redness, swelling, drainage, or any other concerning symptoms, he is advised to return for further evaluation.    Follow-up  The patient is scheduled for a follow-up visit in 6 weeks.    6 week follow up     This note was generated with the assistance of ambient listening technology. Verbal consent was obtained by the patient and accompanying visitor(s) for the recording of patient appointment to facilitate this note. I attest to having reviewed and edited the generated note for accuracy, though some syntax or spelling errors may persist. Please contact the author of this note for any clarification.      No orders of the defined types were placed in this encounter.            Past Medical History:   Diagnosis Date    Anxiety     Arthritis     Diabetes mellitus, type 2     GERD (gastroesophageal reflux disease)     Hyperlipidemia     Hypertension     Mild nonproliferative diabetic retinopathy 2015       Past Surgical History:   Procedure Laterality Date    ARTHROPLASTY, KNEE, TOTAL, USING COMPUTER-ASSISTED NAVIGATION Right 4/22/2024    Procedure: ARTHROPLASTY, KNEE, TOTAL, USING COMPUTER-ASSISTED NAVIGATION: VELYS: RIGHT: DEPUY - ATTUNE: SAME DAY;  Surgeon: Jt Carranza III, MD;  Location: [facility];  Service: Orthopedics;  Laterality: Right;    CHOLECYSTECTOMY      LUNG LOBECTOMY Right     after severe pneumonia    NASAL/SINUS ENDOSCOPY      ROTATOR CUFF REPAIR Bilateral     Dr. JERRY Cardona, and Dr. Jung.       Family History   Problem Relation Name Age of Onset    Hypertension Mother       Diabetes Father      Hyperlipidemia Father      Hypertension Father      Heart disease Father      Prostate cancer Father  60        radioactive seed    Diabetes Paternal Grandmother         Social History     Socioeconomic History    Marital status:     Number of children: 3   Occupational History    Occupation:    Tobacco Use    Smoking status: Never    Smokeless tobacco: Never   Substance and Sexual Activity    Alcohol use: Yes     Alcohol/week: 0.0 standard drinks of alcohol     Comment: beer once every 3 months    Drug use: No    Sexual activity: Yes     Partners: Female   Social History Narrative     and lives with wife .  Works at [employer].  Enjoys hunting and fishing.  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      Social Determinants of Health     Financial Resource Strain: Low Risk  (3/10/2024)    Overall Financial Resource Strain (CARDIA)     Difficulty of Paying Living Expenses: Not hard at all   Food Insecurity: No Food Insecurity (3/10/2024)    Hunger Vital Sign     Worried About Running Out of Food in the Last Year: Never true     Ran Out of Food in the Last Year: Never true   Transportation Needs: No Transportation Needs (3/10/2024)    PRAPARE - Transportation     Lack of Transportation (Medical): No     Lack of Transportation (Non-Medical): No   Physical Activity: Sufficiently Active (3/10/2024)    Exercise Vital Sign     Days of Exercise per Week: 5 days     Minutes of Exercise per Session: 90 min   Stress: No Stress Concern Present (3/10/2024)    [place] [place] [employer] - Occupational Stress Questionnaire     Feeling of Stress : Only a little   Housing Stability: Low Risk  (3/10/2024)    Housing Stability Vital Sign     Unable to Pay for Housing in the Last Year: No     Number of Places Lived in the Last Year: 1     Unstable Housing in the Last Year: No

## 2024-06-05 ENCOUNTER — PATIENT MESSAGE (OUTPATIENT)
Dept: ORTHOPEDICS | Facility: CLINIC | Age: 54
End: 2024-06-05
Payer: COMMERCIAL

## 2024-06-06 ENCOUNTER — TELEPHONE (OUTPATIENT)
Dept: PAIN MEDICINE | Facility: CLINIC | Age: 54
End: 2024-06-06
Payer: COMMERCIAL

## 2024-06-06 ENCOUNTER — TELEPHONE (OUTPATIENT)
Dept: INTERNAL MEDICINE | Facility: CLINIC | Age: 54
End: 2024-06-06
Payer: COMMERCIAL

## 2024-06-06 DIAGNOSIS — M54.16 LUMBAR RADICULOPATHY, CHRONIC: Primary | ICD-10-CM

## 2024-06-06 DIAGNOSIS — M54.16 LUMBAR RADICULOPATHY: Primary | ICD-10-CM

## 2024-06-06 NOTE — TELEPHONE ENCOUNTER
----- Message from Florence Gaviria PA-C sent at 2024  8:39 AM CDT -----  Regarding: Order for LEXA HIGGINS    Patient Name: LEXA HIGGINS([id number])  Sex: Male  : 1970      PCP: MARISELA MENDOZA    Center: [facility]     Types of orders made on 2024: Procedure Request    Order Date:2024  Ordering User:FLORENCE GAVIRIA [[id number]]  Encounter Provider:Florence Gaviria PA-C [[id number]]  Authorizing Provider: Florence Gaviria PA-C [[id number]]  Supervising Provider:ALIYAH ZENG [[id number]]  Type of Supervision:Supervision Required  Department:[facility][[id number]]    Common Order Information  Procedure -> Transforaminal Injection (Specify level and laterality) Cmt: right             L4-5 L5-S1    Order Specific Information  Order: Procedure Order to Pain Management [Custom: [id number]]  Ord  er #:          [id number]: 1 FUTURE    Priority: Routine  Class: Clinic Performed    Future Order Information      Expires on:2025            Expected by:2024                   Associated Diagnoses      M54.16 Lumbar radiculopathy, chronic      Facility Name: -> [facility]           Priority: Routine  Class: Clinic Performed    Future Order Information      Expires on:0  2025            Expected by:2024                   Associated Diagnoses      M54.16 Lumbar radiculopathy, chronic      Procedure -> Transforaminal Injection (Specify level and laterality) Cmt:                 right L4-5 L5-S1        Facility Name: -> [facility]

## 2024-06-06 NOTE — TELEPHONE ENCOUNTER
----- Message from Paula Dawn sent at 6/6/2024 11:11 AM CDT -----  Regarding: Clearance request to hold ASA  Good morning,   We have scheduled Jose Hicks for a RT L4-5 and L5-S1 Transforaminal JOHANA on 6/18 with Dr Olguin and we are requesting clearance for him to hold his ASA 5 days prior to this injection.    Thank you in advance   Paula

## 2024-06-06 NOTE — H&P (VIEW-ONLY)
Spoke with pt virtually. Pt was last seen 5/20/24 and continues to have right leg pain. Pt has tried home exercises and gabapentin with no relief. Pain is 8/10. I provided the patient with a home exercise program. It is the [facility] spine conditioning program. Exercises include head rolls, kneeling back extension, sitting rotation stretch, modified seated side straddle, knee to chest, bird dog, plank, modified seated plank, hip bridges, abdominal bracing, and abdominal crunch. Pt completes each exercise daily for one hour with worsening of pain. MRI ([facility]) demonstrates degenerative changes at L4-5 and L5-S1 with moderate neural foraminal narrowing. Will order right L4-5 L5-S1 TFESI with pain management. Pt will fu if pain persists.

## 2024-06-06 NOTE — TELEPHONE ENCOUNTER
----- Message from Florence Gaviria PA-C sent at 2024  8:39 AM CDT -----  Regarding: Order for LEXA HIGGINS    Patient Name: LEXA HIGGINS([id number])  Sex: Male  : 1970      PCP: MARISELA MENDOZA    Center: [facility]     Types of orders made on 2024: Procedure Request    Order Date:2024  Ordering User:FLORENCE GAVIRIA [[id number]]  Encounter Provider:Florence Gaviria PA-C [[id number]]  Authorizing Provider: Florence Gaviria PA-C [[id number]]  Supervising Provider:ALIYAH ZENG [[id number]]  Type of Supervision:Supervision Required  Department:[facility][[id number]]    Common Order Information  Procedure -> Transforaminal Injection (Specify level and laterality) Cmt: right             L4-5 L5-S1    Order Specific Information  Order: Procedure Order to Pain Management [Custom: [id number]]  Ord  er #:          [id number]: 1 FUTURE    Priority: Routine  Class: Clinic Performed    Future Order Information      Expires on:2025            Expected by:2024                   Associated Diagnoses      M54.16 Lumbar radiculopathy, chronic      Facility Name: -> [facility]           Priority: Routine  Class: Clinic Performed    Future Order Information      Expires on:0  2025            Expected by:2024                   Associated Diagnoses      M54.16 Lumbar radiculopathy, chronic      Procedure -> Transforaminal Injection (Specify level and laterality) Cmt:                 right L4-5 L5-S1        Facility Name: -> [facility]

## 2024-06-11 ENCOUNTER — TELEPHONE (OUTPATIENT)
Dept: PAIN MEDICINE | Facility: CLINIC | Age: 54
End: 2024-06-11
Payer: COMMERCIAL

## 2024-06-14 NOTE — PRE-PROCEDURE INSTRUCTIONS
Patient reviewed on 6/12/2024.  Okay to proceed at [facility]. The following pre-procedure instructions and arrival time have been sent to patient portal for review.  Patient replied to portal message.      Dear Jose ,     You are scheduled for a procedure with Dr. Robert Arora on 6/18/2024.       Your scheduled arrival time is 7.  This arrival time is roughly 1 hour before your anticipated procedure time to allow sufficient time for pre-op..       You will need to change into a gown for this procedure.  This procedure will take place at the [facility] at the corner of [facility] and [facility].  It is in the [facility] [place] next to [facility].  The address is:     [address] [place] [address].  [place] [id number]     After entering the building, you will proceed to the second floor where you can check in with registration. You should take any medications that you routinely take for blood pressure, heart medications, thyroid, cholesterol, etc.      The fasting restrictions are dependent on whether or not you are receiving sedation.  Sedation is not available for all procedures.      Your fasting instructions are as follow:  IV sedation. You should not eat for 8 hours and can only drink clear liquids (water or black coffee without cream/sugar) up until 2 hours before your scheduled time.  You CANNOT drive yourself and must have a .     If you are on blood thinners, you need to follow the anticoagulation instructions that had been discussed previously.  You should only stop the blood thinners if it was approved by your primary care physician or your cardiologist.  In the event that you are not able to stop your blood thinners, a blood thinner was not listed on your medication list, or we were not able to get clearance from your cardiologist, then the procedure may have to be postponed/canceled.      IF you were told to stop your blood thinners, this is how long you should  generally hold some of the more common ones.  Remember that stopping blood thinners is only necessary for certain procedures. If you are unsure of your instructions, please call us.   Aspirin - 5 days  Plavix/Clopidogrel - 7 days  Warfarin / Coumadin - 5 days  Eliquis - 3 days  Pradaxa/Dabigatran - 4 days  Xarelto/Rivaroxaban - 3 days     HOLD all non-insulin injections (shots) until after surgery (Ozempic, Mounjaro, Trulicity, Victoza, Byetta, Wegovy and Adlyxin) (Total of 7 days prior)     If you are a diabetic, do not take your medication if you will be fasting, but bring it with you. Please plan on being here for roughly 2-3 hours.     Please call us if you have been sick (running fever, having any flu-like symptoms) or have been taking antibiotics in the past 2 weeks or had any outpatient procedures other than with us (colonoscopy, endoscopy, OBGYN, dental, etc.).      If you have been previously COVID positive, you will need to hold off on your procedure until you are symptom free for 10 days. If you did not have any symptoms, you can have your procedure 10 days from your positive test result.          On the morning of your procedure:  *HOLD ALL VITAMINS, MINERALS, HERBS (INCLUDING HERBAL TEAS) AND SUPPLEMENTS  *SHOWER WITH ANTIBACTERIAL SOAP (EX. DIAL) NIGHT BEFORE AND MORNING OF PROCEDURE  *DO NOT APPLY ANY LOTIONS, OILS, POWDERS, PERFUME/COLOGNE, OINTMENTS, GELS, CREAMS, MAKEUP OR DEODORANT TO YOUR SKIN MORNING OF PROCEDURE  *LEAVE JEWELRY AND ANY VALUABLES AT HOME  *WEAR LOOSE COMFORTABLE CLOTHING (PREFERABLY A BUTTON UP SHIRT)     Please reply to this message as receipt of delivery        Thank you,  [facility] &  Maureen, LPN [facility]  Pre-Admit

## 2024-06-18 ENCOUNTER — HOSPITAL ENCOUNTER (OUTPATIENT)
Facility: HOSPITAL | Age: 54
Discharge: HOME | End: 2024-06-18
Attending: STUDENT IN AN ORGANIZED HEALTH CARE EDUCATION/TRAINING PROGRAM | Admitting: STUDENT IN AN ORGANIZED HEALTH CARE EDUCATION/TRAINING PROGRAM
Payer: COMMERCIAL

## 2024-06-18 VITALS
SYSTOLIC BLOOD PRESSURE: 120 MMHG | RESPIRATION RATE: 17 BRPM | BODY MASS INDEX: 31.66 KG/M2 | HEART RATE: 74 BPM | WEIGHT: 260 LBS | TEMPERATURE: 98 F | HEIGHT: 76 IN | DIASTOLIC BLOOD PRESSURE: 64 MMHG | OXYGEN SATURATION: 75 %

## 2024-06-18 DIAGNOSIS — M54.16 LUMBAR RADICULOPATHY: Primary | ICD-10-CM

## 2024-06-18 LAB — POCT GLUCOSE: 149 MG/DL (ref 70–110)

## 2024-06-18 PROCEDURE — 64483 NJX AA&/STRD TFRM EPI L/S 1: CPT | Mod: RT | Performed by: STUDENT IN AN ORGANIZED HEALTH CARE EDUCATION/TRAINING PROGRAM

## 2024-06-18 PROCEDURE — 63600175 PHARM REV CODE 636 W HCPCS: Performed by: STUDENT IN AN ORGANIZED HEALTH CARE EDUCATION/TRAINING PROGRAM

## 2024-06-18 PROCEDURE — 25500020 PHARM REV CODE 255: Performed by: STUDENT IN AN ORGANIZED HEALTH CARE EDUCATION/TRAINING PROGRAM

## 2024-06-18 PROCEDURE — 64483 NJX AA&/STRD TFRM EPI L/S 1: CPT | Mod: RT,,, | Performed by: STUDENT IN AN ORGANIZED HEALTH CARE EDUCATION/TRAINING PROGRAM

## 2024-06-18 PROCEDURE — 64484 NJX AA&/STRD TFRM EPI L/S EA: CPT | Mod: RT,,, | Performed by: STUDENT IN AN ORGANIZED HEALTH CARE EDUCATION/TRAINING PROGRAM

## 2024-06-18 PROCEDURE — 64484 NJX AA&/STRD TFRM EPI L/S EA: CPT | Mod: RT | Performed by: STUDENT IN AN ORGANIZED HEALTH CARE EDUCATION/TRAINING PROGRAM

## 2024-06-18 PROCEDURE — 25000003 PHARM REV CODE 250: Performed by: STUDENT IN AN ORGANIZED HEALTH CARE EDUCATION/TRAINING PROGRAM

## 2024-06-18 RX ORDER — LIDOCAINE HYDROCHLORIDE 20 MG/ML
INJECTION, SOLUTION EPIDURAL; INFILTRATION; INTRACAUDAL; PERINEURAL
Status: DISCONTINUED | OUTPATIENT
Start: 2024-06-18 | End: 2024-06-18 | Stop reason: HOSPADM

## 2024-06-18 RX ORDER — FENTANYL CITRATE 50 UG/ML
INJECTION, SOLUTION INTRAMUSCULAR; INTRAVENOUS
Status: DISCONTINUED | OUTPATIENT
Start: 2024-06-18 | End: 2024-06-18 | Stop reason: HOSPADM

## 2024-06-18 RX ORDER — LIDOCAINE HYDROCHLORIDE 10 MG/ML
INJECTION, SOLUTION EPIDURAL; INFILTRATION; INTRACAUDAL; PERINEURAL
Status: DISCONTINUED | OUTPATIENT
Start: 2024-06-18 | End: 2024-06-18 | Stop reason: HOSPADM

## 2024-06-18 RX ORDER — SODIUM CHLORIDE 9 MG/ML
500 INJECTION, SOLUTION INTRAVENOUS CONTINUOUS
Status: DISCONTINUED | OUTPATIENT
Start: 2024-06-18 | End: 2024-06-18 | Stop reason: HOSPADM

## 2024-06-18 RX ORDER — MIDAZOLAM HYDROCHLORIDE 1 MG/ML
INJECTION INTRAMUSCULAR; INTRAVENOUS
Status: DISCONTINUED | OUTPATIENT
Start: 2024-06-18 | End: 2024-06-18 | Stop reason: HOSPADM

## 2024-06-18 RX ORDER — DEXAMETHASONE SODIUM PHOSPHATE 10 MG/ML
INJECTION INTRAMUSCULAR; INTRAVENOUS
Status: DISCONTINUED | OUTPATIENT
Start: 2024-06-18 | End: 2024-06-18 | Stop reason: HOSPADM

## 2024-06-18 NOTE — PLAN OF CARE
VSS. Consent and site dany pending. All patient concerns addressed. Patient's daughter available for ride home. Patient belongings placed behind stretcher. Pre procedure complete. Call light in reach of patient. Side rails up x2.

## 2024-06-18 NOTE — DISCHARGE INSTRUCTIONS
[facility] [place]  [facility] # [phone]    POST-PROCEDURE INSTRUCTIONS:    Today you had an injection that included a steroid medications.  The steroid may or may not have been mixed with a local anesthetic when it was injected.   If the injection was in the neck, you may feel some pressure, numbness, or slight weakness in the arm after the procedure for a short period of time (this is a normal response), if this persists for longer than 1 day please contact our office or go to the emergency room.  If the injection was in the low back, you may feel some pressure, numbness, or slight weakness in the leg after the procedure for a short period of time (this is a normal response), if this persists for longer than 1 day please contact our office or go to the emergency room.  You may get side effects from the steroid.  This is not uncommon.  Symptoms include: elevated blood sugar, elevated blood pressure, headache, flushing, nausea, insomnia.  These symptoms are transient and will resolve within 1-3 days.  If symptoms last longer than this please contact our office or head to the emergency room.  Steroid medications can take anywhere from 3-14 days to take effect (rarely longer).  You may notice that your pain worsens for a short period of time after the injection, this would not be unusual due to the pressure and trauma from the needle.    If you do not have a follow up appointment scheduled, please contact my office (or the office of the physician who referred you for the procedure) to get a post-procedure follow up scheduled 2-4 weeks after the procedure.  This can be done as a virtual visit if that is more convenient for you.      What you need to do:    Keep a record of your response to the injection you had today.    How much relief did you get?   When did the relief start and how long did it last?  Were you able to decrease the use of any of your pain  medications?  Were you able to increase your level of activity?  How long did the relief last?    What to watch out for:    If you experience any of the following symptoms after your procedure, please notify the messaging service immediately (see above for contact information):   fever (increased oral temperature)   bleeding or swelling at the injection site,    drainage, rash or redness at the injection site    possible signs of infection    increased pain at the injection site   worsening of your usual pain   severe headache   new or worsening numbness    new arm and/or leg weakness, or    changes in bowel and/or bladder function: urinating or defecating on yourself and not knowing that you did it.    PLEASE FOLLOW ALL INSTRUCTIONS CAREFULLY     Do not engage in strenuous activity (e.g., lifting or pushing heavy objects or repeated bending) for 24 hours.     Do not take a bath, swim or use Jacuzzi for 24 hours after procedure. (A shower is fine).   Remove any Band-Aids when you get home.    Use cold/ice, as needed for comfort.  We recommend the use of cold therapy alternating on for 20 minutes, off for 20 minutes.    Do not apply direct heat (heating pad or heat packs) to the injection site for 24 hours.     Resume your usual medications, unless instructed otherwise by your Pain Physician.     If you are on warfarin (Coumadin) or other blood thinner, resume this medication as instructed by your prescribing Physician.    IF AT ANY POINT YOU ARE VERY CONCERNED ABOUT YOUR SYMPTOMS, PLEASE GO TO THE EMERGENCY ROOM.    If you develop worsening pain, weakness, numbness, lose bowel or bladder control (i.e., having an accident where you did not even know you had to go to the bathroom and suddenly noticed you soiled yourself), saddle anesthesia (a loss of sensation restricted to the area of the buttocks, anus and between the legs -- i.e., those parts of your body that would touch a saddle if you were sitting on one) you  need to go immediately to the emergency department for evaluation and treatment.    ----------------------------------------------------------------------------------------------------------------------------------------------------------------  If you received Sedation please read the following instructions:  POST SEDATION INSTRUCTIONS    Today you received intravenous medication (also known as sedation) that was used to help you relax and/or decrease discomfort during your procedure. This medication will be acting in your body for the next 24 hours, so you might feel a little tired or sleepy. This feeling will slowly wear off.   Common side effects associated with these medications include: drowsiness, dizziness, sleepiness, confusion, feeling excited, difficulty remembering things, lack of steadiness with walking or balance, loss of fine muscle control, slowed reflexes, difficulty focusing, and blurred vision.  Some over-the-counter and prescription medications (e.g., muscle relaxants, opioids, mood-altering medications, sedatives/hypnotics, antihistamines) can interact with the intravenous medication you received and cause an increased risk of the side effects listed above in addition to other potentially life threatening side effects. Use extreme caution if you are taking such medications, and consult with your Pain Physician or prescribing physician if you have any questions.  For the next 12-24 hours:    DO NOT--Drive a car, operate machinery or power tools   DO NOT--Drink any alcoholic beverages (not even beer), they may dangerously increase the risk of side effects.    DO NOT--Make any important legal or business decisions or sign important documents.  We advise you to have someone to assist you at home. Move slowly and carefully. Do not make sudden changes in position. Be aware of dizziness or light-headedness and move accordingly.   If you seek medical treatment within 24 hours, let the nurse or doctor  caring for you know that you have received the above medications. If you have any questions or concerns related to your sedation or treatment today please contact us.

## 2024-06-18 NOTE — DISCHARGE SUMMARY
[facility]  Discharge Note  Short Stay    Procedure(s) (LRB):  RT L4-5 and L5-S1 TFESI (Right)      OUTCOME: Patient tolerated treatment/procedure well without complication and is now ready for discharge.    DISPOSITION: Home or Self Care    FINAL DIAGNOSIS:  <principal problem not specified>    FOLLOWUP: In clinic    DISCHARGE INSTRUCTIONS:  No discharge procedures on file.     TIME SPENT ON DISCHARGE: 10 minutes

## 2024-06-18 NOTE — OP NOTE
"PROCEDURE: right Lumbar L4-5 and L5-S1 Transforaminal Epidural Steroid Injection    Patient Name: Lopez Hicks  MRN: [id number]    PROCEDURE DATE: 6/18/2024    INJECTION # 1    DIAGNOSIS: Lumbar Radiculopathy  CPT CODE: 44968 (INJECTION(S), ANESTHETIC AGENT(S) AND/OR STEROID; TRANSFORAMINAL EPIDURAL, WITH IMAGING GUIDANCE (FLUOROSCOPY OR CT), LUMBAR OR SACRAL, SINGLE LEVEL), 89561 (Each additional level).     POSTPROCEDURE DIAGNOSIS: Same    PHYSICIAN: Robert Arora DO  NEEDLE TYPE: - 22G 5" Spinal Needle  MEDICATIONS INJECTED: 6ml mixture of 1ml Dexamethasone 10mg/ml and 5ml 1% lidocaine PF split equally between each site.  CONTRAST: Omni 300    Sedation Medications - Mild Sedation with 2mg Versed and 25mcg Fentanyl    Estimated Blood Loss - <2ml  Drains: None  Specimens Removed: None  Urine Output - Not Measured  Complications: None  Outcome: Good    Informed Consent:  The patient's condition and proposed procedures, risks, and alternatives were discussed with the patient or responsible party.  The patient's / responsible party's questions were answered.   The patient / responsible party appeared to understand and chose to proceed.  Informed consent was obtained.  After obtaining written consent, an IV hep lock was placed. (See nurses notes for details).     Procedure in Detail:  The patient was taken back to the OR suite and placed in a prone position. The skin overlying the injection site was prepped and draped in an aseptic fashion. The target injection site (see above) was identified with fluoroscopy.     Procedural Pause:  A procedural pause verifying correct patient, medical record number, allergies, medications to be administered, current vital signs, and surgical site was performed immediately prior to beginning the procedure.    The skin and subcutaneous tissue overlying the target site(s) of injection for the L4-5 and L5-S1 transforaminal epidural steroid injection was/were anesthetized " using 4 mL of 1% lidocaine with a 25-gauge, 1½-inch needle.      The fluoroscopic beam was aligned to create a tunnel view.  The above noted needle was advanced parallel to the fluoroscopic beam towards the above noted foramen under fluoroscopic guidance.  The final position of the needle(s) was identified using AP and lateral views.  Paresthesias were not noted with final needle positioning.       A microbore extension tubing was attached to the needle to minimize any movement of the needle during injection or syringe change.  After negative aspiration for heme or CSF at each site(s) where the needle(s) was placed, 0.5ml of contrast dye was injected to confirm appropriate placement and that there was no vascular uptake.  Pain provocation by the injected contrast material was not noted.  Then the injectate solution described above was injected in increments.  The needle was then retracted approximately FPC and the needle track was flushed with 0.5 mL of Lidocaine 1%.  The needle(s) was then removed.     The same procedural technique outlined above was not repeated on the OPPOSITE side.    The heart rate, pulse oximetry, and blood pressure were continuously monitored throughout the procedure.  The procedure was well tolerated. He was carefully escorted to the recovery room in stable condition. Patient was monitored by RN for recovery period.  The patient will be contacted in the next few days to determine extent of relief.  Patient was given post procedure and discharge instructions to follow at home.  The patient was discharged in a stable condition.    Note Electronically Signed By:  Robert Juarez  06/18/2024

## 2024-06-19 RX ORDER — PANTOPRAZOLE SODIUM 40 MG/1
40 TABLET, DELAYED RELEASE ORAL DAILY
Qty: 30 TABLET | Refills: 0 | Status: SHIPPED | OUTPATIENT
Start: 2024-06-19 | End: 2024-07-19

## 2024-06-22 ENCOUNTER — PATIENT MESSAGE (OUTPATIENT)
Dept: ADMINISTRATIVE | Facility: OTHER | Age: 54
End: 2024-06-22
Payer: COMMERCIAL

## 2024-06-25 LAB
LEFT EYE DM RETINOPATHY: POSITIVE
RIGHT EYE DM RETINOPATHY: POSITIVE

## 2024-07-03 ENCOUNTER — PATIENT MESSAGE (OUTPATIENT)
Dept: ORTHOPEDICS | Facility: CLINIC | Age: 54
End: 2024-07-03
Payer: COMMERCIAL

## 2024-07-05 ENCOUNTER — OFFICE VISIT (OUTPATIENT)
Dept: ENDOCRINOLOGY | Facility: CLINIC | Age: 54
End: 2024-07-05
Payer: COMMERCIAL

## 2024-07-05 DIAGNOSIS — E78.5 HYPERLIPIDEMIA, UNSPECIFIED HYPERLIPIDEMIA TYPE: ICD-10-CM

## 2024-07-05 DIAGNOSIS — Z79.4 TYPE 2 DIABETES MELLITUS WITH DIABETIC POLYNEUROPATHY, WITH LONG-TERM CURRENT USE OF INSULIN: Primary | ICD-10-CM

## 2024-07-05 DIAGNOSIS — E11.42 TYPE 2 DIABETES MELLITUS WITH DIABETIC POLYNEUROPATHY, WITH LONG-TERM CURRENT USE OF INSULIN: Primary | ICD-10-CM

## 2024-07-05 DIAGNOSIS — E66.9 NON MORBID OBESITY, UNSPECIFIED OBESITY TYPE: ICD-10-CM

## 2024-07-05 RX ORDER — INSULIN LISPRO 100 [IU]/ML
20-30 INJECTION, SOLUTION INTRAVENOUS; SUBCUTANEOUS
Qty: 90 ML | Refills: 2 | Status: SHIPPED | OUTPATIENT
Start: 2024-07-05

## 2024-07-05 RX ORDER — EMPAGLIFLOZIN, METFORMIN HYDROCHLORIDE 12.5; 1 MG/1; MG/1
TABLET, EXTENDED RELEASE ORAL
Qty: 180 TABLET | Refills: 2 | Status: SHIPPED | OUTPATIENT
Start: 2024-07-05

## 2024-07-05 NOTE — PROGRESS NOTES
CC: This 54 y.o. White male  is here for evaluation of  T2DM along with comorbidities indicated in the Visit Diagnosis section of this encounter.    HPI: Lopez Hicks was diagnosed with T2DM in early 20s, diagnosed upon routine labs.         Prior visit 4/2/24  Last seen several months ago. Has long h/o poor f/u. Pt cites difficulty with keeping his appts d/t heavy workload.   A1c is up from 6.7% in July to 7.1% in February.   90 day CGM average is 185.   Pt increased Mounjaro to 10 mg after lov. It's helped cut his appetite. He stopped taking Tresiba because he felt his BGs were controlled. However, review of CGM data earlier this year showed CGM average around 180.   Pt was  been given Mounjaro 7.5 mg the last 2 times from pharmacy. Has 2 pens left.   Pt increased Humalog dose from 20 to 40 units ac when Mounjaro was lowered.  Then he ran out of Humalog last week and now cannot get it refilled till April 25th.  Since he has no Humalog, he just resumed Tresiba.   He took Tresiba 40 units in the AM and later 20 units. Yesterday he took 40  units bid.   Plan   Start Synjardy (combination Jardiance and metformin) 1 tablet twice daily (equals same dose of metformin).   If unable to get Synjardy approved, contact office so that Jardiance prescription can be sent.   Finish out Mounjaro 7.5 mg then increase Mounjaro back to 10 mg once weekly.   Reduce Tresiba to 30 units once daily - starting tomorrow morning.   Continue Humalog at 20 units before each meal.   Prescription for Humalog sent to [facility]. If unable to get Humalog filled, then you can use Novolin R over the counter insulin from [facility], without a prescription.   If glucoses are consistently less than 80 overnight, then reduce Tresiba from 30 to 24 units once daily.   Continue pioglitazone.   Return to clinic in 3 months with labs prior. Virtual visit.      Interval hx virtual   A1c is up from 7.1% in Feb to now 7.3%.   Mounjaro was  reduced to 5 mg last month per pt's request. He c/o burping when he resumed Mounjaro at 7.5 mg after holding it x 3 weeks d/t sx in April. He also took steroids x 1 week for sciatica. He also had an epidural steroid injection on 6/18.  Glucoses were high as well as appetite while he was off Mounjaro.     He has taken Mounjaro 5 mg for the last 2 weeks. Injecting Humalog 30 units BID   90 day CGM average 195    LAST DIABETES EDUCATION:     HOSPITALIZED FOR DIABETES -  No.     DIABETES MEDICATIONS:   Tresiba  30 units every morning   Humalog 20 units ac   Pioglitazone 15 mg once daily   Synjardy XR 12.5-1000 mg bid   Mounjaro 10 mg weekly on Mondays     Misses medication doses - Mounjaro as above       Injecting to abdomen.     DM COMPLICATIONS: peripheral neuropathy    SIGNIFICANT DIABETES MED HISTORY:   Trulicity 0.75 mg - nausea and bloating   Ozempic - acid reflux   Pioglitaonze 15 mg - edema to BLE     SELF MONITORING BLOOD GLUCOSE: monitors with Dexcom G6 CGM. See Media for CGM report.     CGM interpretation: hyperglycemia secondary to lack of Mounjaro, improving with time on the CGM report with most recent glucoses mostly at goal. No hypoglycemia.                HYPOGLYCEMIC EPISODES: rare      CURRENT DIET: drinks unsweet tea and lemonade with AS. No soft drinks. Eats 2-3 meals/day, sometimes skips breakfast.  Snacking less, smaller portions.         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 Daughter is vegan.       There were no vitals taken for this visit.      ROS:   CONSTITUTIONAL: Appetite good, denies fatigue           PHYSICAL EXAM:  GENERAL: Well developed, well nourished. No acute distress.   PSYCH: AAOx3, appropriate mood and affect, conversant, well-groomed. Judgement and insight good.            Hemoglobin A1C   Date Value Ref Range Status   06/28/2024  7.3 (H) 4.0 - 5.6 % Final     Comment:     ADA Screening Guidelines:  5.7-6.4%  Consistent with prediabetes  >or=6.5%  Consistent with diabetes    High levels of fetal hemoglobin interfere with the HbA1C  assay. Heterozygous hemoglobin variants (HbS, HgC, etc)do  not significantly interfere with this assay.   However, presence of multiple variants may affect accuracy.     02/21/2024 7.1 (H) 4.0 - 5.6 % Final     Comment:     ADA Screening Guidelines:  5.7-6.4%  Consistent with prediabetes  >or=6.5%  Consistent with diabetes    High levels of fetal hemoglobin interfere with the HbA1C  assay. Heterozygous hemoglobin variants (HbS, HgC, etc)do  not significantly interfere with this assay.   However, presence of multiple variants may affect accuracy.     11/22/2023 7.0 (H) 4.0 - 5.6 % Final     Comment:     ADA Screening Guidelines:  5.7-6.4%  Consistent with prediabetes  >or=6.5%  Consistent with diabetes    High levels of fetal hemoglobin interfere with the HbA1C  assay. Heterozygous hemoglobin variants (HbS, HgC, etc)do  not significantly interfere with this assay.   However, presence of multiple variants may affect accuracy.           Component Value Date/Time     03/22/2024 1130    K 3.9 03/22/2024 1130     03/22/2024 1130    CO2 28 03/22/2024 1130    BUN 16 03/22/2024 1130    CREATININE 1.2 03/22/2024 1130     (H) 03/22/2024 1130    CALCIUM 10.3 03/22/2024 1130    ALKPHOS 54 (L) 03/22/2024 1130    AST 20 03/22/2024 1130    ALT 25 03/22/2024 1130    BILITOT 0.2 03/22/2024 1130    EGFRNORACEVR >60.0 03/22/2024 1130    ESTGFRAFRICA >60.0 04/08/2022 0723           Lab Results   Component Value Date    CHOL 148 11/22/2023    CHOL 141 11/14/2022    CHOL 157 07/06/2022     Lab Results   Component Value Date    HDL 35 (L) 11/22/2023    HDL 34 (L) 11/14/2022    HDL 32 (L) 07/06/2022     Lab Results   Component Value Date    LDLCALC 75.0 11/22/2023    LDLCALC 42.6 (L) 11/14/2022    LDLCALC Invalid,  Trig>400.0 07/06/2022     Lab Results   Component Value Date    TRIG 190 (H) 11/22/2023    TRIG 322 (H) 11/14/2022    TRIG 420 (H) 07/06/2022       Lab Results   Component Value Date    CHOLHDL 23.6 11/22/2023    CHOLHDL 24.1 11/14/2022    CHOLHDL 20.4 07/06/2022         Lab Results   Component Value Date    MICALBCREAT 29.4 11/22/2023         ASSESSMENT and PLAN:    A1C GOAL: < 7 %     1. Type 2 diabetes mellitus with diabetic polyneuropathy, with long-term current use of insulin  Finish out Mounjaro 5 mg supply then increase to 7.5 mg weekly.   Continue Tresiba, Humalog, Synjardy and pioglitazone.   May need to decrease Humalog from 30 to 20 units when Mounjaro is increased to 7.5 mg.   Return to clinic in 3 months with labs prior, virtual visit.           insulin lispro (HUMALOG KWIKPEN INSULIN) 100 unit/mL pen    Hemoglobin A1C      2. Non morbid obesity, unspecified obesity type  Increases insulin resistance.         3. Hyperlipidemia, unspecified hyperlipidemia type  Continue pravastatin.                No orders of the defined types were placed in this encounter.       No follow-ups on file.       The patient location is: [place]   The chief complaint leading to consultation is: type 2 diabetes    Visit type: audiovisual    Face to Face time with patient: 20  25 minutes of total time spent on the encounter, which includes face to face time and non-face to face time preparing to see the patient (eg, review of tests), Obtaining and/or reviewing separately obtained history, Documenting clinical information in the electronic or other health record, Independently interpreting results (not separately reported) and communicating results to the patient/family/caregiver, or Care coordination (not separately reported).         Each patient to whom he or she provides medical services by telemedicine is:  (1) informed of the relationship between the physician and patient and the respective role of any other health care  provider with respect to management of the patient; and (2) notified that he or she may decline to receive medical services by telemedicine and may withdraw from such care at any time.    Notes:

## 2024-07-05 NOTE — PATIENT INSTRUCTIONS
Finish out Mounjaro 5 mg supply then increase to 7.5 mg weekly.   Continue Tresiba, Humalog, Synjardy and pioglitazone.   May need to decrease Humalog from 30 to 20 units when Mounjaro is increased to 7.5 mg.   Return to clinic in 3 months with labs prior, virtual visit.

## 2024-07-09 ENCOUNTER — TELEPHONE (OUTPATIENT)
Dept: PAIN MEDICINE | Facility: CLINIC | Age: 54
End: 2024-07-09
Payer: COMMERCIAL

## 2024-07-09 DIAGNOSIS — M54.16 LUMBAR RADICULOPATHY, CHRONIC: Primary | ICD-10-CM

## 2024-07-09 RX ORDER — PANTOPRAZOLE SODIUM 40 MG/1
40 TABLET, DELAYED RELEASE ORAL DAILY
Qty: 30 TABLET | Refills: 0 | OUTPATIENT
Start: 2024-07-09 | End: 2024-08-08

## 2024-07-09 NOTE — H&P (VIEW-ONLY)
Spoke with pt virtually. Pt was last seen 6/6/24 and continues to have right leg pain. Pt has tried home exercises and gabapentin with no relief. Pain is 8/10. I provided the patient with a home exercise program. It is the [facility] spine conditioning program. Exercises include head rolls, kneeling back extension, sitting rotation stretch, modified seated side straddle, knee to chest, bird dog, plank, modified seated plank, hip bridges, abdominal bracing, and abdominal crunch. Pt completes each exercise daily for one hour with worsening of pain. [facility] demonstrates degenerative changes at L4-5 and L5-S1 with moderate neural foraminal narrowing. pt had a right L4-5 L5-S1 TFESI with pain management on 6/18/24 with minimal relief. Will order L5-S1 IL JOHANA with pain management. This is a different injection than the one done on 6/18/24. Pt will fu if pain persists.

## 2024-07-09 NOTE — PROGRESS NOTES
Spoke with pt virtually. Pt was last seen 6/6/24 and continues to have right leg pain. Pt has tried home exercises and gabapentin with no relief. Pain is 8/10. I provided the patient with a home exercise program. It is the [facility] spine conditioning program. Exercises include head rolls, kneeling back extension, sitting rotation stretch, modified seated side straddle, knee to chest, bird dog, plank, modified seated plank, hip bridges, abdominal bracing, and abdominal crunch. Pt completes each exercise daily for one hour with worsening of pain. [facility] demonstrates degenerative changes at L4-5 and L5-S1 with moderate neural foraminal narrowing. pt had a right L4-5 L5-S1 TFESI with pain management on 6/18/24 with minimal relief. Will order L5-S1 IL JOHANA with pain management. This is a different injection than the one done on 6/18/24. Pt will fu if pain persists.

## 2024-07-09 NOTE — TELEPHONE ENCOUNTER
----- Message from Florence Gaviria PA-C sent at 2024  3:34 PM CDT -----  Regarding: Order for LEXA HIGGINS    Patient Name: LEXA HIGGINS([id number])  Sex: Male  : 1970      PCP: MARISELA MENDOZA    Center: [facility]     Types of orders made on 2024: Procedure Request    Order Date:2024  Ordering User:FLORENCE GAVIRIA [[id number]]  Encounter Provider:Florence Gaviria PA-C [[id number]]  Authorizing Provider: Florence Gaviria PA-C [[id number]]  Supervising Provider:ALIYAH ZENG [[id number]]  Type of Supervision:Supervision Required  Department:[facility][[id number]]    Common Order Information  Procedure -> Epidural Injection (specify level) Cmt: L5-S1 (AIM RIGHT)    Order Specific Information  Order: Procedure Order to Pain Management [Custom: [id number]]  Order #:          [id number]:   1 FUTURE    Priority: Routine  Class: Clinic Performed    Future Order Information      Expires on:2025            Expected by:2024                   Associated Diagnoses      M54.16 Lumbar radiculopathy, chronic      Facility Name: -> [facility]           Priority: Routine  Class: Clinic Performed    Future Order Information      Expires on:2025            Expecte  d by:2024                   Associated Diagnoses      M54.16 Lumbar radiculopathy, chronic      Procedure -> Epidural Injection (specify level) Cmt: L5-S1 (AIM RIGHT)        Facility Name: -> [facility]

## 2024-07-12 NOTE — PRE-PROCEDURE INSTRUCTIONS
Unable to reach pt via phone.  Left voicemail with arrival time also informing pt of need for responsible  accompaniment and instructing pt to follow pre-procedure instructions provided via [facility] portal.  The following message was sent to pt's portal.      Dear Jose ,     You are scheduled for a procedure with Dr. Robert Arora on 7/16/2024.       Your scheduled arrival time is 12:00 pm.  This arrival time is roughly 1 hour before your anticipated procedure time to allow sufficient time for pre-op..       You will need to change into a gown for this procedure.  This procedure will take place at the [facility] at the corner of [facility] and [facility].  [facility] to [facility].  The address is:     [address] [place] [address].  [place] [id number]     After entering the building, you will proceed to the second floor where you can check in with registration. You should take any medications that you routinely take for blood pressure, heart medications, thyroid, cholesterol, etc.      The fasting restrictions are dependent on whether or not you are receiving sedation.  Sedation is not available for all procedures.      Your fasting instructions are as follow:  Oral Sedation. You do not need to fast before this procedure.  You can eat and drink like normal.  You CANNOT drive yourself and must have a .     If you are on blood thinners, you need to follow the anticoagulation instructions that had been discussed previously.  You should only stop the blood thinners if it was approved by your primary care physician or your cardiologist.  In the event that you are not able to stop your blood thinners, a blood thinner was not listed on your medication list, or we were not able to get clearance from your cardiologist, then the procedure may have to be postponed/canceled.      IF you were told to stop your blood thinners, this is how long you should  generally hold some of the more common ones.  Remember that stopping blood thinners is only necessary for certain procedures. If you are unsure of your instructions, please call us.   Aspirin - 5 days  Plavix/Clopidogrel - 7 days  Warfarin / Coumadin - 5 days  Eliquis - 3 days  Pradaxa/Dabigatran - 4 days  Xarelto/Rivaroxaban - 3 days     If you are a diabetic, do not take your medication if you will be fasting, but bring it with you. Please plan on being here for roughly 2-3 hours.     Please call us if you have been sick (running fever, having any flu-like symptoms) or have been taking antibiotics in the past 2 weeks or had any outpatient procedures other than with us (colonoscopy, endoscopy, OBGYN, dental, etc.).      If you have been previously COVID positive, you will need to hold off on your procedure until you are symptom free for 10 days. If you did not have any symptoms, you can have your procedure 10 days from your positive test result.          On the morning of your procedure:  *HOLD ALL VITAMINS, MINERALS, HERBS (INCLUDING HERBAL TEAS) AND SUPPLEMENTS  *SHOWER WITH ANTIBACTERIAL SOAP (EX. DIAL) NIGHT BEFORE AND MORNING OF PROCEDURE  *DO NOT APPLY ANY LOTIONS, OILS, POWDERS, PERFUME/COLOGNE, OINTMENTS, GELS, CREAMS, MAKEUP OR DEODORANT TO YOUR SKIN MORNING OF PROCEDURE  *LEAVE JEWELRY AND ANY VALUABLES AT HOME  *WEAR LOOSE COMFORTABLE CLOTHING (PREFERABLY A BUTTON UP SHIRT)     Please reply to this message as receipt of delivery        Thank you,  [facility]  [place], LPN [facility]  Pre-Admit

## 2024-07-16 ENCOUNTER — HOSPITAL ENCOUNTER (OUTPATIENT)
Dept: RADIOLOGY | Facility: HOSPITAL | Age: 54
Discharge: HOME | End: 2024-07-16
Attending: NURSE PRACTITIONER
Payer: COMMERCIAL

## 2024-07-16 ENCOUNTER — OFFICE VISIT (OUTPATIENT)
Dept: ORTHOPEDICS | Facility: CLINIC | Age: 54
End: 2024-07-16
Payer: COMMERCIAL

## 2024-07-16 ENCOUNTER — HOSPITAL ENCOUNTER (OUTPATIENT)
Facility: HOSPITAL | Age: 54
Discharge: HOME | End: 2024-07-16
Attending: STUDENT IN AN ORGANIZED HEALTH CARE EDUCATION/TRAINING PROGRAM | Admitting: STUDENT IN AN ORGANIZED HEALTH CARE EDUCATION/TRAINING PROGRAM
Payer: COMMERCIAL

## 2024-07-16 VITALS
SYSTOLIC BLOOD PRESSURE: 164 MMHG | HEART RATE: 85 BPM | BODY MASS INDEX: 31.66 KG/M2 | RESPIRATION RATE: 18 BRPM | OXYGEN SATURATION: 98 % | TEMPERATURE: 98 F | DIASTOLIC BLOOD PRESSURE: 74 MMHG | WEIGHT: 260 LBS | HEIGHT: 76 IN

## 2024-07-16 DIAGNOSIS — M54.16 LUMBAR RADICULOPATHY: Primary | ICD-10-CM

## 2024-07-16 DIAGNOSIS — Z96.651 S/P TOTAL KNEE REPLACEMENT, RIGHT: Primary | ICD-10-CM

## 2024-07-16 DIAGNOSIS — Z98.890 S/P RIGHT KNEE ARTHROSCOPY: ICD-10-CM

## 2024-07-16 DIAGNOSIS — Z98.890 S/P RIGHT KNEE ARTHROSCOPY: Primary | ICD-10-CM

## 2024-07-16 LAB — POCT GLUCOSE: 156 MG/DL (ref 70–110)

## 2024-07-16 PROCEDURE — 82962 GLUCOSE BLOOD TEST: CPT | Performed by: STUDENT IN AN ORGANIZED HEALTH CARE EDUCATION/TRAINING PROGRAM

## 2024-07-16 PROCEDURE — 63600175 PHARM REV CODE 636 W HCPCS: Performed by: STUDENT IN AN ORGANIZED HEALTH CARE EDUCATION/TRAINING PROGRAM

## 2024-07-16 PROCEDURE — 25500020 PHARM REV CODE 255: Performed by: STUDENT IN AN ORGANIZED HEALTH CARE EDUCATION/TRAINING PROGRAM

## 2024-07-16 PROCEDURE — 62323 NJX INTERLAMINAR LMBR/SAC: CPT | Performed by: STUDENT IN AN ORGANIZED HEALTH CARE EDUCATION/TRAINING PROGRAM

## 2024-07-16 PROCEDURE — 25000003 PHARM REV CODE 250: Performed by: STUDENT IN AN ORGANIZED HEALTH CARE EDUCATION/TRAINING PROGRAM

## 2024-07-16 PROCEDURE — 73560 X-RAY EXAM OF KNEE 1 OR 2: CPT | Mod: 26,RT,, | Performed by: RADIOLOGY

## 2024-07-16 PROCEDURE — 73560 X-RAY EXAM OF KNEE 1 OR 2: CPT | Mod: TC,RT

## 2024-07-16 PROCEDURE — 99024 POSTOP FOLLOW-UP VISIT: CPT | Mod: S$GLB,,, | Performed by: NURSE PRACTITIONER

## 2024-07-16 PROCEDURE — 99999 PR PBB SHADOW E&M-EST. PATIENT-LVL IV: CPT | Mod: PBBFAC,,, | Performed by: NURSE PRACTITIONER

## 2024-07-16 PROCEDURE — 62323 NJX INTERLAMINAR LMBR/SAC: CPT | Mod: ,,, | Performed by: STUDENT IN AN ORGANIZED HEALTH CARE EDUCATION/TRAINING PROGRAM

## 2024-07-16 RX ORDER — LIDOCAINE HYDROCHLORIDE 20 MG/ML
INJECTION, SOLUTION EPIDURAL; INFILTRATION; INTRACAUDAL; PERINEURAL
Status: DISCONTINUED | OUTPATIENT
Start: 2024-07-16 | End: 2024-07-16 | Stop reason: HOSPADM

## 2024-07-16 RX ORDER — DEXAMETHASONE SODIUM PHOSPHATE 10 MG/ML
INJECTION INTRAMUSCULAR; INTRAVENOUS
Status: DISCONTINUED | OUTPATIENT
Start: 2024-07-16 | End: 2024-07-16 | Stop reason: HOSPADM

## 2024-07-16 RX ORDER — ALPRAZOLAM 0.5 MG/1
1 TABLET, ORALLY DISINTEGRATING ORAL ONCE AS NEEDED
Status: COMPLETED | OUTPATIENT
Start: 2024-07-16 | End: 2024-07-16

## 2024-07-16 RX ADMIN — ALPRAZOLAM 1 MG: 0.5 TABLET, ORALLY DISINTEGRATING ORAL at 01:07

## 2024-07-16 NOTE — PLAN OF CARE
Chart reviewed. Preop nursing care completed per orders. Safe surgery checklist complete. Pt denies any open wounds cuts or sores. Pt denies any metal in body. Belongings on stretcher. Waiting for surgical consent and  site marking prior to surgery. Pt AAOX4, VSS on room air. Pt toileted, Bed locked in lowest position, Call light within reach. Pt denies any needs at this time.

## 2024-07-16 NOTE — DISCHARGE INSTRUCTIONS
[facility]  "[employer] service # [phone]    POST-PROCEDURE INSTRUCTIONS:    Today you had an injection that included a steroid medications.  The steroid may or may not have been mixed with a local anesthetic when it was injected.   If the injection was in the neck, you may feel some pressure, numbness, or slight weakness in the arm after the procedure for a short period of time (this is a normal response), if this persists for longer than 1 day please contact our office or go to the emergency room.  If the injection was in the low back, you may feel some pressure, numbness, or slight weakness in the leg after the procedure for a short period of time (this is a normal response), if this persists for longer than 1 day please contact our office or go to the emergency room.  You may get side effects from the steroid.  This is not uncommon.  Symptoms include: elevated blood sugar, elevated blood pressure, headache, flushing, nausea, insomnia.  These symptoms are transient and will resolve within 1-3 days.  If symptoms last longer than this please contact our office or head to the emergency room.  Steroid medications can take anywhere from 3-14 days to take effect (rarely longer).  You may notice that your pain worsens for a short period of time after the injection, this would not be unusual due to the pressure and trauma from the needle.    If you do not have a follow up appointment scheduled, please contact my office (or the office of the physician who referred you for the procedure) to get a post-procedure follow up scheduled 2-4 weeks after the procedure.  This can be done as a virtual visit if that is more convenient for you.      What you need to do:    Keep a record of your response to the injection you had today.    How much relief did you get?   When did the relief start and how long did it last?  Were you able to decrease the use of any of your pain  medications?  Were you able to increase your level of activity?  How long did the relief last?    What to watch out for:    If you experience any of the following symptoms after your procedure, please notify the messaging service immediately (see above for contact information):   fever (increased oral temperature)   bleeding or swelling at the injection site,    drainage, rash or redness at the injection site    possible signs of infection    increased pain at the injection site   worsening of your usual pain   severe headache   new or worsening numbness    new arm and/or leg weakness, or    changes in bowel and/or bladder function: urinating or defecating on yourself and not knowing that you did it.    PLEASE FOLLOW ALL INSTRUCTIONS CAREFULLY     Do not engage in strenuous activity (e.g., lifting or pushing heavy objects or repeated bending) for 24 hours.     Do not take a bath, swim or use Jacuzzi for 24 hours after procedure. (A shower is fine).   Remove any Band-Aids when you get home.    Use cold/ice, as needed for comfort.  We recommend the use of cold therapy alternating on for 20 minutes, off for 20 minutes.    Do not apply direct heat (heating pad or heat packs) to the injection site for 24 hours.     Resume your usual medications, unless instructed otherwise by your Pain Physician.     If you are on warfarin (Coumadin) or other blood thinner, resume this medication as instructed by your prescribing Physician.    IF AT ANY POINT YOU ARE VERY CONCERNED ABOUT YOUR SYMPTOMS, PLEASE GO TO THE EMERGENCY ROOM.    If you develop worsening pain, weakness, numbness, lose bowel or bladder control (i.e., having an accident where you did not even know you had to go to the bathroom and suddenly noticed you soiled yourself), saddle anesthesia (a loss of sensation restricted to the area of the buttocks, anus and between the legs -- i.e., those parts of your body that would touch a saddle if you were sitting on one) you  need to go immediately to the emergency department for evaluation and treatment.    ----------------------------------------------------------------------------------------------------------------------------------------------------------------  If you received Sedation please read the following instructions:  POST SEDATION INSTRUCTIONS    Today you received intravenous medication (also known as sedation) that was used to help you relax and/or decrease discomfort during your procedure. This medication will be acting in your body for the next 24 hours, so you might feel a little tired or sleepy. This feeling will slowly wear off.   Common side effects associated with these medications include: drowsiness, dizziness, sleepiness, confusion, feeling excited, difficulty remembering things, lack of steadiness with walking or balance, loss of fine muscle control, slowed reflexes, difficulty focusing, and blurred vision.  Some over-the-counter and prescription medications (e.g., muscle relaxants, opioids, mood-altering medications, sedatives/hypnotics, antihistamines) can interact with the intravenous medication you received and cause an increased risk of the side effects listed above in addition to other potentially life threatening side effects. Use extreme caution if you are taking such medications, and consult with your Pain Physician or prescribing physician if you have any questions.  For the next 12-24 hours:    DO NOT--Drive a car, operate machinery or power tools   DO NOT--Drink any alcoholic beverages (not even beer), they may dangerously increase the risk of side effects.    DO NOT--Make any important legal or business decisions or sign important documents.  We advise you to have someone to assist you at home. Move slowly and carefully. Do not make sudden changes in position. Be aware of dizziness or light-headedness and move accordingly.   If you seek medical treatment within 24 hours, let the nurse or doctor  caring for you know that you have received the above medications. If you have any questions or concerns related to your sedation or treatment today please contact us.

## 2024-07-16 NOTE — OP NOTE
"PROCEDURE:  LUMBAR L5-S1 INTERLAMINAR EPIDURAL STEROID INJECTION    Patient Name: Lopez Hicks  MRN: [id number]  DATE OF PROCEDURE: 07/16/2024    INJECTION # 2    DIAGNOSIS: Lumbar Radiculopathy  CPT CODE: 89229      POSTPROCEDURE DIAGNOSIS: Same    PHYSICIAN: Robert Arora DO  NEEDLE TYPE: - 20G 3.5" Touhy Needle  MEDICATIONS INJECTED: 8cc mixture of 7cc Normal Saline + 10mg Dexamethasone (10mg/ml)  CONTRAST: Omni 300  LOSS OF RESISTANCE DEPTH: 7.5 cm    Sedation Medications - Oral Sedation     Estimated Blood Loss - <2ml  Drains: None  Specimens Removed: None  Urine Output - Not Measured  Complications: None  Outcome: Good    Informed Consent:  The patient's condition and proposed procedures, risks, and alternatives were discussed with the patient or responsible party.  The patient's / responsible party's questions were answered.   The patient / responsible party appeared to understand and chose to proceed.  Informed consent was obtained.  After obtaining written consent, an IV hep lock was placed. (See nurses notes for details).     Procedure in Detail:  The patient was taken back to the OR suite and placed in a prone position. The skin overlying the injection site was prepped and draped in an aseptic fashion. The target injection site (see above) was identified with fluoroscopy and marked.     Procedural Pause:  A procedural pause verifying correct patient, medical record number, allergies, medications to be administered, current vital signs, and surgical site was performed immediately prior to beginning the procedure.    The skin and subcutaneous tissue overlying the target site of injection for the L5-S1 epidural steroid injection was/were anesthetized using 4 mL of 2% lidocaine with a 25-gauge, 1½-inch needle.  The above noted Tuohy needle was advanced under fluoroscopic guidance towards the epidural space. Lateral fluoroscopic imaging was used to confirm depth. The epidural space was identified " using a loss of resistance to saline technique. (See above for loss of resistance depth). A microbore extension tubing was attached to the needle to minimize any movement of the needle during injection or syringe change.  After negative aspiration for heme or CSF, 1ml of contrast was injected to confirm placement and no intrathecal or vascular spread.  After repeat negative aspiration for heme or CSF, the above noted steroid solution was slowly injected in increments. The needle was then retracted approximately long term and the needle track was flushed with 0.5 ml of Lidocaine 2% to clear the needle prior to removal. The Tuohy needle was then removed.     The heart rate, pulse oximetry, and blood pressure were continuously monitored throughout the procedure.  The prpocedure was well tolerated. He was carefully escorted to the recovery room in stable condition. Patient was monitored by RN for recovery period.  The patient will be contacted in the next few days to determine extent of relief.  Patient was given post procedure and discharge instructions to follow at home.  The patient was discharged in a stable condition.    Note Electronically Signed By:  Robert Juarez

## 2024-07-16 NOTE — DISCHARGE SUMMARY
[facility]  Discharge Note  Short Stay    Procedure(s) (LRB):  JOHANA L5-S1 (aim right) (N/A)      OUTCOME: Patient tolerated treatment/procedure well without complication and is now ready for discharge.    DISPOSITION: Home or Self Care    FINAL DIAGNOSIS:  <principal problem not specified>    FOLLOWUP: In clinic    DISCHARGE INSTRUCTIONS:  No discharge procedures on file.     TIME SPENT ON DISCHARGE: 10 minutes

## 2024-07-16 NOTE — PLAN OF CARE
Discharge instructions given to patient,  verbalized understanding of all.  VSS, denies n/v and tolerating PO, rates pain level tolerable. Family notified for Patient discharge home.

## 2024-07-17 ENCOUNTER — TELEPHONE (OUTPATIENT)
Dept: ORTHOPEDICS | Facility: CLINIC | Age: 54
End: 2024-07-17
Payer: COMMERCIAL

## 2024-07-17 ENCOUNTER — PATIENT MESSAGE (OUTPATIENT)
Dept: ORTHOPEDICS | Facility: CLINIC | Age: 54
End: 2024-07-17
Payer: COMMERCIAL

## 2024-07-17 NOTE — TELEPHONE ENCOUNTER
Spoke to pt and informed pt that he need to send the paperwork to the disability office and go ahead and give them a call. Also gave pt the contact information to the disability department that was provided in the email you sent below.    Sincerely,  Donna Thompson, [facility]   Certified Clinical Medical Assistant to Dr. Jt christinal  Phone: [phone]  Fax: [phone]       ----- Message from Eran Lopez sent at 7/17/2024  9:26 AM CDT -----  Good morning,     Please have him call the disability office and send the paperwork there:         [facility]    Phone: [phone]  Fax: [phone]  Email: [email or web address]  Location: [address] [place] [address] (located on the 1st floor right next to the Blood Bank)        Sincerely,   Pito Lopez MS, [employer]  OR & Clinical Assistant to Dr. Jt Carranza III  Phone: [phone]  Fax: [phone]  ----- Message -----  From: Lory Razo  Sent: 7/17/2024   9:11 AM CDT  To: Dillon VILLAFUERTE Staff    Nature of Call:  clarity questions on FMLA paperwork     Best Call Back Number: [phone]    Additional Information:   Lopez Hicks calling regarding Patient Advice for wanting to speak to Pito with questions about the FMLA paperwork that he was told to email to him and wanted to confirm it was received , call back to inform

## 2024-07-17 NOTE — PROGRESS NOTES
Lopez Hicks presents for 3 month post-operative visit following a right total knee arthroplasty performed by Dr. Carranza on 4/22/2024.      Exam:     Ambulating well without assistive device.  Incision is healed   ROM:0-130    Post-operative radiographs reviewed today revealing a well fixed and aligned prosthesis.    A/P:  12 weeks s/p right total knee replacement    - Reviewed antibiotic prophylaxis   - Follow up in 9 months with surgeon for 1 year follow up. Pt will call clinic with problems/concerns.

## 2024-07-24 ENCOUNTER — OFFICE VISIT (OUTPATIENT)
Dept: URGENT CARE | Facility: CLINIC | Age: 54
End: 2024-07-24
Payer: COMMERCIAL

## 2024-07-24 VITALS
DIASTOLIC BLOOD PRESSURE: 72 MMHG | BODY MASS INDEX: 31.88 KG/M2 | WEIGHT: 261.81 LBS | TEMPERATURE: 99 F | OXYGEN SATURATION: 98 % | HEIGHT: 76 IN | HEART RATE: 79 BPM | RESPIRATION RATE: 18 BRPM | SYSTOLIC BLOOD PRESSURE: 115 MMHG

## 2024-07-24 DIAGNOSIS — B96.89 BACTERIAL URI: Primary | ICD-10-CM

## 2024-07-24 DIAGNOSIS — J06.9 BACTERIAL URI: Primary | ICD-10-CM

## 2024-07-24 DIAGNOSIS — E11.42 TYPE 2 DIABETES MELLITUS WITH DIABETIC POLYNEUROPATHY, WITH LONG-TERM CURRENT USE OF INSULIN: ICD-10-CM

## 2024-07-24 DIAGNOSIS — E78.5 HYPERLIPIDEMIA ASSOCIATED WITH TYPE 2 DIABETES MELLITUS: ICD-10-CM

## 2024-07-24 DIAGNOSIS — I10 HYPERTENSION, UNSPECIFIED TYPE: ICD-10-CM

## 2024-07-24 DIAGNOSIS — Z79.4 TYPE 2 DIABETES MELLITUS WITH DIABETIC POLYNEUROPATHY, WITH LONG-TERM CURRENT USE OF INSULIN: ICD-10-CM

## 2024-07-24 DIAGNOSIS — E11.69 HYPERLIPIDEMIA ASSOCIATED WITH TYPE 2 DIABETES MELLITUS: ICD-10-CM

## 2024-07-24 PROCEDURE — 99214 OFFICE O/P EST MOD 30 MIN: CPT | Mod: S$GLB,,, | Performed by: PHYSICIAN ASSISTANT

## 2024-07-24 RX ORDER — AMOXICILLIN AND CLAVULANATE POTASSIUM 875; 125 MG/1; MG/1
1 TABLET, FILM COATED ORAL EVERY 12 HOURS
Qty: 14 TABLET | Refills: 0 | Status: SHIPPED | OUTPATIENT
Start: 2024-07-24 | End: 2024-07-31

## 2024-07-24 NOTE — PATIENT INSTRUCTIONS

## 2024-07-24 NOTE — PROGRESS NOTES
"Subjective:      Patient ID: Lopez Hicks is a 54 y.o. male.    Vitals:  height is 6' 4" (1.93 m) and weight is 118.8 kg (261 lb 12.7 oz). His oral temperature is 98.6 °F (37 °C). His blood pressure is 115/72 and his pulse is 79. His respiration is 18 and oxygen saturation is 98%.     Chief Complaint: Sinus Problem    Yesterday pt symptoms worsened.     Patient provider note starts here:    Patient presents with complaints of sinus pressure and pain with associated scratchy throat, postnasal drip and cough for the past 7 8 days time.  Reports that his symptoms seem to have worsened yesterday as far as the nasal congestion goes.  Denies any fevers.  Does endorse some aches and pains which started yesterday as well.  He has been taking Claritin, throat lozenges with no significant relief.    Sinus Problem  This is a new problem. Episode onset: July 19th. The problem has been gradually worsening since onset. There has been no fever. His pain is at a severity of 6/10. The pain is mild. Associated symptoms include congestion, coughing, headaches, sinus pressure and a sore throat. Pertinent negatives include no chills, diaphoresis, ear pain, hoarse voice, neck pain, shortness of breath, sneezing or swollen glands. (Post nasal Drip) Treatments tried: allergy medication, cough drops. The treatment provided mild relief.       Constitution: Negative for chills, sweating and fever.   HENT:  Positive for congestion, postnasal drip, sinus pain, sinus pressure and sore throat. Negative for ear pain.    Neck: Negative for neck pain and neck stiffness.   Cardiovascular:  Negative for chest pain.   Respiratory:  Positive for cough and sputum production. Negative for chest tightness, shortness of breath and wheezing.    Gastrointestinal:  Negative for abdominal pain, vomiting and diarrhea.   Musculoskeletal:  Positive for muscle ache. Negative for pain.   Skin:  Negative for rash and wound.   Allergic/Immunologic: Negative " for itching and sneezing.   Neurological:  Positive for headaches. Negative for numbness and tingling.      Objective:     Physical Exam   Constitutional: He is oriented to person, place, and time. He appears well-developed. He is cooperative.  Non-toxic appearance. He does not appear ill. No distress.   HENT:   Head: Normocephalic and atraumatic.   Ears:   Right Ear: Hearing, tympanic membrane, external ear and ear canal normal.   Left Ear: Hearing, tympanic membrane, external ear and ear canal normal.   Nose: Congestion present. No mucosal edema, rhinorrhea or nasal deformity. No epistaxis. Right sinus exhibits no maxillary sinus tenderness and no frontal sinus tenderness. Left sinus exhibits no maxillary sinus tenderness and no frontal sinus tenderness.   Mouth/Throat: Uvula is midline and mucous membranes are normal. No trismus in the jaw. Normal dentition. No uvula swelling. Posterior oropharyngeal erythema present. No oropharyngeal exudate or posterior oropharyngeal edema.      Comments:   Purulent drainage noted to the posterior oropharynx  Eyes: Conjunctivae and lids are normal. No scleral icterus.   Neck: Trachea normal and phonation normal. Neck supple. No edema present. No erythema present. No neck rigidity present.   Cardiovascular: Normal rate, regular rhythm, normal heart sounds and normal pulses.   Pulmonary/Chest: Effort normal and breath sounds normal. No respiratory distress. He has no decreased breath sounds. He has no wheezes. He has no rhonchi.   Abdominal: Normal appearance.   Musculoskeletal: Normal range of motion.         General: No deformity. Normal range of motion.   Lymphadenopathy:     He has cervical adenopathy.   Neurological: He is alert and oriented to person, place, and time. He exhibits normal muscle tone. Coordination normal.   Skin: Skin is warm, dry, intact, not diaphoretic and not pale.   Psychiatric: His speech is normal and behavior is normal. Judgment and thought content  normal.   Nursing note and vitals reviewed.      Assessment:     1. Bacterial URI    2. Type 2 diabetes mellitus with diabetic polyneuropathy, with long-term current use of insulin    3. Hypertension, unspecified type    4. Hyperlipidemia associated with type 2 diabetes mellitus        Plan:       Bacterial URI  -     amoxicillin-clavulanate 875-125mg (AUGMENTIN) 875-125 mg per tablet; Take 1 tablet by mouth every 12 (twelve) hours. for 7 days  Dispense: 14 tablet; Refill: 0    Type 2 diabetes mellitus with diabetic polyneuropathy, with long-term current use of insulin    Hypertension, unspecified type    Hyperlipidemia associated with type 2 diabetes mellitus          Medical Decision Making:   History:   Old Medical Records: I decided to obtain old medical records.  Urgent Care Management:  A. Problem List:   -Acute: Bacterial URI   -Chronic: HTN, HLD, DMII   B. Differential diagnosis: viral vs bacterial URI, pharyngitis, otitis, COVID 19, influenza, pneumonia  C. Diagnostic Testing Ordered: None  D. Diagnostic Testing Considered: None  E. Independent Historians: None  F. Urgent Care Midlevel Independent Results Interpretation:   G. Radiology:  H. Review of Previous Medical Records:  I. Home Medications Reviewed  J. Social Determinants of Health considered  K. Medical Decision Making and Disposition:  Patient presents to the clinic with complaints of URI like symptoms for over a week's time.  On exam, he is afebrile and nontoxic in appearance.  Lungs are clear to auscultation bilaterally and vital signs are stable.  Advised close follow-up with primary care and strict ED precautions.  He verbalized understanding and agreed with this plan.         Patient Instructions   Thank you for choosing [facility]!     Our goal in the Urgent Care is to always provide outstanding medical care. You may receive a survey by mail or e-mail in the next week regarding your experience today. We would greatly appreciate you  completing and returning the survey. Your feedback provides us with a way to recognize our staff who provide very good care, and it helps us learn how to improve when your experience was below our aspiration of excellence.       We appreciate you trusting us with your medical care. We hope you feel better soon. We will be happy to take care of you for all of your future medical needs.    You must understand that you've received an Urgent Care treatment only and that you may be released before all your medical problems are known or treated. You, the patient, will arrange for follow up care as instructed.      Follow up with your PCP or specialty clinic as instructed in the next 2-3 days if not improved or as needed. You can call [phone] to schedule an appointment with appropriate provider.      If you condition worsens, we recommend that you receive another evaluation at the emergency room immediately or contact your primary medical clinic's after hours call service to discuss your concerns.      Please return here or go to the Emergency Department for any concerns or worsening condition.

## 2024-07-31 ENCOUNTER — PATIENT OUTREACH (OUTPATIENT)
Dept: ADMINISTRATIVE | Facility: HOSPITAL | Age: 54
End: 2024-07-31
Payer: COMMERCIAL

## 2024-07-31 VITALS — SYSTOLIC BLOOD PRESSURE: 115 MMHG | DIASTOLIC BLOOD PRESSURE: 72 MMHG

## 2024-08-18 ENCOUNTER — PATIENT MESSAGE (OUTPATIENT)
Dept: ADMINISTRATIVE | Facility: OTHER | Age: 54
End: 2024-08-18
Payer: COMMERCIAL

## 2024-08-29 ENCOUNTER — PATIENT MESSAGE (OUTPATIENT)
Dept: ENDOCRINOLOGY | Facility: CLINIC | Age: 54
End: 2024-08-29
Payer: COMMERCIAL

## 2024-08-29 RX ORDER — TIRZEPATIDE 5 MG/.5ML
5 INJECTION, SOLUTION SUBCUTANEOUS
Qty: 4 PEN | Refills: 3 | Status: SHIPPED | OUTPATIENT
Start: 2024-08-29

## 2024-08-29 RX ORDER — METFORMIN HYDROCHLORIDE 500 MG/1
1000 TABLET, EXTENDED RELEASE ORAL 2 TIMES DAILY WITH MEALS
Qty: 360 TABLET | Refills: 2 | Status: SHIPPED | OUTPATIENT
Start: 2024-08-29 | End: 2025-08-29

## 2024-10-11 ENCOUNTER — OFFICE VISIT (OUTPATIENT)
Dept: ENDOCRINOLOGY | Facility: CLINIC | Age: 54
End: 2024-10-11
Payer: COMMERCIAL

## 2024-10-11 DIAGNOSIS — Z79.4 TYPE 2 DIABETES MELLITUS WITH DIABETIC POLYNEUROPATHY, WITH LONG-TERM CURRENT USE OF INSULIN: Primary | ICD-10-CM

## 2024-10-11 DIAGNOSIS — E78.5 HYPERLIPIDEMIA, UNSPECIFIED HYPERLIPIDEMIA TYPE: ICD-10-CM

## 2024-10-11 DIAGNOSIS — E66.9 NON MORBID OBESITY, UNSPECIFIED OBESITY TYPE: ICD-10-CM

## 2024-10-11 DIAGNOSIS — E11.42 TYPE 2 DIABETES MELLITUS WITH DIABETIC POLYNEUROPATHY, WITH LONG-TERM CURRENT USE OF INSULIN: Primary | ICD-10-CM

## 2024-10-11 RX ORDER — INSULIN LISPRO 100 [IU]/ML
40 INJECTION, SOLUTION INTRAVENOUS; SUBCUTANEOUS
Qty: 120 ML | Refills: 2 | Status: SHIPPED | OUTPATIENT
Start: 2024-10-11

## 2024-10-11 RX ORDER — TIRZEPATIDE 2.5 MG/.5ML
2.5 INJECTION, SOLUTION SUBCUTANEOUS WEEKLY
Qty: 4 PEN | Refills: 3 | Status: SHIPPED | OUTPATIENT
Start: 2024-10-11

## 2024-10-11 RX ORDER — INSULIN DEGLUDEC 200 U/ML
30 INJECTION, SOLUTION SUBCUTANEOUS DAILY
Qty: 3 PEN | Refills: 5 | Status: SHIPPED | OUTPATIENT
Start: 2024-10-11

## 2024-10-11 NOTE — PROGRESS NOTES
CC: This 54 y.o. White male  is here for evaluation of  T2DM along with comorbidities indicated in the Visit Diagnosis section of this encounter.    HPI: Lopez Hicks was diagnosed with T2DM in early 20s, diagnosed upon routine labs.         Prior visit 4/2/24  Last seen several months ago. Has long h/o poor f/u. Pt cites difficulty with keeping his appts d/t heavy workload.   A1c is up from 6.7% in July to 7.1% in February.   90 day CGM average is 185.   Pt increased Mounjaro to 10 mg after lov. It's helped cut his appetite. He stopped taking Tresiba because he felt his BGs were controlled. However, review of CGM data earlier this year showed CGM average around 180.   Pt was  been given Mounjaro 7.5 mg the last 2 times from pharmacy. Has 2 pens left.   Pt increased Humalog dose from 20 to 40 units ac when Mounjaro was lowered.  Then he ran out of Humalog last week and now cannot get it refilled till April 25th.  Since he has no Humalog, he just resumed Tresiba.   He took Tresiba 40 units in the AM and later 20 units. Yesterday he took 40  units bid.   Plan   Start Synjardy (combination Jardiance and metformin) 1 tablet twice daily (equals same dose of metformin).   If unable to get Synjardy approved, contact office so that Jardiance prescription can be sent.   Finish out Mounjaro 7.5 mg then increase Mounjaro back to 10 mg once weekly.   Reduce Tresiba to 30 units once daily - starting tomorrow morning.   Continue Humalog at 20 units before each meal.   Prescription for Humalog sent to [facility]. If unable to get Humalog filled, then you can use Novolin R over the counter insulin from [employer], without a prescription.   If glucoses are consistently less than 80 overnight, then reduce Tresiba from 30 to 24 units once daily.   Continue pioglitazone.   Return to clinic in 3 months with labs prior. Virtual visit.      Prior visit 7/5/24 virtual   A1c is up from 7.1% in Feb to now 7.3%.   Mounjaro  was reduced to 5 mg last month per pt's request. He c/o burping when he resumed Mounjaro at 7.5 mg after holding it x 3 weeks d/t sx in April. He also took steroids x 1 week for sciatica. He also had an epidural steroid injection on 6/18.  Glucoses were high as well as appetite while he was off Mounjaro.   He has taken Mounjaro 5 mg for the last 2 weeks. Injecting Humalog 30 units BID   90 day CGM average 195  Plan Finish out Mounjaro 5 mg supply then increase to 7.5 mg weekly.   Continue Tresiba, Humalog, Synjardy and pioglitazone.   May need to decrease Humalog from 30 to 20 units when Mounjaro is increased to 7.5 mg.   Return to clinic in 3 months with labs prior, virtual visit.     Interval hx virtual visit   A1c down from 7.3% to 7.1%.   90 day CGM average 197.   Pt reduced Mounjaro from 7.5 mg to 5 mg in Sept but stopped after  2 doses d/t persistent s/e of burping and gas. Last Mounjaro dose was a month ago.     Pt has increased Humalog to 40 units ac. Glucoses and appetite are higher off Mounjaro.       LAST DIABETES EDUCATION:     HOSPITALIZED FOR DIABETES -  No.     DIABETES MEDICATIONS:   Tresiba  30 units every morning   Humalog 20 units ac   Metformin ER 1000 mg bid   Jardiance 25 mg once daily   Mounjaro 5 mg weekly on Mondays     Misses medication doses - Mounjaro as above       Injecting to abdomen.     DM COMPLICATIONS: peripheral neuropathy    SIGNIFICANT DIABETES MED HISTORY:   Trulicity 0.75 mg - nausea and bloating   Ozempic - acid reflux   Pioglitaonze 15 mg - edema to BLE   Synjardy - rash     SELF MONITORING BLOOD GLUCOSE: monitors with Dexcom G6 CGM. See Media for CGM report.     CGM interpretation: hyperglycemia secondary to diet and late Humalog doses. He is injecting it 1/2 to 1 hour after meals. No hypoglycemia, despite taking Humalog in the morning today without breakfast.                HYPOGLYCEMIC EPISODES: rare      CURRENT DIET: drinks unsweet tea and lemonade with AS. No soft  drinks. Eats 2-3 meals/day, sometimes skips breakfast.  Snacking less, smaller portions.     Diet recall yesterday: dinner was tacos, chips, 2 margaritas. Lunch was pasta. - he took humalog pc.     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 Daughter is vegan.       There were no vitals taken for this visit.      ROS:   CONSTITUTIONAL: Appetite good, denies fatigue           PHYSICAL EXAM:  GENERAL: Well developed, well nourished. No acute distress.   PSYCH: AAOx3, appropriate mood and affect, conversant, well-groomed. Judgement and insight good.            Hemoglobin A1C   Date Value Ref Range Status   10/04/2024 7.1 (H) 4.0 - 5.6 % Final     Comment:     ADA Screening Guidelines:  5.7-6.4%  Consistent with prediabetes  >or=6.5%  Consistent with diabetes    High levels of fetal hemoglobin interfere with the HbA1C  assay. Heterozygous hemoglobin variants (HbS, HgC, etc)do  not significantly interfere with this assay.   However, presence of multiple variants may affect accuracy.     06/28/2024 7.3 (H) 4.0 - 5.6 % Final     Comment:     ADA Screening Guidelines:  5.7-6.4%  Consistent with prediabetes  >or=6.5%  Consistent with diabetes    High levels of fetal hemoglobin interfere with the HbA1C  assay. Heterozygous hemoglobin variants (HbS, HgC, etc)do  not significantly interfere with this assay.   However, presence of multiple variants may affect accuracy.     02/21/2024 7.1 (H) 4.0 - 5.6 % Final     Comment:     ADA Screening Guidelines:  5.7-6.4%  Consistent with prediabetes  >or=6.5%  Consistent with diabetes    High levels of fetal hemoglobin interfere with the HbA1C  assay. Heterozygous hemoglobin variants (HbS, HgC, etc)do  not significantly interfere with this assay.   However, presence of multiple variants may affect accuracy.           Component Value Date/Time      03/22/2024 1130    K 3.9 03/22/2024 1130     03/22/2024 1130    CO2 28 03/22/2024 1130    BUN 16 03/22/2024 1130    CREATININE 1.2 03/22/2024 1130     (H) 03/22/2024 1130    CALCIUM 10.3 03/22/2024 1130    ALKPHOS 54 (L) 03/22/2024 1130    AST 20 03/22/2024 1130    ALT 25 03/22/2024 1130    BILITOT 0.2 03/22/2024 1130    EGFRNORACEVR >60.0 03/22/2024 1130    ESTGFRAFRICA >60.0 04/08/2022 0723           Lab Results   Component Value Date    CHOL 148 11/22/2023    CHOL 141 11/14/2022    CHOL 157 07/06/2022     Lab Results   Component Value Date    HDL 35 (L) 11/22/2023    HDL 34 (L) 11/14/2022    HDL 32 (L) 07/06/2022     Lab Results   Component Value Date    LDLCALC 75.0 11/22/2023    LDLCALC 42.6 (L) 11/14/2022    LDLCALC Invalid, Trig>400.0 07/06/2022     Lab Results   Component Value Date    TRIG 190 (H) 11/22/2023    TRIG 322 (H) 11/14/2022    TRIG 420 (H) 07/06/2022       Lab Results   Component Value Date    CHOLHDL 23.6 11/22/2023    CHOLHDL 24.1 11/14/2022    CHOLHDL 20.4 07/06/2022         Lab Results   Component Value Date    MICALBCREAT 29.4 11/22/2023         ASSESSMENT and PLAN:    A1C GOAL: < 7 %     1. Type 2 diabetes mellitus with diabetic polyneuropathy, with long-term current use of insulin  Continue current insulin doses but focus on taking Humalog 40 units before each meal, not after.   Try Mounjaro 2.5 mg weekly. Can try 5 mg if tolerating 2.5 mg ok for at least 2 months.   Continue Tresiba, pioglitazone, metformin and Jardiance.     Return to clinic in 3 months with labs prior.    tirzepatide (MOUNJARO) 2.5 mg/0.5 mL PnIj    insulin lispro (HUMALOG KWIKPEN INSULIN) 100 unit/mL pen    insulin degludec (TRESIBA FLEXTOUCH U-200) 200 unit/mL (3 mL) insulin pen    empagliflozin (JARDIANCE) 25 mg tablet    Hemoglobin A1C    Microalbumin/Creatinine Ratio, Urine      2. Non morbid obesity, unspecified obesity type  tirzepatide (MOUNJARO) 2.5 mg/0.5 mL PnIj      3. Hyperlipidemia,  unspecified hyperlipidemia type  Lipid Panel            Orders Placed This Encounter   Procedures    Hemoglobin A1C     Standing Status:   Future     Standing Expiration Date:   12/10/2025     Order Specific Question:   Send normal result to authorizing provider's In Basket if patient is active on MyChart:     Answer:   Yes    Lipid Panel     Standing Status:   Future     Standing Expiration Date:   10/11/2025     Order Specific Question:   Send normal result to authorizing provider's In Basket if patient is active on MyChart:     Answer:   Yes    Microalbumin/Creatinine Ratio, Urine     Standing Status:   Future     Standing Expiration Date:   12/10/2025     Order Specific Question:   Specimen Source     Answer:   Urine        Follow up in about 4 months (around 2/11/2025).       The patient location is: [place]   The chief complaint leading to consultation is: type 2 diabetes    Visit type: audiovisual    Face to Face time with patient: 22  27 minutes of total time spent on the encounter, which includes face to face time and non-face to face time preparing to see the patient (eg, review of tests), Obtaining and/or reviewing separately obtained history, Documenting clinical information in the electronic or other health record, Independently interpreting results (not separately reported) and communicating results to the patient/family/caregiver, or Care coordination (not separately reported).         Each patient to whom he or she provides medical services by telemedicine is:  (1) informed of the relationship between the physician and patient and the respective role of any other health care provider with respect to management of the patient; and (2) notified that he or she may decline to receive medical services by telemedicine and may withdraw from such care at any time.    Notes:

## 2024-10-11 NOTE — PATIENT INSTRUCTIONS
Continue current insulin doses but focus on taking Humalog 40 units before each meal, not after.   Try Mounjaro 2.5 mg weekly. Can try 5 mg if tolerating 2.5 mg ok for at least 2 months.   Continue Tresiba, pioglitazone, metformin and Jardiance.     Return to clinic in 3 months with labs prior.

## 2024-11-04 DIAGNOSIS — F41.9 ANXIETY: ICD-10-CM

## 2024-11-04 DIAGNOSIS — E78.1 HYPERTRIGLYCERIDEMIA: ICD-10-CM

## 2024-11-04 DIAGNOSIS — E11.69 HYPERLIPIDEMIA ASSOCIATED WITH TYPE 2 DIABETES MELLITUS: ICD-10-CM

## 2024-11-04 DIAGNOSIS — E78.5 HYPERLIPIDEMIA ASSOCIATED WITH TYPE 2 DIABETES MELLITUS: ICD-10-CM

## 2024-11-05 ENCOUNTER — PATIENT OUTREACH (OUTPATIENT)
Dept: ADMINISTRATIVE | Facility: HOSPITAL | Age: 54
End: 2024-11-05
Payer: COMMERCIAL

## 2024-11-05 RX ORDER — BUPROPION HYDROCHLORIDE 200 MG/1
200 TABLET, EXTENDED RELEASE ORAL 2 TIMES DAILY
Qty: 180 TABLET | Refills: 3 | Status: SHIPPED | OUTPATIENT
Start: 2024-11-05

## 2024-11-05 RX ORDER — OMEPRAZOLE 40 MG/1
40 CAPSULE, DELAYED RELEASE ORAL DAILY
Qty: 90 CAPSULE | Refills: 3 | Status: SHIPPED | OUTPATIENT
Start: 2024-11-05

## 2024-11-05 RX ORDER — FENOFIBRATE 145 MG/1
145 TABLET, FILM COATED ORAL DAILY
Qty: 90 TABLET | Refills: 3 | Status: SHIPPED | OUTPATIENT
Start: 2024-11-05

## 2024-11-05 RX ORDER — PRAVASTATIN SODIUM 40 MG/1
40 TABLET ORAL DAILY
Qty: 90 TABLET | Refills: 3 | Status: SHIPPED | OUTPATIENT
Start: 2024-11-05

## 2024-11-05 RX ORDER — ATENOLOL 100 MG/1
100 TABLET ORAL DAILY
Qty: 90 TABLET | Refills: 3 | Status: SHIPPED | OUTPATIENT
Start: 2024-11-05

## 2024-11-05 RX ORDER — HYDROCHLOROTHIAZIDE 25 MG/1
25 TABLET ORAL DAILY
Qty: 90 TABLET | Refills: 3 | Status: SHIPPED | OUTPATIENT
Start: 2024-11-05

## 2024-11-05 RX ORDER — BLOOD-GLUCOSE SENSOR
EACH MISCELLANEOUS
Qty: 9 EACH | Refills: 3 | Status: SHIPPED | OUTPATIENT
Start: 2024-11-05

## 2024-11-05 RX ORDER — AMLODIPINE BESYLATE 10 MG/1
10 TABLET ORAL DAILY
Qty: 90 TABLET | Refills: 3 | Status: SHIPPED | OUTPATIENT
Start: 2024-11-05

## 2024-11-05 RX ORDER — LOSARTAN POTASSIUM 100 MG/1
100 TABLET ORAL DAILY
Qty: 90 TABLET | Refills: 3 | Status: SHIPPED | OUTPATIENT
Start: 2024-11-05

## 2024-11-05 NOTE — TELEPHONE ENCOUNTER
Care Due:                  Date            Visit Type   Department     Provider  --------------------------------------------------------------------------------    Last Visit: None Found      None         None Found  Next Visit: None Scheduled  None         None Found                                                            Last  Test          Frequency    Reason                     Performed    Due Date  --------------------------------------------------------------------------------    Office Visit  15 months..  amLODIPine, buPROPion,     Not Found    Overdue                             fenofibrate, hydrALAZINE,                             hydroCHLOROthiazide,                             losartan, omeprazole,                             pravastatin..............    Lipid Panel.  12 months..  fenofibrate, pravastatin.  11- 11-    [facility] Embedded Care Due Messages. Reference number: [id number].   11/04/2024 8:43:37 PM CST

## 2024-11-05 NOTE — LETTER
AUTHORIZATION FOR RELEASE OF   CONFIDENTIAL INFORMATION        We are seeing Lopez Hicks, date of birth 1970, in the clinic at [facility] [place] [facility]. Sukumar Mackey DO is the patient's PCP. Lopez Hicks has an outstanding lab/procedure at the time we reviewed his chart. In order to help keep his health information updated, he has authorized us to request the following medical record(s):        (  )  MAMMOGRAM                                      (  )  COLONOSCOPY      (  )  PAP SMEAR                                          (  )  OUTSIDE LAB RESULTS     (  )  DEXA SCAN                                          (  x)  EYE EXAM            (  )  FOOT EXAM                                          (  )  ENTIRE RECORD     (  )  OUTSIDE IMMUNIZATIONS                 (  )  _______________         Please fax records to [facility], Helmstetter, Brian M., DO, [phone]     If you have any questions, please contact Cyndie at [phone].           Patient Name: Lopez Hicks  : 1970  Patient Phone #: [phone]

## 2024-11-05 NOTE — TELEPHONE ENCOUNTER
Refill Routing Note   Medication(s) are not appropriate for processing by [facility] for the following reason(s):        Patient not seen by provider within 15 months    [facility] action(s):  Defer   Requires appointment : Yes        Medication Therapy Plan: FLOS      Appointments  past 12m or future 3m with PCP    Date Provider   Last Visit   7/12/2021 Sukumar Mackey, DO   Next Visit   Visit date not found Sukumar Makcey, DO   ED visits in past 90 days: 0        Note composed:8:34 AM 11/05/2024

## 2024-11-06 ENCOUNTER — PATIENT OUTREACH (OUTPATIENT)
Dept: ADMINISTRATIVE | Facility: HOSPITAL | Age: 54
End: 2024-11-06
Payer: COMMERCIAL

## 2024-12-23 ENCOUNTER — PATIENT MESSAGE (OUTPATIENT)
Dept: INTERNAL MEDICINE | Facility: CLINIC | Age: 54
End: 2024-12-23
Payer: COMMERCIAL

## 2024-12-23 DIAGNOSIS — Z20.828 EXPOSURE TO INFLUENZA: Primary | ICD-10-CM

## 2024-12-23 RX ORDER — OSELTAMIVIR PHOSPHATE 75 MG/1
75 CAPSULE ORAL DAILY
Qty: 10 CAPSULE | Refills: 0 | Status: SHIPPED | OUTPATIENT
Start: 2024-12-23

## 2024-12-26 ENCOUNTER — PATIENT MESSAGE (OUTPATIENT)
Dept: INTERNAL MEDICINE | Facility: CLINIC | Age: 54
End: 2024-12-26
Payer: COMMERCIAL

## 2025-02-17 ENCOUNTER — RESULTS FOLLOW-UP (OUTPATIENT)
Dept: ENDOCRINOLOGY | Facility: CLINIC | Age: 55
End: 2025-02-17

## 2025-03-23 ENCOUNTER — PATIENT MESSAGE (OUTPATIENT)
Dept: ENDOCRINOLOGY | Facility: CLINIC | Age: 55
End: 2025-03-23
Payer: COMMERCIAL

## 2025-03-23 DIAGNOSIS — E11.42 TYPE 2 DIABETES MELLITUS WITH DIABETIC POLYNEUROPATHY, WITH LONG-TERM CURRENT USE OF INSULIN: ICD-10-CM

## 2025-03-23 DIAGNOSIS — Z79.4 TYPE 2 DIABETES MELLITUS WITH DIABETIC POLYNEUROPATHY, WITH LONG-TERM CURRENT USE OF INSULIN: ICD-10-CM

## 2025-03-24 RX ORDER — INSULIN LISPRO 100 [IU]/ML
40 INJECTION, SOLUTION INTRAVENOUS; SUBCUTANEOUS
Qty: 120 ML | Refills: 0 | Status: SHIPPED | OUTPATIENT
Start: 2025-03-24

## 2025-03-24 NOTE — TELEPHONE ENCOUNTER
Patient was called to schedule a follow up appointment with Mellisa Summers NP, no answer, voicemail left asking for a call back.

## 2025-04-22 ENCOUNTER — PATIENT MESSAGE (OUTPATIENT)
Dept: INTERNAL MEDICINE | Facility: CLINIC | Age: 55
End: 2025-04-22
Payer: COMMERCIAL

## 2025-04-22 ENCOUNTER — TELEPHONE (OUTPATIENT)
Dept: INTERNAL MEDICINE | Facility: CLINIC | Age: 55
End: 2025-04-22
Payer: COMMERCIAL

## 2025-04-22 DIAGNOSIS — Z00.00 ANNUAL PHYSICAL EXAM: Primary | ICD-10-CM

## 2025-04-22 RX ORDER — ATENOLOL 100 MG/1
100 TABLET ORAL DAILY
Qty: 90 TABLET | Refills: 0 | Status: SHIPPED | OUTPATIENT
Start: 2025-04-22

## 2025-04-22 NOTE — TELEPHONE ENCOUNTER
Dr. RODRÍGUEZ,    Do you want patient to have labs drawn prior to his appt? Last appt with you was 2023. He did have a few labs drawn in February from another provider.     Thank you,  Key

## 2025-04-22 NOTE — TELEPHONE ENCOUNTER
Care Due:                  Date            Visit Type   Department     Provider  --------------------------------------------------------------------------------    Last Visit: None Found      None         None Found                              EP -                              PRIMARY      [facility] INTERNAL  Next Visit: 04-      CARE ([facility])   MEDICINE       Sukumar Mackey                                                            Last  Test          Frequency    Reason                     Performed    Due Date  --------------------------------------------------------------------------------    CBC.........  12 months..  fenofibrate, hydrALAZINE.  03- 03-    CMP.........  12 months..  fenofibrate,               03- 03-                             hydroCHLOROthiazide,                             losartan, pravastatin....    [employer] Embedded Care Due Messages. Reference number: [id number].   4/22/2025 12:35:52 PM CDT

## 2025-04-28 ENCOUNTER — OFFICE VISIT (OUTPATIENT)
Dept: INTERNAL MEDICINE | Facility: CLINIC | Age: 55
End: 2025-04-28
Payer: COMMERCIAL

## 2025-04-28 VITALS
SYSTOLIC BLOOD PRESSURE: 126 MMHG | HEART RATE: 68 BPM | BODY MASS INDEX: 33.76 KG/M2 | RESPIRATION RATE: 22 BRPM | OXYGEN SATURATION: 97 % | HEIGHT: 76 IN | WEIGHT: 277.25 LBS | DIASTOLIC BLOOD PRESSURE: 66 MMHG | TEMPERATURE: 98 F

## 2025-04-28 DIAGNOSIS — Z12.11 COLON CANCER SCREENING: ICD-10-CM

## 2025-04-28 DIAGNOSIS — E11.69 HYPERLIPIDEMIA ASSOCIATED WITH TYPE 2 DIABETES MELLITUS: ICD-10-CM

## 2025-04-28 DIAGNOSIS — E78.5 HYPERLIPIDEMIA ASSOCIATED WITH TYPE 2 DIABETES MELLITUS: ICD-10-CM

## 2025-04-28 DIAGNOSIS — Z00.00 ANNUAL PHYSICAL EXAM: Primary | ICD-10-CM

## 2025-04-28 DIAGNOSIS — I10 HYPERTENSION, UNSPECIFIED TYPE: ICD-10-CM

## 2025-04-28 DIAGNOSIS — F41.9 ANXIETY: ICD-10-CM

## 2025-04-28 DIAGNOSIS — Z79.4 TYPE 2 DIABETES MELLITUS WITH DIABETIC POLYNEUROPATHY, WITH LONG-TERM CURRENT USE OF INSULIN: ICD-10-CM

## 2025-04-28 DIAGNOSIS — E11.42 TYPE 2 DIABETES MELLITUS WITH DIABETIC POLYNEUROPATHY, WITH LONG-TERM CURRENT USE OF INSULIN: ICD-10-CM

## 2025-04-28 DIAGNOSIS — K21.9 GASTROESOPHAGEAL REFLUX DISEASE WITHOUT ESOPHAGITIS: Chronic | ICD-10-CM

## 2025-04-28 DIAGNOSIS — N52.9 ERECTILE DYSFUNCTION, UNSPECIFIED ERECTILE DYSFUNCTION TYPE: ICD-10-CM

## 2025-04-28 PROCEDURE — 99396 PREV VISIT EST AGE 40-64: CPT | Mod: 25,S$GLB,, | Performed by: INTERNAL MEDICINE

## 2025-04-28 PROCEDURE — 90471 IMMUNIZATION ADMIN: CPT | Mod: S$GLB,,, | Performed by: INTERNAL MEDICINE

## 2025-04-28 PROCEDURE — 99999 PR PBB SHADOW E&M-EST. PATIENT-LVL V: CPT | Mod: PBBFAC,,, | Performed by: INTERNAL MEDICINE

## 2025-04-28 PROCEDURE — 90677 PCV20 VACCINE IM: CPT | Mod: S$GLB,,, | Performed by: INTERNAL MEDICINE

## 2025-04-28 RX ORDER — SILDENAFIL 100 MG/1
100 TABLET, FILM COATED ORAL DAILY PRN
Qty: 30 TABLET | Refills: 5 | Status: SHIPPED | OUTPATIENT
Start: 2025-04-28

## 2025-04-28 NOTE — PROGRESS NOTES
Subjective     Patient ID: Lopez Hicks is a 54 y.o. male.    Chief Complaint: Annual Exam    HPI  50 y.o. Male here for annual exam.      Vaccines: Influenza (2019); Tetanus (2018); Prevnar 13 (2020); Prevnar 20 (2025); Shingrix (will get)  Eye exam: 6/24  Colonoscopy: needs     Exercise: cuts grass  Diet: regular    Past Medical History:  No date: Anxiety  No date: Arthritis  No date: Diabetes mellitus, type 2  No date: GERD (gastroesophageal reflux disease)  No date: Hyperlipidemia  No date: Hypertension  2015: Mild nonproliferative diabetic retinopathy  Past Surgical History:  4/22/2024: ARTHROPLASTY, KNEE, TOTAL, USING COMPUTER-ASSISTED   NAVIGATION; Right      Comment:  Procedure: ARTHROPLASTY, KNEE, TOTAL, USING                COMPUTER-ASSISTED NAVIGATION: VELYS: RIGHT: DEPUY -                ATTUNE: SAME DAY;  Surgeon: Jt Carranza III, MD;                 Location: [facility];  Service: Orthopedics;  Laterality:                Right;  No date: CHOLECYSTECTOMY  7/16/2024: EPIDURAL STEROID INJECTION INTO LUMBAR SPINE; N/A      Comment:  Procedure: JOHANA L5-S1 (aim right);  Surgeon:                Robert Arora DO;  Location: [facility] PAIN                MANAGEMENT;  Service: Pain Management;  Laterality: N/A;                Oral sed-20mins-ASA 5d  6/18/2024: INJECTION, SPINE, LUMBOSACRAL, TRANSFORAMINAL APPROACH;   Right      Comment:  Procedure: RT L4-5 and L5-S1 TFESI;  Surgeon:                Robert Arora DO;  Location: [facility] PAIN                MANAGEMENT;  Service: Pain Management;  Laterality:                Right;  20 mins ASA 5 days  No date: LUNG LOBECTOMY; Right      Comment:  after severe pneumonia  No date: NASAL/SINUS ENDOSCOPY  No date: ROTATOR CUFF REPAIR; Bilateral      Comment:  Dr. JERRY Cardona, and Dr. Jung.  Social History    Socioeconomic History      Marital status:       Number of children: 3    Occupational History      Occupation:     Tobacco  Use      Smoking status: Never      Smokeless tobacco: Never    Substance and Sexual Activity      Alcohol use: Yes        Alcohol/week: 0.0 standard drinks of alcohol        Comment: beer once every 3 months      Drug use: No      Sexual activity: Yes        Partners: Female    Social History Narrative       and lives with wife .  Works at [employer].  Enjoys hunting and fishing.  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     Social Drivers of Health  Financial Resource Strain: Low Risk  (2025)      Overall Financial Resource Strain (CARDIA)          Difficulty of Paying Living Expenses: Not hard at all  Food Insecurity: No Food Insecurity (2025)      Hunger Vital Sign          Worried About Running Out of Food in the Last Year: Never true          Ran Out of Food in the Last Year: Never true  Transportation Needs: No Transportation Needs (2025)      PRAPARE - Transportation          Lack of Transportation (Medical): No          Lack of Transportation (Non-Medical): No  Physical Activity: Sufficiently Active (2025)      Exercise Vital Sign          Days of Exercise per Week: 7 days          Minutes of Exercise per Session: 60 min  Stress: No Stress Concern Present (2025)      [employer] [place] [employer] - Occupational Stress Questionnaire          Feeling of Stress : Not at all  Housing Stability: Low Risk  (2025)      Housing Stability Vital Sign          Unable to Pay for Housing in the Last Year: No          Number of Times Moved in the Last Year: 0          Homeless in the Last Year: No  Review of patient's allergies indicates:   -- Lisinopril     --  Muscle aches and joint stiffness   -- Ozempic [semaglutide]     --  Severe heartburn  Jose Hicks had no medications administered during this visit.  Review of Systems   Constitutional:  Negative for activity change, appetite change, chills, diaphoresis, fatigue, fever and unexpected weight  change.   HENT:  Negative for nasal congestion, mouth sores, postnasal drip, rhinorrhea, sinus pressure/congestion, sneezing, sore throat, trouble swallowing and voice change.    Eyes:  Negative for discharge, itching and visual disturbance.   Respiratory:  Negative for cough, chest tightness, shortness of breath and wheezing.    Cardiovascular:  Negative for chest pain, palpitations and leg swelling.   Gastrointestinal:  Negative for abdominal pain, blood in stool, constipation, diarrhea, nausea and vomiting.   Endocrine: Negative for cold intolerance and heat intolerance.   Genitourinary:  Negative for difficulty urinating, dysuria, flank pain, hematuria and urgency.   Musculoskeletal:  Negative for arthralgias, back pain, myalgias and neck pain.   Integumentary:  Negative for rash and wound.   Allergic/Immunologic: Negative for environmental allergies and food allergies.   Neurological:  Negative for dizziness, tremors, seizures, syncope, weakness and headaches.   Hematological:  Negative for adenopathy. Does not bruise/bleed easily.   Psychiatric/Behavioral:  Negative for confusion, self-injury, sleep disturbance and suicidal ideas. The patient is nervous/anxious.           Objective     Physical Exam  Constitutional:       General: He is not in acute distress.     Appearance: Normal appearance. He is well-developed. He is not ill-appearing, toxic-appearing or diaphoretic.   HENT:      Head: Normocephalic and atraumatic.      Right Ear: External ear normal.      Left Ear: External ear normal.      Nose: Nose normal.      Mouth/Throat:      Pharynx: No oropharyngeal exudate.   Eyes:      General: No scleral icterus.        Right eye: No discharge.         Left eye: No discharge.      Extraocular Movements: Extraocular movements intact.      Conjunctiva/sclera: Conjunctivae normal.      Pupils: Pupils are equal, round, and reactive to light.   Neck:      Thyroid: No thyromegaly.      Vascular: No JVD.    Cardiovascular:      Rate and Rhythm: Normal rate and regular rhythm.      Pulses: Normal pulses.      Heart sounds: Normal heart sounds. No murmur heard.  Pulmonary:      Effort: Pulmonary effort is normal. No respiratory distress.      Breath sounds: Normal breath sounds. No wheezing or rales.   Abdominal:      General: Bowel sounds are normal. There is no distension.      Palpations: Abdomen is soft.      Tenderness: There is no abdominal tenderness. There is no right CVA tenderness, left CVA tenderness, guarding or rebound.   Musculoskeletal:      Cervical back: Normal range of motion and neck supple. No rigidity.      Right lower leg: No edema.      Left lower leg: No edema.   Lymphadenopathy:      Cervical: No cervical adenopathy.   Skin:     General: Skin is warm and dry.      Capillary Refill: Capillary refill takes less than 2 seconds.      Coloration: Skin is not pale.      Findings: No rash.   Neurological:      General: No focal deficit present.      Mental Status: He is alert and oriented to person, place, and time. Mental status is at baseline.      Cranial Nerves: No cranial nerve deficit.      Sensory: No sensory deficit.      Motor: No weakness.      Coordination: Coordination normal.      Gait: Gait normal.      Deep Tendon Reflexes: Reflexes normal.   Psychiatric:         Mood and Affect: Mood normal.         Behavior: Behavior normal.         Thought Content: Thought content normal.         Judgment: Judgment normal.            Assessment and Plan     1. Annual physical exam  -     pneumoc 20-leandra conj-dip cr(PF) (PREVNAR-20 (PF)) injection Syrg 0.5 mL    2. Hyperlipidemia associated with type 2 diabetes mellitus    3. Anxiety    4. Hypertension, unspecified type    5. Type 2 diabetes mellitus with diabetic polyneuropathy, with long-term current use of insulin    6. Gastroesophageal reflux disease without esophagitis    7. Erectile dysfunction, unspecified erectile dysfunction type  -      sildenafiL (VIAGRA) 100 MG tablet; Take 1 tablet (100 mg total) by mouth daily as needed for Erectile Dysfunction.  Dispense: 30 tablet; Refill: 5    8. Colon cancer screening  -     Ambulatory referral/consult to Endo Procedure ; Future; Expected date: 04/29/2025         Blood work reviewed with pt     T2DM- stable on current meds       Followed by Endocrine      HTN- stable on Atenolol/Norvasc/HCTZ/Losartan/Hydralazine      HLD- stable on Pravastatin/Tricor      GERD- stable on Prilosec      Anxiety- stable on Wellbutrin     F/u in 1 yr

## 2025-04-29 ENCOUNTER — TELEPHONE (OUTPATIENT)
Dept: INTERNAL MEDICINE | Facility: CLINIC | Age: 55
End: 2025-04-29
Payer: COMMERCIAL

## 2025-04-29 DIAGNOSIS — Z00.00 ANNUAL PHYSICAL EXAM: Primary | ICD-10-CM

## 2025-05-14 DIAGNOSIS — Z79.4 TYPE 2 DIABETES MELLITUS WITH DIABETIC POLYNEUROPATHY, WITH LONG-TERM CURRENT USE OF INSULIN: ICD-10-CM

## 2025-05-14 DIAGNOSIS — E11.42 TYPE 2 DIABETES MELLITUS WITH DIABETIC POLYNEUROPATHY, WITH LONG-TERM CURRENT USE OF INSULIN: ICD-10-CM

## 2025-05-14 DIAGNOSIS — E66.9 NON MORBID OBESITY, UNSPECIFIED OBESITY TYPE: ICD-10-CM

## 2025-05-16 RX ORDER — TIRZEPATIDE 2.5 MG/.5ML
2.5 INJECTION, SOLUTION SUBCUTANEOUS WEEKLY
Qty: 4 PEN | Refills: 3 | OUTPATIENT
Start: 2025-05-16

## 2025-06-04 DIAGNOSIS — Z79.4 TYPE 2 DIABETES MELLITUS WITH DIABETIC POLYNEUROPATHY, WITH LONG-TERM CURRENT USE OF INSULIN: ICD-10-CM

## 2025-06-04 DIAGNOSIS — E11.42 TYPE 2 DIABETES MELLITUS WITH DIABETIC POLYNEUROPATHY, WITH LONG-TERM CURRENT USE OF INSULIN: ICD-10-CM

## 2025-06-04 DIAGNOSIS — E66.9 NON MORBID OBESITY, UNSPECIFIED OBESITY TYPE: ICD-10-CM

## 2025-06-04 RX ORDER — TIRZEPATIDE 2.5 MG/.5ML
2.5 INJECTION, SOLUTION SUBCUTANEOUS WEEKLY
Qty: 4 PEN | Refills: 3 | OUTPATIENT
Start: 2025-06-04

## 2025-06-06 ENCOUNTER — OFFICE VISIT (OUTPATIENT)
Dept: ENDOCRINOLOGY | Facility: CLINIC | Age: 55
End: 2025-06-06
Payer: COMMERCIAL

## 2025-06-06 VITALS
TEMPERATURE: 98 F | DIASTOLIC BLOOD PRESSURE: 75 MMHG | SYSTOLIC BLOOD PRESSURE: 159 MMHG | BODY MASS INDEX: 33.57 KG/M2 | WEIGHT: 275.81 LBS | HEART RATE: 80 BPM

## 2025-06-06 DIAGNOSIS — E78.5 HYPERLIPIDEMIA, UNSPECIFIED HYPERLIPIDEMIA TYPE: ICD-10-CM

## 2025-06-06 DIAGNOSIS — E66.9 NON MORBID OBESITY, UNSPECIFIED OBESITY TYPE: ICD-10-CM

## 2025-06-06 DIAGNOSIS — Z79.4 TYPE 2 DIABETES MELLITUS WITH DIABETIC POLYNEUROPATHY, WITH LONG-TERM CURRENT USE OF INSULIN: Primary | ICD-10-CM

## 2025-06-06 DIAGNOSIS — E11.42 TYPE 2 DIABETES MELLITUS WITH DIABETIC POLYNEUROPATHY, WITH LONG-TERM CURRENT USE OF INSULIN: Primary | ICD-10-CM

## 2025-06-06 PROCEDURE — 99999 PR PBB SHADOW E&M-EST. PATIENT-LVL V: CPT | Mod: PBBFAC,,, | Performed by: NURSE PRACTITIONER

## 2025-06-06 RX ORDER — INSULIN LISPRO 100 [IU]/ML
46 INJECTION, SOLUTION INTRAVENOUS; SUBCUTANEOUS
Qty: 135 ML | Refills: 2 | Status: SHIPPED | OUTPATIENT
Start: 2025-06-06

## 2025-06-06 RX ORDER — EMPAGLIFLOZIN, METFORMIN HYDROCHLORIDE 12.5; 1 MG/1; MG/1
TABLET, EXTENDED RELEASE ORAL
Qty: 180 TABLET | Refills: 2 | Status: SHIPPED | OUTPATIENT
Start: 2025-06-06

## 2025-06-06 RX ORDER — TIRZEPATIDE 2.5 MG/.5ML
2.5 INJECTION, SOLUTION SUBCUTANEOUS WEEKLY
Qty: 4 PEN | Refills: 3 | Status: SHIPPED | OUTPATIENT
Start: 2025-06-06

## 2025-06-06 RX ORDER — PEN NEEDLE, DIABETIC 30 GX3/16"
NEEDLE, DISPOSABLE MISCELLANEOUS
Qty: 400 EACH | Refills: 3 | Status: SHIPPED | OUTPATIENT
Start: 2025-06-06

## 2025-06-06 RX ORDER — INSULIN DEGLUDEC 200 U/ML
30 INJECTION, SOLUTION SUBCUTANEOUS DAILY
Qty: 6 PEN | Refills: 2 | Status: SHIPPED | OUTPATIENT
Start: 2025-06-06

## 2025-06-27 ENCOUNTER — OFFICE VISIT (OUTPATIENT)
Dept: URGENT CARE | Facility: CLINIC | Age: 55
End: 2025-06-27
Payer: COMMERCIAL

## 2025-06-27 VITALS
HEIGHT: 76 IN | SYSTOLIC BLOOD PRESSURE: 123 MMHG | OXYGEN SATURATION: 96 % | HEART RATE: 74 BPM | RESPIRATION RATE: 20 BRPM | BODY MASS INDEX: 33.49 KG/M2 | TEMPERATURE: 99 F | WEIGHT: 275 LBS | DIASTOLIC BLOOD PRESSURE: 71 MMHG

## 2025-06-27 DIAGNOSIS — M70.22 OLECRANON BURSITIS, LEFT ELBOW: Primary | ICD-10-CM

## 2025-06-27 RX ORDER — DEXAMETHASONE SODIUM PHOSPHATE 10 MG/ML
10 INJECTION INTRAMUSCULAR; INTRAVENOUS ONCE
Status: COMPLETED | OUTPATIENT
Start: 2025-06-27 | End: 2025-06-27

## 2025-06-27 RX ORDER — NAPROXEN 500 MG/1
500 TABLET ORAL 2 TIMES DAILY WITH MEALS
Qty: 20 TABLET | Refills: 1 | Status: SHIPPED | OUTPATIENT
Start: 2025-06-27

## 2025-06-27 RX ORDER — KETOROLAC TROMETHAMINE 30 MG/ML
30 INJECTION, SOLUTION INTRAMUSCULAR; INTRAVENOUS
Status: COMPLETED | OUTPATIENT
Start: 2025-06-27 | End: 2025-06-27

## 2025-06-27 RX ADMIN — DEXAMETHASONE SODIUM PHOSPHATE 10 MG: 10 INJECTION INTRAMUSCULAR; INTRAVENOUS at 06:06

## 2025-06-27 RX ADMIN — KETOROLAC TROMETHAMINE 30 MG: 30 INJECTION, SOLUTION INTRAMUSCULAR; INTRAVENOUS at 06:06

## 2025-06-27 NOTE — PROGRESS NOTES
"Subjective:      Patient ID: Lopez Hicks is a 55 y.o. male.    Vitals:  height is 6' 4" (1.93 m) and weight is 124.7 kg (275 lb). His oral temperature is 98.5 °F (36.9 °C). His blood pressure is 123/71 and his pulse is 74. His respiration is 20 and oxygen saturation is 96%.     Chief Complaint: Joint Swelling (Left elbow is swelling over the day time.  After patient goes to sleep and wakes up his arm is back down.   Then swells up again.  It is red and hot to the touch.)    68-year-old male complains of left elbow pain and swelling for the last 3-4 days.  Not getting worse.  No fever chills.    Edema  This is a new problem. The current episode started in the past 7 days (x 4 days). The problem has been gradually worsening. Associated symptoms include arthralgias and joint swelling. Pertinent negatives include no chills, congestion, coughing, diaphoresis, fatigue, fever, headaches, nausea, numbness, sore throat, vomiting or weakness. The symptoms are aggravated by bending (touching elbow aggravates it). He has tried acetaminophen, ice and position changes (tylenol last taken last night) for the symptoms. The treatment provided no relief.       Constitution: Negative for chills, sweating, fatigue and fever.   HENT:  Negative for congestion and sore throat.    Respiratory:  Negative for cough.    Gastrointestinal:  Negative for nausea and vomiting.   Musculoskeletal:  Positive for pain, joint pain and joint swelling.   Neurological:  Negative for headaches and numbness.      Objective:     Physical Exam   Constitutional: He is oriented to person, place, and time. He appears well-developed.   HENT:   Head: Normocephalic and atraumatic.   Ears:   Right Ear: External ear normal.   Left Ear: External ear normal.   Nose: No rhinorrhea or congestion.   Mouth/Throat: Oropharynx is clear and moist.   Eyes: Conjunctivae, EOM and lids are normal. Right eye exhibits no discharge. Left eye exhibits no discharge.   Neck: " Trachea normal and phonation normal. Neck supple.   Cardiovascular: Normal rate and regular rhythm.   Pulmonary/Chest: No stridor. No respiratory distress. He has no wheezes.   Musculoskeletal: Normal range of motion.         General: Swelling and tenderness present. Normal range of motion.      Comments: There is some mild tenderness and swelling over the left elbow/olecranon area.  There was no significant amount of fluid collection that can be drained.  Range of motion is fully intact.  Distal sensory motor vascular intact   Neurological: He is alert and oriented to person, place, and time.   Skin: Skin is warm, dry and intact.   Psychiatric: His speech is normal and behavior is normal. Judgment and thought content normal.   Nursing note and vitals reviewed.      Assessment:     1. Olecranon bursitis, left elbow        Plan:       Olecranon bursitis, left elbow  -     dexAMETHasone injection 10 mg  -     ketorolac injection 30 mg  -     BANDAGE ELASTIC 4IN ACE NS  -     naproxen (NAPROSYN) 500 MG tablet; Take 1 tablet (500 mg total) by mouth 2 (two) times daily with meals.  Dispense: 20 tablet; Refill: 1    Follow up if symptoms worsen or fail to improve, for F/U with PCP or ED. There are no Patient Instructions on file for this visit.

## 2025-07-01 ENCOUNTER — PATIENT OUTREACH (OUTPATIENT)
Dept: ADMINISTRATIVE | Facility: HOSPITAL | Age: 55
End: 2025-07-01
Payer: COMMERCIAL

## 2025-07-01 VITALS — SYSTOLIC BLOOD PRESSURE: 123 MMHG | DIASTOLIC BLOOD PRESSURE: 71 MMHG

## 2025-07-07 ENCOUNTER — PATIENT OUTREACH (OUTPATIENT)
Dept: ADMINISTRATIVE | Facility: HOSPITAL | Age: 55
End: 2025-07-07
Payer: COMMERCIAL

## 2025-07-07 NOTE — LETTER
AUTHORIZATION FOR RELEASE OF   CONFIDENTIAL INFORMATION    Dear Dr. Astorga,    We are seeing Lopez Hicks, date of birth 1970, in the clinic at [facility]. Sukumar Mackey DO is the patient's PCP. Lopez Hicks has an outstanding lab/procedure at the time we reviewed his chart. In order to help keep his health information updated, he has authorized us to request the following medical record(s):        (  )  MAMMOGRAM                                      (  )  COLONOSCOPY      (  )  PAP SMEAR                                          (  )  OUTSIDE LAB RESULTS     (  )  DEXA SCAN                                          ( x )  EYE EXAM            (  )  FOOT EXAM                                          (  )  ENTIRE RECORD     (  )  OUTSIDE IMMUNIZATIONS                 (  )  _______________         Please fax records to Sukumar Mackey DO,  at [phone] or email to [email or web address].      Patient Name: Lopez Hicks  : 1970  Patient Phone #: [phone]

## 2025-07-15 ENCOUNTER — PATIENT OUTREACH (OUTPATIENT)
Dept: ADMINISTRATIVE | Facility: HOSPITAL | Age: 55
End: 2025-07-15
Payer: COMMERCIAL

## 2025-07-15 NOTE — LETTER
AUTHORIZATION FOR RELEASE OF   CONFIDENTIAL INFORMATION        We are seeing Lopez Hicks, date of birth 1970, in the clinic at [facility]. Sukumar Mackey DO is the patient's PCP. Lopez Hicks has an outstanding lab/procedure at the time we reviewed his chart. In order to help keep his health information updated, he has authorized us to request the following medical record(s):        (  )  MAMMOGRAM                                      (  )  COLONOSCOPY      (  )  PAP SMEAR                                          (  )  OUTSIDE LAB RESULTS     (  )  DEXA SCAN                                          ( x )  EYE EXAM            (  )  FOOT EXAM                                          (  )  ENTIRE RECORD     (  )  OUTSIDE IMMUNIZATIONS                 (  )  _______________         Please fax records to [facility], Helmstetter, Brian M., DO,  at [phone] or email to [email or web address].       If you have any questions, please contact Cyndie at [phone].           Patient Name: Lopez Hicks  : 1970  Patient Phone #: [phone]

## 2025-08-05 RX ORDER — HYDRALAZINE HYDROCHLORIDE 25 MG/1
25 TABLET, FILM COATED ORAL 3 TIMES DAILY
Qty: 270 TABLET | Refills: 2 | Status: SHIPPED | OUTPATIENT
Start: 2025-08-05

## 2025-08-06 NOTE — TELEPHONE ENCOUNTER
No care due was identified.  [facility] Embedded Care Due Messages. Reference number: [id number].   8/05/2025 7:34:42 PM CDT

## 2025-08-06 NOTE — TELEPHONE ENCOUNTER
Refill Decision Note   Lopez Hicks  is requesting a refill authorization.  Brief Assessment and Rationale for Refill:  Approve     Medication Therapy Plan:         Comments:     Note composed:7:37 PM 08/05/2025

## 2025-08-07 ENCOUNTER — TELEPHONE (OUTPATIENT)
Dept: PODIATRY | Facility: CLINIC | Age: 55
End: 2025-08-07
Payer: COMMERCIAL

## 2025-08-07 NOTE — TELEPHONE ENCOUNTER
Copied from CRM #[id number]. Topic: Appointments - Appointment Access  >> Aug 7, 2025  1:41 PM Med Assistant Dori wrote:  Type:  Sooner Apoointment Request    Caller is requesting a sooner appointment.  Caller declined first available appointment listed below.  Caller will not accept being placed on the waitlist and is requesting a message be sent to doctor.  Name of Caller: Lopez   When is the first available appointment?N/A  Symptoms:Left big toe nail ingrown toenail pain when touched diabetic its been going on for the past three days   Would the patient rather a call back or a response via [facility]? Call  back   Best Call Back Number:[phone]   Additional Information: Calling to get an appt scheduled unable to schedule an appt with MD Bandar Madrid      I spoke with patient appointment scheduled for 8/26 at 2:45 Dr. Farrell

## 2025-08-14 ENCOUNTER — TELEPHONE (OUTPATIENT)
Dept: ENDOSCOPY | Facility: HOSPITAL | Age: 55
End: 2025-08-14
Payer: COMMERCIAL

## 2025-08-19 ENCOUNTER — TELEPHONE (OUTPATIENT)
Dept: ENDOSCOPY | Facility: HOSPITAL | Age: 55
End: 2025-08-19
Payer: COMMERCIAL

## 2025-08-21 ENCOUNTER — TELEPHONE (OUTPATIENT)
Dept: ENDOSCOPY | Facility: HOSPITAL | Age: 55
End: 2025-08-21
Payer: COMMERCIAL

## 2025-08-29 DIAGNOSIS — E11.42 TYPE 2 DIABETES MELLITUS WITH DIABETIC POLYNEUROPATHY, WITH LONG-TERM CURRENT USE OF INSULIN: ICD-10-CM

## 2025-08-29 DIAGNOSIS — Z79.4 TYPE 2 DIABETES MELLITUS WITH DIABETIC POLYNEUROPATHY, WITH LONG-TERM CURRENT USE OF INSULIN: ICD-10-CM

## 2025-08-29 DIAGNOSIS — E66.9 NON MORBID OBESITY, UNSPECIFIED OBESITY TYPE: ICD-10-CM

## 2025-08-29 RX ORDER — TIRZEPATIDE 2.5 MG/.5ML
2.5 INJECTION, SOLUTION SUBCUTANEOUS WEEKLY
Qty: 4 PEN | Refills: 3 | Status: CANCELLED | OUTPATIENT
Start: 2025-08-29

## 2025-08-29 RX ORDER — TIRZEPATIDE 7.5 MG/.5ML
7.5 INJECTION, SOLUTION SUBCUTANEOUS
Qty: 4 PEN | Refills: 3 | OUTPATIENT
Start: 2025-08-29

## (undated) DEVICE — SPONGE COTTON TRAY 4X4IN

## (undated) DEVICE — DRESSING AQUACEL AG RBBN 2X45

## (undated) DEVICE — SYS KNEE EPAK PIN ATTUNE
Type: IMPLANTABLE DEVICE | Site: KNEE | Status: NON-FUNCTIONAL
Removed: 2024-04-22

## (undated) DEVICE — Device

## (undated) DEVICE — CEMENT BONE CMW2 20G: Type: IMPLANTABLE DEVICE | Site: KNEE | Status: NON-FUNCTIONAL

## (undated) DEVICE — SPONGE GAUZE 16PLY 4X4

## (undated) DEVICE — UNDERGLOVES BIOGEL PI SIZE 8.5

## (undated) DEVICE — SUT VICRYL 3-0 27 CT-1

## (undated) DEVICE — MARKER SKIN RULER STERILE

## (undated) DEVICE — COVER CAMERA OPERATING ROOM

## (undated) DEVICE — ELECTRODE REM PLYHSV RETURN 9

## (undated) DEVICE — GLOVE BIOGEL PI MICRO SZ 7

## (undated) DEVICE — DRAPE TOP 53X102IN

## (undated) DEVICE — DRAPE THREE-QTR REINF 53X77IN

## (undated) DEVICE — BLADE DUAL CUT SAG 35X64X.89MM

## (undated) DEVICE — VELYS DEVICE DRAPE

## (undated) DEVICE — SUT VICRYL PLUS 2-0 CT1 18

## (undated) DEVICE — KIT IRR SUCTION HND PIECE

## (undated) DEVICE — BRUSH SCRUB HIBICLENS 4%

## (undated) DEVICE — SUT MCRYL PLUS 4-0 PS2 27IN

## (undated) DEVICE — TOWEL OR XRAY WHITE 17X26IN

## (undated) DEVICE — TUBE SUCTION FRAZIER VENT 10FR

## (undated) DEVICE — DRAPE SURG W/TWL 17 5/8X23

## (undated) DEVICE — SYR 50CC LL

## (undated) DEVICE — NDL HYPO STD REG BVL 18GX1.5IN

## (undated) DEVICE — SUT STRATAFIX PDS 1 CTX 18IN

## (undated) DEVICE — GOWN SMARTGOWN 3XL XLONG

## (undated) DEVICE — DRAPE INCISE IOBAN 2 23X33IN

## (undated) DEVICE — UNDERGLOVES BIOGEL PI SIZE 7.5

## (undated) DEVICE — SOL BETADINE 5%

## (undated) DEVICE — DRAPE T EXTRM SURG 121X128X90

## (undated) DEVICE — KIT TOTAL KNEE TKOFG OMC

## (undated) DEVICE — SUT 2/0 36IN COATED VICRYL

## (undated) DEVICE — BLADE SAGITTAL 18 X 1.27 X 90M

## (undated) DEVICE — SOL NACL IRR 1000ML BTL

## (undated) DEVICE — COVER LIGHT HANDLE 80/CA

## (undated) DEVICE — DRESSING TRANS 4X4 TEGADERM

## (undated) DEVICE — ADHESIVE DERMABOND ADVANCED

## (undated) DEVICE — SOL NACL IRR 3000ML

## (undated) DEVICE — SUT 1 36IN COATED VICRYL UN

## (undated) DEVICE — ALCOHOL 70% ANTISEPTIC ISO 4OZ

## (undated) DEVICE — IMPLANTABLE DEVICE
Type: IMPLANTABLE DEVICE | Site: KNEE | Status: NON-FUNCTIONAL
Removed: 2024-04-22

## (undated) DEVICE — HOOD T7 W/ PEEL AWAY LENS

## (undated) DEVICE — TAPE SILK 3IN

## (undated) DEVICE — DRESSING TELFA N ADH 3X8

## (undated) DEVICE — SYS REVOLUTION CEMENT MIXING

## (undated) DEVICE — GLOVE BIOGEL SKINSENSE PI 8.0